# Patient Record
Sex: FEMALE | Race: ASIAN | NOT HISPANIC OR LATINO | Employment: OTHER | URBAN - METROPOLITAN AREA
[De-identification: names, ages, dates, MRNs, and addresses within clinical notes are randomized per-mention and may not be internally consistent; named-entity substitution may affect disease eponyms.]

---

## 2020-10-19 ENCOUNTER — TRANSFERRED RECORDS (OUTPATIENT)
Dept: HEALTH INFORMATION MANAGEMENT | Facility: CLINIC | Age: 70
End: 2020-10-19

## 2020-10-21 ENCOUNTER — TRANSFERRED RECORDS (OUTPATIENT)
Dept: HEALTH INFORMATION MANAGEMENT | Facility: CLINIC | Age: 70
End: 2020-10-21

## 2020-10-21 LAB
CHOLESTEROL (EXTERNAL): 205 MG/DL (ref 100–199)
CREATININE (EXTERNAL): 0.86 MG/DL (ref 0.57–1.11)
GFR ESTIMATED (EXTERNAL): >60 ML/MIN/1.73M2
GFR ESTIMATED (IF AFRICAN AMERICAN) (EXTERNAL): >60 ML/MIN/1.73M2
GLUCOSE (EXTERNAL): 101 MG/DL (ref 65–100)
HDLC SERPL-MCNC: 62 MG/DL
LDL CHOLESTEROL DIRECT (EXTERNAL): 97 MG/DL
NON HDL CHOLESTEROL (EXTERNAL): 143 MG/DL
POTASSIUM (EXTERNAL): 3.8 MMOL/L (ref 3.5–5)
TRIGLYCERIDES (EXTERNAL): 410 MG/DL

## 2021-03-22 ENCOUNTER — TRANSFERRED RECORDS (OUTPATIENT)
Dept: HEALTH INFORMATION MANAGEMENT | Facility: CLINIC | Age: 71
End: 2021-03-22

## 2021-05-21 ENCOUNTER — TRANSFERRED RECORDS (OUTPATIENT)
Dept: HEALTH INFORMATION MANAGEMENT | Facility: CLINIC | Age: 71
End: 2021-05-21

## 2021-08-12 ENCOUNTER — LAB REQUISITION (OUTPATIENT)
Dept: LAB | Facility: CLINIC | Age: 71
End: 2021-08-12
Payer: COMMERCIAL

## 2021-08-12 DIAGNOSIS — U07.1 COVID-19: ICD-10-CM

## 2021-08-25 ENCOUNTER — OFFICE VISIT (OUTPATIENT)
Dept: FAMILY MEDICINE | Facility: CLINIC | Age: 71
End: 2021-08-25
Payer: COMMERCIAL

## 2021-08-25 ENCOUNTER — ANCILLARY PROCEDURE (OUTPATIENT)
Dept: GENERAL RADIOLOGY | Facility: CLINIC | Age: 71
End: 2021-08-25
Attending: INTERNAL MEDICINE
Payer: COMMERCIAL

## 2021-08-25 VITALS
OXYGEN SATURATION: 97 % | HEART RATE: 69 BPM | SYSTOLIC BLOOD PRESSURE: 128 MMHG | BODY MASS INDEX: 21.17 KG/M2 | TEMPERATURE: 97.1 F | DIASTOLIC BLOOD PRESSURE: 81 MMHG | RESPIRATION RATE: 16 BRPM | HEIGHT: 64 IN | WEIGHT: 124 LBS

## 2021-08-25 DIAGNOSIS — R01.1 HEART MURMUR: ICD-10-CM

## 2021-08-25 DIAGNOSIS — M25.531 RIGHT WRIST PAIN: ICD-10-CM

## 2021-08-25 DIAGNOSIS — I10 BENIGN ESSENTIAL HYPERTENSION: ICD-10-CM

## 2021-08-25 DIAGNOSIS — G30.9 ALZHEIMER'S DEMENTIA WITHOUT BEHAVIORAL DISTURBANCE, UNSPECIFIED TIMING OF DEMENTIA ONSET: ICD-10-CM

## 2021-08-25 DIAGNOSIS — F02.80 ALZHEIMER'S DEMENTIA WITHOUT BEHAVIORAL DISTURBANCE, UNSPECIFIED TIMING OF DEMENTIA ONSET: ICD-10-CM

## 2021-08-25 DIAGNOSIS — E78.5 HYPERLIPIDEMIA LDL GOAL <100: ICD-10-CM

## 2021-08-25 DIAGNOSIS — T88.7XXA MEDICATION SIDE EFFECTS: Primary | ICD-10-CM

## 2021-08-25 LAB
ERYTHROCYTE [DISTWIDTH] IN BLOOD BY AUTOMATED COUNT: 13.7 % (ref 10–15)
HCT VFR BLD AUTO: 43.7 % (ref 35–47)
HGB BLD-MCNC: 14.7 G/DL (ref 11.7–15.7)
MCH RBC QN AUTO: 32.7 PG (ref 26.5–33)
MCHC RBC AUTO-ENTMCNC: 33.6 G/DL (ref 31.5–36.5)
MCV RBC AUTO: 97 FL (ref 78–100)
PLATELET # BLD AUTO: 199 10E3/UL (ref 150–450)
RBC # BLD AUTO: 4.5 10E6/UL (ref 3.8–5.2)
WBC # BLD AUTO: 5.9 10E3/UL (ref 4–11)

## 2021-08-25 PROCEDURE — 36415 COLL VENOUS BLD VENIPUNCTURE: CPT | Performed by: INTERNAL MEDICINE

## 2021-08-25 PROCEDURE — 82043 UR ALBUMIN QUANTITATIVE: CPT | Performed by: INTERNAL MEDICINE

## 2021-08-25 PROCEDURE — 80053 COMPREHEN METABOLIC PANEL: CPT | Performed by: INTERNAL MEDICINE

## 2021-08-25 PROCEDURE — 93000 ELECTROCARDIOGRAM COMPLETE: CPT | Performed by: INTERNAL MEDICINE

## 2021-08-25 PROCEDURE — 99204 OFFICE O/P NEW MOD 45 MIN: CPT | Performed by: INTERNAL MEDICINE

## 2021-08-25 PROCEDURE — 85027 COMPLETE CBC AUTOMATED: CPT | Performed by: INTERNAL MEDICINE

## 2021-08-25 PROCEDURE — 73110 X-RAY EXAM OF WRIST: CPT | Mod: RT | Performed by: RADIOLOGY

## 2021-08-25 PROCEDURE — 80061 LIPID PANEL: CPT | Performed by: INTERNAL MEDICINE

## 2021-08-25 PROCEDURE — 84443 ASSAY THYROID STIM HORMONE: CPT | Performed by: INTERNAL MEDICINE

## 2021-08-25 PROCEDURE — 83721 ASSAY OF BLOOD LIPOPROTEIN: CPT | Mod: 59 | Performed by: INTERNAL MEDICINE

## 2021-08-25 RX ORDER — DONEPEZIL HYDROCHLORIDE 10 MG/1
10 TABLET, FILM COATED ORAL DAILY
COMMUNITY
Start: 2020-09-30 | End: 2022-01-24

## 2021-08-25 RX ORDER — AMLODIPINE BESYLATE 2.5 MG/1
2.5 TABLET ORAL DAILY
COMMUNITY
Start: 2021-08-04 | End: 2022-01-24

## 2021-08-25 RX ORDER — ATORVASTATIN CALCIUM 20 MG/1
20 TABLET, FILM COATED ORAL DAILY
COMMUNITY
Start: 2020-09-30 | End: 2021-10-19

## 2021-08-25 RX ORDER — FLUOXETINE 10 MG/1
1 CAPSULE ORAL DAILY
COMMUNITY
Start: 2021-05-04 | End: 2021-11-22

## 2021-08-25 RX ORDER — MEMANTINE HYDROCHLORIDE 10 MG/1
10 TABLET ORAL 2 TIMES DAILY
COMMUNITY
Start: 2020-09-30 | End: 2021-11-02

## 2021-08-25 ASSESSMENT — PATIENT HEALTH QUESTIONNAIRE - PHQ9: SUM OF ALL RESPONSES TO PHQ QUESTIONS 1-9: 0

## 2021-08-25 ASSESSMENT — MIFFLIN-ST. JEOR: SCORE: 1072.46

## 2021-08-25 NOTE — PROGRESS NOTES
Assessment & Plan   Problem List Items Addressed This Visit     None      Visit Diagnoses     Medication side effects    -  Primary    Relevant Orders    EKG 12-lead complete w/read - Clinics (Completed)    Right wrist pain        Relevant Orders    Orthopedic  Referral    XR Wrist Right G/E 3 Views (Completed)    Benign essential hypertension        Relevant Orders    Comprehensive metabolic panel (BMP + Alb, Alk Phos, ALT, AST, Total. Bili, TP) (Completed)    Albumin Random Urine Quantitative with Creat Ratio (Completed)    CBC with platelets (Completed)    EKG 12-lead complete w/read - Clinics (Completed)    Hyperlipidemia LDL goal <100        Relevant Medications    atorvastatin (LIPITOR) 20 MG tablet    Other Relevant Orders    Lipid panel reflex to direct LDL Fasting (Completed)    LDL cholesterol direct (Completed)    Alzheimer's dementia without behavioral disturbance, unspecified timing of dementia onset (H)        Relevant Medications    donepezil (ARICEPT) 10 MG tablet    FLUoxetine (PROZAC) 10 MG capsule    memantine (NAMENDA) 10 MG tablet    Other Relevant Orders    TSH with free T4 reflex (Completed)    Memory Clinic Referral    Heart murmur        Relevant Orders    Echocardiogram Complete        Discussed multiple medical problems.  We will do an echo due to murmur.  Referral to memory clinic.  No change of medications for now.  Denies any side effects from medications or dizziness.  No drowsiness or lethargy.  We will check an EKG make sure QT is not prolonged.All medications will need to be monitored intensely and closely for any side effects or toxicity including Aricept and namenda due to underlying cognitive issues..     She has right wrist pain advised to use a wrist splint ,will check x-ray of the right wrist and refer to sports medicine, she might have tenosynovitis and may benefit from steroid injection.  No history for trauma to the wrist.           CONSULTATION/REFERRAL to  "memory clinic  See Patient Instructions    Return in about 3 months (around 11/25/2021), or if symptoms worsen or fail to improve, for As needed and if symptoms worsen, Physical Exam.    Prema Ramachandran MD  [facility] CASSY Salazar is a 70 year old who presents for the following health issues  accompanied by her son Preston :    HPI     Moved to [place] , was doctoring at [place],    Right wrist pain, no trauma, no swelling    Right eye cataract, doing surgery for left eye    Will need to schedule preop visit.    Memory concerns over past 6 yrs, sees Preston Wiggins at [place] [facility] in [place],     Moved from there    Depressed mood in winter, on prozac helps,       Had bone densityometry last yr, was good as per son who is a nurse,     Last mammogram 5 yrs ago      Drink alcohol 1-2 x a week, 2 drinks at a time    No smoking history     family difficult to say about family history as per son from [facility], ?[place] , had dementia,               Review of Systems   Constitutional, HEENT, cardiovascular, pulmonary, gi and gu systems are negative, except as otherwise noted.      Objective    /81 (BP Location: Left arm, Patient Position: Chair, Cuff Size: Adult Regular)   Pulse 69   Temp 97.1  F (36.2  C) (Temporal)   Resp 16   Ht 1.626 m (5' 4\")   Wt 56.2 kg (124 lb)   SpO2 97%   BMI 21.28 kg/m    Body mass index is 21.28 kg/m .  Physical Exam   GENERAL: healthy, alert and no distress  EYES: Eyes grossly normal to inspection, PERRL and conjunctivae and sclerae normal  HENT: ear canals and TM's normal, nose and mouth without ulcers or lesions  NECK: no adenopathy, no asymmetry, masses, or scars and thyroid normal to palpation  RESP: lungs clear to auscultation - no rales, rhonchi or wheezes  CV: regular rate and rhythm, normal S1 S2, no S3 or S4, grade 2 systolic murmur left lower sternal border, no click or rub, no peripheral edema and peripheral pulses strong  ABDOMEN: " soft, nontender, no hepatosplenomegaly, no masses and bowel sounds normal  MS: no gross musculoskeletal defects noted, no edema.  Has point tenderness to the base of the right thumb.  Positive Finkelstein test.  No swelling of the right wrist.  Some limitation range of motion due to pain  SKIN: no suspicious lesions or rashes  NEURO: Normal strength and tone, mentation slow and speech normal  PSYCH: mentation appears slow , affect normal/bright    No results found for any previous visit.

## 2021-08-25 NOTE — Clinical Note
Please abstract the following data from this visit with this patient into the appropriate field in Epic:    Tests that can be patient reported without a hard copy:        Other Tests found in the patient's chart through Chart Review/Care Everywhere:    Colonoscopy done by this group [facility] on this date: 10/12/2011 and Mammogram done by this group [facility] this date: 10/19/2020

## 2021-08-25 NOTE — LETTER
August 27, 2021      Marie Connolly  [address]  [place] [facility] [address]        Dear MsJesúsMohsen,    We are writing to inform you of your test results.    Marie, reviewed your EKG looks normal.  CBC shows normal white count, normal red blood cell count, normal hemoglobin hematocrit; no anemia and normal platelet count    If you have any questions or concerns, please call the clinic at the number listed above.       Sincerely,      Prema Ramachandran MD

## 2021-08-25 NOTE — LETTER
August 27, 2021      Marie Connolly  [address]  [facility] [address]        Dear ,    We are writing to inform you of your test results.    Marie, please be reassured with the x-ray of the wrist there is no fracture, there is normal joint alignment.  There is mention of mild arthritis at the base of the right thumb.  Please follow-up recommendations given in the clinic  Dr. Ramachandran       Resulted Orders   XR Wrist Right G/E 3 Views    Narrative    Examination:  XR WRIST RIGHT G/E 3 VIEWS    Date:  8/25/2021 2:46 PM     Clinical Information: Right wrist pain    Comparison: none.      Impression    Impression:    1.  Right wrist negative for fracture or erosion. Normal joint  alignment. Maintained joint spacing. Mild degenerative arthritic  changes at the base of the thumb and the first MCP, first CMC, and STT  joints. Mild soft tissue swelling in the radial aspect of the wrist.    HAIM HERRERA MD         SYSTEM ID:  [id number]       If you have any questions or concerns, please call the clinic at the number listed above.       Sincerely,      Prema Ramachandran MD

## 2021-08-25 NOTE — LETTER
August 30, 2021      Marie Connolly  [address]  [facility] [address]        Dear ,    We are writing to inform you of your test results.    Joselitoheather, your labs,  Lipid panel shows LDL at 114, goal is less than 100, very elevated triglyceride at 671, goal less than 150, HDL which is a good cholesterol is at goal at 61, HDL is the good cholesterol,  LDL is the bad cholesterol.  I do recommend you start on medication for triglyceride called TriCor 154 mg once daily that helps lower the triglyceride to normal.  Please continue on atorvastatin current dose you are taking   Please abstain from any alcohol intake as that can increase your triglyceride; very elevated triglyceride can cause pancreatitis.  We will need to closely monitor for any signs or symptoms of abdominal pain or vomiting.   Chemistry shows normal electrolytes, normal calcium level, kidney function test appears to be normal GFR 66, glucose is normal, calcium level is normal.   GFR is glomerular filtration rate.  Normal GFR is greater than 60.   Urine microalbumin is negative which means there is no protein in the urine which is reassuring.   We will repeat your lipid panel in 2 months fasting.   Any further questions please let me know   Please call the lab in 2 months to reschedule your labs for lipid panel fasting   We will send a prescription of TriCor/fenofibrate to the pharmacy on record.   Any further questions please let me know   Dr. Ramachandran      Resulted Orders   Lipid panel reflex to direct LDL Fasting   Result Value Ref Range    Cholesterol 240 (H) <200 mg/dL      Comment:      Age 0-19 years  Desirable: <170 mg/dL  Borderline high:  170-199 mg/dl  High:            >199 mg/dl    Age 20 years and older  Desirable: <200 mg/dL    Triglycerides 671 (H) <150 mg/dL      Comment:      0-9 years:  Normal:    Less than 75 mg/dL  Borderline high:  75-99 mg/dL  High:             Greater than or equal to 100 mg/dL    0-19 years:  Normal:     Less than 90 mg/dL  Borderline high:   mg/dL  High:             Greater than or equal to 130 mg/dL    20 years and older:  Normal:    Less than 150 mg/dL  Borderline high:  150-199 mg/dL  High:             200-499 mg/dL  Very high:   Greater than or equal to 500 mg/dL    Direct Measure HDL 61 >=50 mg/dL      Comment:      0-19 years:       Greater than or equal to 45 mg/dL   Low: Less than 40 mg/dL   Borderline low: 40-44 mg/dL     20 years and older:   Female: Greater than or equal to 50 mg/dL   Male:   Greater than or equal to 40 mg/dL         LDL Cholesterol Calculated        Comment:      Cannot estimate LDL when triglyceride exceeds 400 mg/dL  Age 0-19 years:  Desirable: 0-110 mg/dL   Borderline high: 110-129 mg/dL   High: >= 130 mg/dL    Age 20 years and older:  Desirable: <100mg/dL  Above desirable: 100-129 mg/dL   Borderline high: 130-159 mg/dL   High: 160-189 mg/dL   Very high: >= 190 mg/dL    Non HDL Cholesterol 179 (H) <130 mg/dL      Comment:      0-19 years:  Desirable:          Less than 120 mg/dL  Borderline high:   120-144 mg/dL  High:                   Greater than or equal to 145 mg/dL    20 years and older:  Desirable:          130 mg/dL  Above Desirable: 130-159 mg/dL  Borderline high:   160-189 mg/dL  High:               190-219 mg/dL  Very high:     Greater than or equal to 220 mg/dL    Patient Fasting > 8hrs? No    Comprehensive metabolic panel (BMP + Alb, Alk Phos, ALT, AST, Total. Bili, TP)   Result Value Ref Range    Sodium 140 133 - 144 mmol/L    Potassium 4.2 3.4 - 5.3 mmol/L    Chloride 106 94 - 109 mmol/L    Carbon Dioxide (CO2) 29 20 - 32 mmol/L    Anion Gap 5 3 - 14 mmol/L    Urea Nitrogen 9 7 - 30 mg/dL    Creatinine 0.89 0.52 - 1.04 mg/dL    Calcium 9.2 8.5 - 10.1 mg/dL    Glucose 101 (H) 70 - 99 mg/dL    Alkaline Phosphatase 55 40 - 150 U/L    AST 34 0 - 45 U/L    ALT 40 0 - 50 U/L    Protein Total 7.3 6.8 - 8.8 g/dL    Albumin 3.8 3.4 - 5.0 g/dL    Bilirubin Total 0.5 0.2 -  1.3 mg/dL    GFR Estimate 66 >60 mL/min/1.73m2      Comment:      As of July 11, 2021, eGFR is calculated by the CKD-EPI creatinine equation, without race adjustment. eGFR can be influenced by muscle mass, exercise, and diet. The reported eGFR is an estimation only and is only applicable if the renal function is stable.   Albumin Random Urine Quantitative with Creat Ratio   Result Value Ref Range    Creatinine Urine mg/dL 190 mg/dL    Albumin Urine mg/L 13 mg/L    Albumin Urine mg/g Cr 6.84 0.00 - 25.00 mg/g Cr   CBC with platelets   Result Value Ref Range    WBC Count 5.9 4.0 - 11.0 10e3/uL    RBC Count 4.50 3.80 - 5.20 10e6/uL    Hemoglobin 14.7 11.7 - 15.7 g/dL    Hematocrit 43.7 35.0 - 47.0 %    MCV 97 78 - 100 fL    MCH 32.7 26.5 - 33.0 pg    MCHC 33.6 31.5 - 36.5 g/dL    RDW 13.7 10.0 - 15.0 %    Platelet Count 199 150 - 450 10e3/uL   TSH with free T4 reflex   Result Value Ref Range    TSH 1.08 0.40 - 4.00 mU/L   LDL cholesterol direct   Result Value Ref Range    LDL Cholesterol Direct 114 (H) <100 mg/dL      Comment:      Age 0-19 years:  Desirable: 0-110 mg/dL   Borderline high: 110-129 mg/dL   High: >= 130 mg/dL    Age 20 years and older:  Desirable: <100mg/dL  Above desirable: 100-129 mg/dL   Borderline high: 130-159 mg/dL   High: 160-189 mg/dL   Very high: >= 190 mg/dL       If you have any questions or concerns, please call the clinic at the number listed above.       Sincerely,      Prema Ramachandran MD

## 2021-08-27 ENCOUNTER — TELEPHONE (OUTPATIENT)
Dept: FAMILY MEDICINE | Facility: CLINIC | Age: 71
End: 2021-08-27

## 2021-08-27 LAB
ALBUMIN SERPL-MCNC: 3.8 G/DL (ref 3.4–5)
ALP SERPL-CCNC: 55 U/L (ref 40–150)
ALT SERPL W P-5'-P-CCNC: 40 U/L (ref 0–50)
ANION GAP SERPL CALCULATED.3IONS-SCNC: 5 MMOL/L (ref 3–14)
AST SERPL W P-5'-P-CCNC: 34 U/L (ref 0–45)
BILIRUB SERPL-MCNC: 0.5 MG/DL (ref 0.2–1.3)
BUN SERPL-MCNC: 9 MG/DL (ref 7–30)
CALCIUM SERPL-MCNC: 9.2 MG/DL (ref 8.5–10.1)
CHLORIDE BLD-SCNC: 106 MMOL/L (ref 94–109)
CHOLEST SERPL-MCNC: 240 MG/DL
CO2 SERPL-SCNC: 29 MMOL/L (ref 20–32)
CREAT SERPL-MCNC: 0.89 MG/DL (ref 0.52–1.04)
CREAT UR-MCNC: 190 MG/DL
FASTING STATUS PATIENT QL REPORTED: NO
GFR SERPL CREATININE-BSD FRML MDRD: 66 ML/MIN/1.73M2
GLUCOSE BLD-MCNC: 101 MG/DL (ref 70–99)
HDLC SERPL-MCNC: 61 MG/DL
LDLC SERPL CALC-MCNC: 114 MG/DL
LDLC SERPL CALC-MCNC: ABNORMAL MG/DL
MICROALBUMIN UR-MCNC: 13 MG/L
MICROALBUMIN/CREAT UR: 6.84 MG/G CR (ref 0–25)
NONHDLC SERPL-MCNC: 179 MG/DL
POTASSIUM BLD-SCNC: 4.2 MMOL/L (ref 3.4–5.3)
PROT SERPL-MCNC: 7.3 G/DL (ref 6.8–8.8)
SODIUM SERPL-SCNC: 140 MMOL/L (ref 133–144)
TRIGL SERPL-MCNC: 671 MG/DL
TSH SERPL DL<=0.005 MIU/L-ACNC: 1.08 MU/L (ref 0.4–4)

## 2021-08-27 NOTE — RESULT ENCOUNTER NOTE
Marie, please be reassured with the x-ray of the wrist there is no fracture, there is normal joint alignment.  There is mention of mild arthritis at the base of the right thumb.  Please follow-up recommendations given in the clinicDrJesús Ramachandran

## 2021-08-28 DIAGNOSIS — E78.2 MIXED HYPERLIPIDEMIA: Primary | ICD-10-CM

## 2021-08-28 RX ORDER — FENOFIBRATE 145 MG/1
145 TABLET, COATED ORAL DAILY
Qty: 90 TABLET | Refills: 1 | Status: SHIPPED | OUTPATIENT
Start: 2021-08-28 | End: 2022-01-24

## 2021-08-30 ENCOUNTER — TELEPHONE (OUTPATIENT)
Dept: FAMILY MEDICINE | Facility: CLINIC | Age: 71
End: 2021-08-30

## 2021-08-30 NOTE — TELEPHONE ENCOUNTER
Patient Contact    Attempt # 1    Was call answered?  No.  Unable to leave message.    Mailed result letter    Radha CHOWDARY RN

## 2021-08-30 NOTE — TELEPHONE ENCOUNTER
----- Message from Prema Ramachandran MD sent at 8/28/2021 11:49 AM CDT -----  Prina, reviewed rest of your labs,Lipid panel shows LDL at 114, goal is less than 100, very elevated triglyceride at 671, goal less than 150, HDL which is a good cholesterol is at goal at 61, HDL is the good cholesterol,LDL is the bad cholesterol.I do recommend you start on medication for triglyceride called TriCor 154 mg once daily that helps lower the triglyceride to normal.  Please continue on atorvastatin current dose you are taking  Please abstain from any alcohol intake as that can increase your triglyceride; very elevated triglyceride can cause pancreatitis.  We will need to closely monitor for any signs or symptoms of abdominal pain or vomiting.  Chemistry shows normal electrolytes, normal calcium level, kidney function test appears to be normal GFR 66, glucose is normal, calcium level is normal.  GFR is glomerular filtration rate.  Normal GFR is greater than 60.  Urine microalbumin is negative which means there is no protein in the urine which is reassuring.  We will repeat your lipid panel in 2 months fasting.  Any further questions please let me know  Please call the lab in 2 months to reschedule your labs for lipid panel fasting  We will send a prescription of TriCor/fenofibrate to the pharmacy on record.  Any further questions please let me know  Dr. Ramachandran

## 2021-08-30 NOTE — TELEPHONE ENCOUNTER
Unable to leave message, Radha EUBANKS mailed results.     Missy MartinezRN  [facility]

## 2021-09-02 NOTE — PROGRESS NOTES
CHIEF COMPLAINT:  No chief complaint on file.       HISTORY OF PRESENT ILLNESS  Ms. Connolly is a pleasant 70 year old year old female who presents to clinic today with right wrist pain.  Marie explains that she fell. She is here with her daughter, who is helping provide history. Daughter states she has Alzheimer's. Daughter states she has been holding and favoring her wrist, notes every time she saw her patient would state her wrist hurts.     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 No    Do you have any vision problems? No    Do you have any chest pain or edema? No    Do you have any shortness of breath or wheezing?  No    Do you have stomach problems? No    Do you have any numbness or focal weakness? Yes, in right wrist    Do you have diabetes? No    Do you have problems with bleeding or clotting? No    Do you have an rashes or other skin lesions? No    MEDICAL HISTORY  There is no problem list on file for this patient.      Current Outpatient Medications   Medication Sig Dispense Refill     amLODIPine (NORVASC) 2.5 MG tablet Take 2.5 mg by mouth daily       atorvastatin (LIPITOR) 20 MG tablet Take 20 mg by mouth daily       donepezil (ARICEPT) 10 MG tablet Take 10 mg by mouth daily       fenofibrate (TRICOR) 145 MG tablet Take 1 tablet (145 mg) by mouth daily 90 tablet 1     FLUoxetine (PROZAC) 10 MG capsule Take 1 capsule by mouth daily       memantine (NAMENDA)  10 MG tablet Take 10 mg by mouth 2 times daily         No Known Allergies    No family history on file.    Additional medical/Social/Surgical histories reviewed in [facility] and updated as appropriate.       PHYSICAL EXAM  There were no vitals taken for this visit.    General  - normal appearance, in no obvious distress  HEENT  - conjunctivae not injected, moist mucous membranes  CV  - normal radial pulse  Pulm  - normal respiratory pattern, non-labored  Musculoskeletal -R wrist  - inspection: normal joint alignment, no obvious deformity, trace soft tissue swelling over dorsal 1st compartment  - palpation: mild tenderness over APL and EPB  - ROM:  90 deg flexion   70 deg extension   25 deg abduction   65 deg adduction  - strength: 5/5  strength, 5/5 flexion, extension, pronation, supination, adduction, abduction  - special tests:  (-) Tinel's  (+) Finkelstein    Neuro  - no sensory or motor deficit, grossly normal coordination, normal muscle tone  Skin  - no ecchymosis, erythema, warmth, or induration, no obvious rash  Psych  - interactive, appropriate, normal mood and affect    IMAGING :    Examination:  XR WRIST RIGHT G/E 3 VIEWS     Date:  8/25/2021 2:46 PM      Clinical Information: Right wrist pain     Comparison: none.                                                                      Impression:     1.  Right wrist negative for fracture or erosion. Normal joint  alignment. Maintained joint spacing. Mild degenerative arthritic  changes at the base of the thumb and the first MCP, first CMC, and STT  joints. Mild soft tissue swelling in the radial aspect of the wrist.     HAIM HERRERA MD     ASSESSMENT & PLAN  Ms. Connolly is a 70 year old year old female who presents to clinic today with ongoing right wrist pain.    Diagnosis: Dequervain's tenosynovitis of right wrist    -Diclofenac Gel BID  -Thumb spica brace dispensed  -OT referral for strengthening, ROM, ultrasound/appropriate modalities  -Consider  corticosteroid injection if no improvement 6 weeks  -Follow up 6 weeks PRN    It was a pleasure seeing Marie today.    Iker Nath DO, CAQSM  Primary Care Sports Medicine

## 2021-09-15 ENCOUNTER — OFFICE VISIT (OUTPATIENT)
Dept: ORTHOPEDICS | Facility: CLINIC | Age: 71
End: 2021-09-15
Attending: INTERNAL MEDICINE
Payer: COMMERCIAL

## 2021-09-15 VITALS
BODY MASS INDEX: 22.28 KG/M2 | WEIGHT: 130.5 LBS | HEIGHT: 64 IN | SYSTOLIC BLOOD PRESSURE: 100 MMHG | DIASTOLIC BLOOD PRESSURE: 68 MMHG

## 2021-09-15 DIAGNOSIS — M25.531 RIGHT WRIST PAIN: ICD-10-CM

## 2021-09-15 DIAGNOSIS — M65.4 DE QUERVAIN'S TENOSYNOVITIS, RIGHT: Primary | ICD-10-CM

## 2021-09-15 PROCEDURE — 99203 OFFICE O/P NEW LOW 30 MIN: CPT | Performed by: FAMILY MEDICINE

## 2021-09-15 ASSESSMENT — MIFFLIN-ST. JEOR: SCORE: 1101.94

## 2021-09-15 NOTE — PATIENT INSTRUCTIONS
Thank you for choosing [facility]    [facility] CLINIC LOCATIONS  [place] [address] [address], [address]th [place]   [place], [address] [place] [address]   [phone]       APPOINTMENTS: [phone]    CARE QUESTIONS: [phone], #3    BILLING QUESTIONS: [phone]    FAX NUMBER: [phone]        Follow up: 6 weeks as needed- possible injection      1. Right wrist pain        Physical Therapy orders have been placed with [facility] (formally [facility])  You can call [phone] to schedule at your convenience.

## 2021-09-15 NOTE — LETTER
9/15/2021         RE: Marie Connolly  [address]  [address]  [place] [address]        Dear Colleague,    Thank you for referring your patient, Marie Connolly, to the [facility] [place]. Please see a copy of my visit note below.    CHIEF COMPLAINT:  No chief complaint on file.       HISTORY OF PRESENT ILLNESS  Ms. Connolly is a pleasant 70 year old year old female who presents to clinic today with right wrist pain.  Marie explains that she fell. She is here with her daughter, who is helping provide history. Daughter states she has Alzheimer's. Daughter states she has been holding and favoring her wrist, notes every time she saw her patient would state her wrist hurts.     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 No    Do you have any vision problems? No    Do you have any chest pain or edema? No    Do you have any shortness of breath or wheezing?  No    Do you have stomach problems? No    Do you have any numbness or focal weakness? Yes, in right wrist    Do you have diabetes? No    Do you have problems with bleeding or clotting? No    Do you have an rashes or other skin lesions? No    MEDICAL HISTORY  There is no problem list on file for this patient.      Current Outpatient Medications   Medication Sig Dispense Refill     amLODIPine (NORVASC) 2.5 MG tablet Take 2.5 mg by mouth daily       atorvastatin (LIPITOR) 20 MG tablet Take 20 mg  by mouth daily       donepezil (ARICEPT) 10 MG tablet Take 10 mg by mouth daily       fenofibrate (TRICOR) 145 MG tablet Take 1 tablet (145 mg) by mouth daily 90 tablet 1     FLUoxetine (PROZAC) 10 MG capsule Take 1 capsule by mouth daily       memantine (NAMENDA) 10 MG tablet Take 10 mg by mouth 2 times daily         No Known Allergies    No family history on file.    Additional medical/Social/Surgical histories reviewed in [facility] and updated as appropriate.       PHYSICAL EXAM  There were no vitals taken for this visit.    General  - normal appearance, in no obvious distress  HEENT  - conjunctivae not injected, moist mucous membranes  CV  - normal radial pulse  Pulm  - normal respiratory pattern, non-labored  Musculoskeletal -R wrist  - inspection: normal joint alignment, no obvious deformity, trace soft tissue swelling over dorsal 1st compartment  - palpation: mild tenderness over APL and EPB  - ROM:  90 deg flexion   70 deg extension   25 deg abduction   65 deg adduction  - strength: 5/5  strength, 5/5 flexion, extension, pronation, supination, adduction, abduction  - special tests:  (-) Tinel's  (+) Finkelstein    Neuro  - no sensory or motor deficit, grossly normal coordination, normal muscle tone  Skin  - no ecchymosis, erythema, warmth, or induration, no obvious rash  Psych  - interactive, appropriate, normal mood and affect    IMAGING :    Examination:  XR WRIST RIGHT G/E 3 VIEWS     Date:  8/25/2021 2:46 PM      Clinical Information: Right wrist pain     Comparison: none.                                                                      Impression:     1.  Right wrist negative for fracture or erosion. Normal joint  alignment. Maintained joint spacing. Mild degenerative arthritic  changes at the base of the thumb and the first MCP, first CMC, and STT  joints. Mild soft tissue swelling in the radial aspect of the wrist.     HAIM HERRERA MD     ASSESSMENT & PLAN  Ms. Connolly is a 70 year old year  old female who presents to clinic today with ongoing right wrist pain.    Diagnosis: Dequervain's tenosynovitis of right wrist    -Diclofenac Gel BID  -Thumb spica brace dispensed  -OT referral for strengthening, ROM, ultrasound/appropriate modalities  -Consider corticosteroid injection if no improvement 6 weeks  -Follow up 6 weeks PRN    It was a pleasure seeing Marie today.    Iker Nath DO, [facility]  Primary Care Sports Medicine        Again, thank you for allowing me to participate in the care of your patient.        Sincerely,        Iker Nath DO

## 2021-09-17 ENCOUNTER — HOSPITAL ENCOUNTER (OUTPATIENT)
Dept: CARDIOLOGY | Facility: CLINIC | Age: 71
Discharge: HOME | End: 2021-09-17
Attending: INTERNAL MEDICINE | Admitting: INTERNAL MEDICINE
Payer: COMMERCIAL

## 2021-09-17 DIAGNOSIS — R01.1 HEART MURMUR: ICD-10-CM

## 2021-09-17 LAB — LVEF ECHO: NORMAL

## 2021-09-17 PROCEDURE — 93306 TTE W/DOPPLER COMPLETE: CPT

## 2021-09-17 PROCEDURE — 93306 TTE W/DOPPLER COMPLETE: CPT | Mod: 26 | Performed by: INTERNAL MEDICINE

## 2021-09-21 NOTE — RESULT ENCOUNTER NOTE
"Marie, Reviewed your echocardiogram, that shows normal function of left ventricular; ejection fraction of 60 to 65% [which is normal] without any regional wall motion abnormality which is reassuring; normal right side of the ventricle size and function, there is mention of moderate aortic sclerosis  without any significant narrowing.  There is mention of \"mild: aortic valve regurgitation which we will continue to monitor the aortic valve gradient and the regurgitation.  We will repeat an echo in the next 2 years.  Aortic valve sclerosis is defined as calcification and thickening of a trileaflet aortic valve in the absence of obstruction of ventricular outflow.   Aortic valve regurgitation - or aortic regurgitation - is a condition that occurs when your heart's aortic valve doesn't close tightly.  Any further questions please let me know  Dr. Ramachandran"

## 2021-09-23 ENCOUNTER — TELEPHONE (OUTPATIENT)
Dept: FAMILY MEDICINE | Facility: CLINIC | Age: 71
End: 2021-09-23

## 2021-09-23 NOTE — TELEPHONE ENCOUNTER
Called number listed, number is patient son Preston's number, no C2C.  Asked for patient to callback, or call back when patient is with Preston.   Advised to fill out consent to communicate form when patient next  in the clinic.    Kodi Toure RN  [facility]

## 2021-09-23 NOTE — TELEPHONE ENCOUNTER
"----- Message from Prema Ramachandran MD sent at 9/21/2021  5:45 PM CDT -----  Marie, Reviewed your echocardiogram, that shows normal function of left ventricular; ejection fraction of 60 to 65% [which is normal] without any regional wall motion abnormality which is reassuring; normal right side of the ventricle size and function, there is mention of moderate aortic sclerosis  without any significant narrowing.  There is mention of \"mild: aortic valve regurgitation which we will continue to monitor the aortic valve gradient and the regurgitation.  We will repeat an echo in the next 2 years.  Aortic valve sclerosis is defined as calcification and thickening of a trileaflet aortic valve in the absence of obstruction of ventricular outflow.   Aortic valve regurgitation - or aortic regurgitation - is a condition that occurs when your heart's aortic valve doesn't close tightly.  Any further questions please let me know  Dr. Ramachandran  "

## 2021-09-23 NOTE — LETTER
"September 28, 2021      Marie Connolly  [address]  [address]  [place] [address]              Dear Marie Salazar, Reviewed your echocardiogram, that shows normal function of left ventricular; ejection fraction of 60 to 65% [which is normal] without any regional wall motion abnormality which is reassuring; normal right side of the ventricle size and function, there is mention of moderate aortic sclerosis  without any significant narrowing.  There is mention of \"mild: aortic valve regurgitation which we will continue to monitor the aortic valve gradient and the regurgitation.  We will repeat an echo in the next 2 years.  Aortic valve sclerosis is defined as calcification and thickening of a trileaflet aortic valve in the absence of obstruction of ventricular outflow.   Aortic valve regurgitation - or aortic regurgitation - is a condition that occurs when your heart's aortic valve doesn't close tightly.  Any further questions please let me know  Dr. Ramachandran      Sincerely,      Prema Ramachandran MD    "

## 2021-09-28 NOTE — TELEPHONE ENCOUNTER
Pt has not called back.   Mailed letter with results/result notes to patient     Radha CHOWDARY RN

## 2021-10-19 DIAGNOSIS — E78.2 MIXED HYPERLIPIDEMIA: Primary | ICD-10-CM

## 2021-10-19 RX ORDER — ATORVASTATIN CALCIUM 20 MG/1
20 TABLET, FILM COATED ORAL DAILY
Qty: 30 TABLET | Refills: 1 | Status: SHIPPED | OUTPATIENT
Start: 2021-10-19 | End: 2021-11-11

## 2021-10-19 NOTE — TELEPHONE ENCOUNTER
Historical in chart    LOV 8- johan Ramachandran care appt; follow up due November.  Patient to repeat fasting labs end Oct/begin Nov - advised for fasting OV.    #30 R1 pended   SIG/pharm note given - follow up due (fasting)    Pending Prescriptions:                       Disp   Refills    atorvastatin (LIPITOR) 20 MG tablet       30 tab*1            Sig: Take 1 tablet (20 mg) by mouth daily - fasting           office visit due November 2021          Last Written Prescription Date:  historical  Last Fill Quantity: -,   # refills: -  Last Office Visit: 8- [place]  Future Office visit:   none    Routing refill request to provider for review/approval because:  Medication is reported/historical  OV due, fasting labs due    RT Lary (R)

## 2021-11-01 ENCOUNTER — THERAPY VISIT (OUTPATIENT)
Dept: OCCUPATIONAL THERAPY | Facility: CLINIC | Age: 71
End: 2021-11-01
Attending: FAMILY MEDICINE
Payer: COMMERCIAL

## 2021-11-01 DIAGNOSIS — M25.531 RIGHT WRIST PAIN: ICD-10-CM

## 2021-11-01 PROCEDURE — 97140 MANUAL THERAPY 1/> REGIONS: CPT | Mod: GO | Performed by: OCCUPATIONAL THERAPIST

## 2021-11-01 PROCEDURE — 97110 THERAPEUTIC EXERCISES: CPT | Mod: GO | Performed by: OCCUPATIONAL THERAPIST

## 2021-11-01 PROCEDURE — 97035 APP MDLTY 1+ULTRASOUND EA 15: CPT | Mod: GO | Performed by: OCCUPATIONAL THERAPIST

## 2021-11-01 PROCEDURE — 97165 OT EVAL LOW COMPLEX 30 MIN: CPT | Mod: GO | Performed by: OCCUPATIONAL THERAPIST

## 2021-11-01 NOTE — PROGRESS NOTES
[facility]    OUTPATIENT Occupational Therapy ORTHOPEDIC EVALUATION  PLAN OF TREATMENT FOR OUTPATIENT REHABILITATION  (COMPLETE FOR INITIAL CLAIMS ONLY)  Patient's Last Name, First Name, M.I.  YOB: 1950  Marie Connolly       Provider s Name:  JOANN [facility]   Medical Record No.  [id number]   Start of Care Date:  11/01/21   Onset Date:   11/01/21   Type:     ___PT   x___OT Medical Diagnosis:    Encounter Diagnosis   Name Primary?     Right wrist pain         Treatment Diagnosis:  Right wrist pain        Goals:     11/01/21 0500   Goal #1   Current Functional Task    Current Performance Level mod diff   STG Target Perfomance Plate   STG Target Perform Level mild diff   Due Date 11/22/21   LTG Target Task/Performance No Difficulty   Due Date 01/29/22       Therapy Frequency:  1x/week  Predicted Duration of Therapy Intervention:  2 months    Jamilah Abraham OT                 I CERTIFY THE NEED FOR THESE SERVICES FURNISHED UNDER        THIS PLAN OF TREATMENT AND WHILE UNDER MY CARE     (Physician attestation of this document indicates review and certification of the therapy plan).                       Certification Date From:  11/01/21   Certification Date To:  01/29/22    Referring Provider:  Iker Nath    Initial Assessment        See Epic Evaluation SOC Date: 11/01/21

## 2021-11-15 NOTE — PROGRESS NOTES
Hand Therapy Initial Evaluation    Current Date:  11/1/2021    Diagnosis: Right wrist pain  DOI: 11/1/2021 (MD order)      Subjective:  Marie Connolly is a 71 year old female.    Patient reports symptoms of the right wrist which occurred due to overuse. Since onset symptoms are Gradually getting worse.  General health as reported by patient is good.  Pertinent medical history includes:____  Medical allergies____  Surgical history: ____ Medication history: ___    Current occupation is retired  Job Tasks: _____    Occupational Profile Information:  Right hand dominant  Prior functional level:  no limitations  Patient reports symptoms of pain and weakness/loss of strength  Special tests:  ____.    Previous treatment: none  Barriers include:none  Mobility: No difficulty  Transportation: drives  Currently working in normal job without restrictions  Leisure activities/hobbies:       Functional Outcome Measure:   Upper Extremity Functional Index Score:   70/80  (A lower score indicates greater disability.)      Objective:  Pain Level (Scale 0-10)   11/15/2021   At Rest 3   With Use 3     Pain Description  Date 11/15/2021   Location wrist and thumb   Pain Quality Aching   Frequency intermittent     Pain is worst  daytime   Exacerbated by  use   Relieved by none   Progression worsening     Sensation   WNL throughout all nerve distributions; per patient report       ROM  Pain Report: - none  + mild    ++ moderate    +++ severe   Wrist 11/1/2021 11/1/2021   AROM (PROM)     Extension     Flexion     RD     UD     Supination     Pronation       ROM  Thumb 11/1/2021 11/1/2021   AROM  (PROM)     MP / /   IP / /   RABD     PABD     Retropulsion     Kapandji Opposition Scale (0-10/10)       Tenderness 11/1/2021                                                   Strength  NT    Assessment:  Patient presents with symptoms consistent with diagnosis of right wrist and thumb pain,  with conservative intervention.     Patient's limitations  or Problem List includes:  Pain and Tightness in musculature of the right wrist and thumb which interferes with the patient's ability to perform Self Care Tasks (dressing, eating, bathing, hygiene/toileting), Work Tasks, Sleep Patterns, Recreational Activities, Household Chores and Driving  as compared to previous level of function.    Rehab Potential:  Good - Return to full activity, some limitations    Patient will benefit from skilled Occupational Therapy to increase motion and decrease pain to return to previous activity level and resume normal daily tasks and to reach their rehab potential.    Barriers to Learning:  No barrier    Communication Issues:  Patient appears to be able to clearly communicate and understand verbal and written communication and follow directions correctly.    Chart Review: Simple history review with patient    Identified Performance Deficits: bathing/showering, toileting, dressing, health management and maintenance, home establishment and management, meal preparation and cleanup, sleep and leisure activities    Assessment of Occupational Performance:  5 or more Performance Deficits    Clinical Decision Making (Complexity): Low complexity    Treatment Explanation:  The following has been discussed with the patient:  RX ordered/plan of care  Anticipated outcomes  Possible risks and side effects    Plan:  Frequency:  1 X week, once daily  Duration:  for 3 months    Treatment Plan:   Modalities:  US  Therapeutic Exercise:  AROM  Neuromuscular re-education:  Nerve Gliding and Kinesiotaping  Manual Techniques:  Joint mobilization, Friction massage and Myofascial release    Discharge Plan:  Achieve all LTG.  Independent in home treatment program.  Reach maximal therapeutic benefit.    Home Exercise Program:  MFR  FM    Next Visit:

## 2021-11-20 DIAGNOSIS — E78.2 MIXED HYPERLIPIDEMIA: ICD-10-CM

## 2021-11-22 DIAGNOSIS — F43.21 ADJUSTMENT DISORDER WITH DEPRESSED MOOD: Primary | ICD-10-CM

## 2021-11-22 RX ORDER — ATORVASTATIN CALCIUM 20 MG/1
20 TABLET, FILM COATED ORAL DAILY
Qty: 90 TABLET | Refills: 1 | OUTPATIENT
Start: 2021-11-22

## 2021-11-22 NOTE — TELEPHONE ENCOUNTER
atorvastatin (LIPITOR) 20 MG tablet 30 tablet 3 11/11/2021  No   Sig - Route: TAKE 1 TABLET (20 MG) BY MOUTH DAILY - FASTING OFFICE VISIT DUE NOVEMBER 2021 - Oral   Sent to pharmacy as: Atorvastatin Calcium 20 MG Oral Tablet (LIPITOR)   Class: E-Prescribe   Order: [id number]   E-Prescribing Status: Receipt confirmed by pharmacy (11/11/2021 12:57 PM CST)     [facility] [id number] [facility] - [address]    Rx refused. Duplicate/early request. Pharmacy notified.

## 2021-11-23 RX ORDER — FLUOXETINE 10 MG/1
10 CAPSULE ORAL DAILY
Qty: 90 CAPSULE | Refills: 0 | Status: SHIPPED | OUTPATIENT
Start: 2021-11-23 | End: 2022-01-24

## 2021-11-23 NOTE — TELEPHONE ENCOUNTER
Routing refill request to provider for review/approval because:  Medication is reported/historical  Dayanara BOYCE RN  [facility]

## 2021-12-20 ENCOUNTER — NURSE TRIAGE (OUTPATIENT)
Dept: NURSING | Facility: CLINIC | Age: 71
End: 2021-12-20
Payer: COMMERCIAL

## 2021-12-20 NOTE — TELEPHONE ENCOUNTER
I do not know her current medications.  It is fine to print her last list of medications but please let the care facility know that they need to double check and if there is discrepancies let us know.    Thank you    Suhail Vance M.D.

## 2021-12-20 NOTE — TELEPHONE ENCOUNTER
Reviewed with facility nurse    States patient is not able to give them a clear history but hoping to admit her this week     They will do med reconcile with family and send us an update request as needed      Radha CHOWDARY, Triage RN  [facility]

## 2021-12-20 NOTE — TELEPHONE ENCOUNTER
Faxed signed med list to [facility] [address] ([phone])    Kodi Toure RN  [facility]

## 2021-12-20 NOTE — TELEPHONE ENCOUNTER
Printed copy of med list and placed on Dr. Benton (covering providers) desk.    Kodi Toure RN  [facility]

## 2021-12-20 NOTE — TELEPHONE ENCOUNTER
Telephone call:     CHA Chavez from [facility] calling that patient has moved from assisted living to a memory care unit.   She is calling to request a current signed medication list in order for them to administer her medications.   They need her active med list with a provider's signature on each page.     Please fax to: [phone]  Phone number [phone]    Will route to care team.     Violeta Perez RN   12/20/21 11:50 AM  [facility] Nurse Advisor

## 2021-12-21 ENCOUNTER — TELEPHONE (OUTPATIENT)
Dept: FAMILY MEDICINE | Facility: CLINIC | Age: 71
End: 2021-12-21
Payer: COMMERCIAL

## 2021-12-21 RX ORDER — CHOLECALCIFEROL (VITAMIN D3) 50 MCG
1 TABLET ORAL DAILY
Refills: 0 | COMMUNITY
Start: 2021-12-21 | End: 2022-01-24

## 2021-12-21 RX ORDER — CHLORHEXIDINE GLUCONATE 4 %
LIQUID (ML) TOPICAL
COMMUNITY
Start: 2021-12-21 | End: 2022-01-24

## 2021-12-21 NOTE — TELEPHONE ENCOUNTER
CHA Mosquera from [facility] called requesting updated medication list with providers signature.    Nurse reports pt takes vitamin D3  2,000 units daily and women multivitamin once tablet daily. All other medications on file match [facility]'s records.    Vitamin 3 and multivitamin entered on med list. Please print medication list, sign and give to TC or patient care team to fax at [phone]. Dinora Mosquera,

## 2021-12-27 ENCOUNTER — DOCUMENTATION ONLY (OUTPATIENT)
Dept: OTHER | Facility: CLINIC | Age: 71
End: 2021-12-27
Payer: COMMERCIAL

## 2021-12-27 PROBLEM — E55.9 VITAMIN D DEFICIENCY: Status: ACTIVE | Noted: 2021-12-27

## 2021-12-27 PROBLEM — E23.6 OTHER DISORDERS OF PITUITARY GLAND (H): Status: ACTIVE | Noted: 2021-12-27

## 2021-12-27 PROBLEM — H40.9 GLAUCOMA (INCREASED EYE PRESSURE): Status: RESOLVED | Noted: 2021-12-27 | Resolved: 2021-12-27

## 2021-12-27 PROBLEM — I10 ESSENTIAL HYPERTENSION: Status: ACTIVE | Noted: 2021-05-21

## 2021-12-27 PROBLEM — G30.9 DEMENTIA IN ALZHEIMER'S DISEASE (H): Status: ACTIVE | Noted: 2017-11-30

## 2021-12-27 PROBLEM — F02.80 DEMENTIA IN ALZHEIMER'S DISEASE (H): Status: ACTIVE | Noted: 2017-11-30

## 2021-12-27 PROBLEM — Z79.899 OTHER LONG TERM (CURRENT) DRUG THERAPY: Status: ACTIVE | Noted: 2021-12-27

## 2021-12-27 PROBLEM — H25.9 UNSPECIFIED AGE-RELATED CATARACT: Status: ACTIVE | Noted: 2021-12-27

## 2021-12-27 PROBLEM — H40.1131 PRIMARY OPEN ANGLE GLAUCOMA OF BOTH EYES, MILD STAGE: Status: ACTIVE | Noted: 2021-12-27

## 2021-12-27 PROBLEM — F33.8 SEASONAL DEPRESSION (H): Status: ACTIVE | Noted: 2021-05-21

## 2021-12-27 PROBLEM — H40.9 GLAUCOMA (INCREASED EYE PRESSURE): Status: ACTIVE | Noted: 2021-12-27

## 2021-12-27 PROBLEM — E78.00 PURE HYPERCHOLESTEROLEMIA: Status: ACTIVE | Noted: 2021-12-27

## 2021-12-29 ENCOUNTER — ASSISTED LIVING VISIT (OUTPATIENT)
Dept: GERIATRICS | Facility: CLINIC | Age: 71
End: 2021-12-29
Payer: COMMERCIAL

## 2021-12-29 VITALS
BODY MASS INDEX: 22.09 KG/M2 | DIASTOLIC BLOOD PRESSURE: 80 MMHG | TEMPERATURE: 97.6 F | OXYGEN SATURATION: 98 % | HEART RATE: 59 BPM | SYSTOLIC BLOOD PRESSURE: 140 MMHG | RESPIRATION RATE: 16 BRPM | WEIGHT: 129.4 LBS | HEIGHT: 64 IN

## 2021-12-29 DIAGNOSIS — F33.8 OTHER RECURRENT DEPRESSIVE DISORDERS (H): ICD-10-CM

## 2021-12-29 DIAGNOSIS — I10 ESSENTIAL HYPERTENSION: ICD-10-CM

## 2021-12-29 DIAGNOSIS — H40.1131 PRIMARY OPEN ANGLE GLAUCOMA OF BOTH EYES, MILD STAGE: ICD-10-CM

## 2021-12-29 DIAGNOSIS — F02.80 DEMENTIA IN ALZHEIMER'S DISEASE (H): Primary | ICD-10-CM

## 2021-12-29 DIAGNOSIS — E23.6 OTHER DISORDERS OF PITUITARY GLAND (H): ICD-10-CM

## 2021-12-29 DIAGNOSIS — G30.9 DEMENTIA IN ALZHEIMER'S DISEASE (H): Primary | ICD-10-CM

## 2021-12-29 DIAGNOSIS — E55.9 VITAMIN D DEFICIENCY: ICD-10-CM

## 2021-12-29 DIAGNOSIS — E78.00 PURE HYPERCHOLESTEROLEMIA: ICD-10-CM

## 2021-12-29 ASSESSMENT — MIFFLIN-ST. JEOR: SCORE: 1091.95

## 2021-12-29 NOTE — PROGRESS NOTES
"[facility]  PRIMARY CARE PROVIDER AND CLINIC:  Armaan Carr, APRN CNP, [address] / [place] [address]  Chief Complaint   Patient presents with     \[facility] [place] [facility]\"" Care     [facility] Medical Record Number:  [id number]  Place of Service where encounter took place:   [facility] [[id number]]    Marie Connolly  is a 71 year old  (1950), admitted to the above facility on 12/18/21.  Admitted to this facility for  rehab, medical management and nursing care.    HPI:    HPI information obtained from: facility chart records, patient report and [facility] chart review.     Marie \"Jarvis\" moved into [facility] on 8/9/21 with advanced dementia transferred to [facility] care 12/18/21. Staff say she is making friends and adjusting well. Social at baseline and likes to assist others. She is in community room today, able for some small conversation but nonsensical at times.  Says she just ate breakfast (staff denies) and answers a few questions with \"been there and done that\". Ambulates around the unit without assistive device. A little slow with sit to stand from chair. Denies pain, abdominal pain. PMH includes hypertension, depression, hyperlipidemia.    CODE STATUS/ADVANCE DIRECTIVES DISCUSSION:   CPR/Full code   Patient's living condition: lives in an assisted living facility-moved into [facility] care   ALLERGIES: Patient has no known allergies.  PAST MEDICAL HISTORY:  has a past medical history of Dementia in Alzheimer's disease (H) (11/30/2017), Essential hypertension (5/21/2021), Other disorders of pituitary gland (H) (12/27/2021), Other long term (current) drug therapy (12/27/2021), Other recurrent depressive disorders (H) (5/21/2021), Primary open angle glaucoma of both eyes, mild stage (12/27/2021), Pure hypercholesterolemia (12/27/2021), Right wrist pain (11/1/2021), Unspecified age-related cataract (12/27/2021), and Vitamin D deficiency (12/27/2021).  PAST SURGICAL " "HISTORY:   has a past surgical history that includes CATARACT REMOVAL (05/21/2021).  FAMILY HISTORY: family history includes Stomach Cancer in her mother.  SOCIAL HISTORY:   reports that she has never smoked. She has never used smokeless tobacco. She reports current alcohol use of about 2.0 - 3.0 standard drinks of alcohol per week. She reports that she does not use drugs.     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    Post Discharge Medication Reconciliation Status: patient was not discharged from an inpatient facility    Current Outpatient Medications   Medication Sig Dispense Refill     amLODIPine (NORVASC) 2.5 MG tablet Take 2.5 mg by mouth daily       atorvastatin (LIPITOR) 20 MG tablet TAKE 1 TABLET (20 MG) BY MOUTH DAILY - FASTING OFFICE VISIT DUE NOVEMBER 2021 30 tablet 3     donepezil (ARICEPT) 10 MG tablet Take 10 mg by mouth daily       fenofibrate (TRICOR) 145 MG tablet Take 1 tablet (145 mg) by mouth daily 90 tablet 1     FLUoxetine (PROZAC) 10 MG capsule Take 1 capsule (10 mg) by mouth daily 90 capsule 0     memantine (NAMENDA) 10 MG tablet Take 1 tablet (10 mg) by mouth 2 times daily 30 tablet 3     vitamin D3 (CHOLECALCIFEROL) 50 mcg (2000 units) tablet Take 1 tablet (50 mcg) by mouth daily  0     Multiple Vitamins-Minerals (WOMENS MULTIVITAMIN) TABS          ROS:  Limited secondary to cognitive impairment but today pt reports good    Vitals:  BP (!) 140/80   Pulse 59   Temp 97.6  F (36.4  C)   Resp 16   Ht 1.626 m (5' 4\")   Wt 58.7 kg (129 lb 6.4 oz)   SpO2 98%   BMI 22.21 kg/m    Exam:  GENERAL APPEARANCE:  Alert, in no distress  ENT:  Mouth and posterior oropharynx normal, moist mucous membranes, normal hearing acuity  EYES:  Conjunctiva and lids normal  RESP:  respiratory effort and palpation of chest normal, lungs clear to auscultation   CV:  Palpation and auscultation of heart done , regular rate and rhythm, no murmur, rub, or gallop, no edema  ABDOMEN:  no guarding or " rebound  M/S:   without assistive device at baseline   SKIN:  Intact to visualized areas   NEURO:   Cranial nerves 2-12 are normal tested and grossly at patient's baseline  PSYCH:  insight and judgement impaired, memory impaired     Lab/Diagnostic data:  CBC RESULTS: Recent Labs   Lab Test 08/25/21  1458   WBC 5.9   RBC 4.50   HGB 14.7   HCT 43.7   MCV 97   MCH 32.7   MCHC 33.6   RDW 13.7          Last Basic Metabolic Panel:  Recent Labs   Lab Test 08/25/21  1458      POTASSIUM 4.2   CHLORIDE 106   NERIS 9.2   CO2 29   BUN 9   CR 0.89   *       Liver Function Studies -   Recent Labs   Lab Test 08/25/21  1458   PROTTOTAL 7.3   ALBUMIN 3.8   BILITOTAL 0.5   ALKPHOS 55   AST 34   ALT 40       TSH   Date Value Ref Range Status   08/25/2021 1.08 0.40 - 4.00 mU/L Final       No results found for: A1C     Recent Labs   Lab Test 08/25/21  1458   CHOL 240*   HDL 61   *   TRIG 671*         ASSESSMENT/PLAN:  (G30.9,  F02.80) Dementia in Alzheimer's disease (H)  (primary encounter diagnosis)  Comment: moved into memory care  Plan:   -staff assist as needed with ADLs  -memantine 10 mg po BID  -donepezil 10 mg po daily (discuss taper with family)    (F33.8) Other recurrent depressive disorders (H)  Comment: stable   Plan:   -fluoxetine 10 mg po daily     (E23.6) Other disorders of pituitary gland (H)  Comment: enlarged pituitary per chart   Plan:   -continue to monitor     (E78.00) Pure hypercholesterolemia  Comment: ongoing   Plan:   -lipitor 20 mg po daily   -fenofibrate 145 mg po daily   -updated fasting lipid with next labs     (E55.9) Vitamin D deficiency  Comment: dexa 6/5/2018 showed osteopenia. Repeat Dexa if family desires   Plan:   -continue with vitamin D supplement     (I10) Essential hypertension  Comment: stable   Plan:  -amlodipine 2.5 mg po daily   -VS with visits   -BMP periodically     (H40.9283) Primary open angle glaucoma of both eyes, mild stage  Comment: per hx  Plan:  -prior use of   latanoprost (XALATAN) 0.005 % ophthalmic solution Place 1 Drop into both eyes at bedtime        Electronically signed by:  JACKI Luther CNP

## 2021-12-29 NOTE — LETTER
"    12/29/2021        RE: Marie Connolly  [address]  [address]  [place] [address]        [facility]  PRIMARY CARE PROVIDER AND CLINIC:  Armaan Carr, JACKI CNP, [address] / [place] [address]  Chief Complaint   Patient presents with     Establish Care     [facility] Medical Record Number:  [id number]  Place of Service where encounter took place:  [facility] [[id number]]    Marie Connolly  is a 71 year old  (1950), admitted to the above facility on 12/18/21.  Admitted to this facility for  rehab, medical management and nursing care.    HPI:    HPI information obtained from: facility chart records, patient report and [facility] chart review.     Marie \"Jarvis\" moved into [facility] on 8/9/21 with advanced dementia transferred to memory care 12/18/21. Staff say she is making friends and adjusting well. Social at baseline and likes to assist others. She is in community room today, able for some small conversation but nonsensical at times.  Says she just ate breakfast (staff denies) and answers a few questions with \"been there and done that\". Ambulates around the unit without assistive device. A little slow with sit to stand from chair. Denies pain, abdominal pain. PMH includes hypertension, depression, hyperlipidemia.    CODE STATUS/ADVANCE DIRECTIVES DISCUSSION:   CPR/Full code   Patient's living condition: lives in an assisted living facility-moved into memory care   ALLERGIES: Patient has no known allergies.  PAST MEDICAL HISTORY:  has a past medical history of Dementia in Alzheimer's disease (H) (11/30/2017), Essential hypertension (5/21/2021), Other disorders of pituitary gland (H) (12/27/2021), Other long term (current) drug therapy (12/27/2021), Other recurrent depressive disorders (H) (5/21/2021), Primary open angle glaucoma of both eyes, mild stage (12/27/2021), Pure hypercholesterolemia (12/27/2021), Right wrist pain (11/1/2021), " "Unspecified age-related cataract (12/27/2021), and Vitamin D deficiency (12/27/2021).  PAST SURGICAL HISTORY:   has a past surgical history that includes CATARACT REMOVAL (05/21/2021).  FAMILY HISTORY: family history includes Stomach Cancer in her mother.  SOCIAL HISTORY:   reports that she has never smoked. She has never used smokeless tobacco. She reports current alcohol use of about 2.0 - 3.0 standard drinks of alcohol per week. She reports that she does not use drugs.     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    Post Discharge Medication Reconciliation Status: patient was not discharged from an inpatient facility    Current Outpatient Medications   Medication Sig Dispense Refill     amLODIPine (NORVASC) 2.5 MG tablet Take 2.5 mg by mouth daily       atorvastatin (LIPITOR) 20 MG tablet TAKE 1 TABLET (20 MG) BY MOUTH DAILY - FASTING OFFICE VISIT DUE NOVEMBER 2021 30 tablet 3     donepezil (ARICEPT) 10 MG tablet Take 10 mg by mouth daily       fenofibrate (TRICOR) 145 MG tablet Take 1 tablet (145 mg) by mouth daily 90 tablet 1     FLUoxetine (PROZAC) 10 MG capsule Take 1 capsule (10 mg) by mouth daily 90 capsule 0     memantine (NAMENDA) 10 MG tablet Take 1 tablet (10 mg) by mouth 2 times daily 30 tablet 3     vitamin D3 (CHOLECALCIFEROL) 50 mcg (2000 units) tablet Take 1 tablet (50 mcg) by mouth daily  0     Multiple Vitamins-Minerals (WOMENS MULTIVITAMIN) TABS          ROS:  Limited secondary to cognitive impairment but today pt reports good    Vitals:  BP (!) 140/80   Pulse 59   Temp 97.6  F (36.4  C)   Resp 16   Ht 1.626 m (5' 4\")   Wt 58.7 kg (129 lb 6.4 oz)   SpO2 98%   BMI 22.21 kg/m    Exam:  GENERAL APPEARANCE:  Alert, in no distress  ENT:  Mouth and posterior oropharynx normal, moist mucous membranes, normal hearing acuity  EYES:  Conjunctiva and lids normal  RESP:  respiratory effort and palpation of chest normal, lungs clear to auscultation   CV:  Palpation and auscultation of " heart done , regular rate and rhythm, no murmur, rub, or gallop, no edema  ABDOMEN:  no guarding or rebound  M/S:   without assistive device at baseline   SKIN:  Intact to visualized areas   NEURO:   Cranial nerves 2-12 are normal tested and grossly at patient's baseline  PSYCH:  insight and judgement impaired, memory impaired     Lab/Diagnostic data:  CBC RESULTS: Recent Labs   Lab Test 08/25/21  1458   WBC 5.9   RBC 4.50   HGB 14.7   HCT 43.7   MCV 97   MCH 32.7   MCHC 33.6   RDW 13.7          Last Basic Metabolic Panel:  Recent Labs   Lab Test 08/25/21  1458      POTASSIUM 4.2   CHLORIDE 106   NERIS 9.2   CO2 29   BUN 9   CR 0.89   *       Liver Function Studies -   Recent Labs   Lab Test 08/25/21  1458   PROTTOTAL 7.3   ALBUMIN 3.8   BILITOTAL 0.5   ALKPHOS 55   AST 34   ALT 40       TSH   Date Value Ref Range Status   08/25/2021 1.08 0.40 - 4.00 mU/L Final       No results found for: A1C     Recent Labs   Lab Test 08/25/21  1458   CHOL 240*   HDL 61   *   TRIG 671*         ASSESSMENT/PLAN:  (G30.9,  F02.80) Dementia in Alzheimer's disease (H)  (primary encounter diagnosis)  Comment: moved into memory care  Plan:   -staff assist as needed with ADLs  -memantine 10 mg po BID  -donepezil 10 mg po daily (discuss taper with family)    (F33.8) Other recurrent depressive disorders (H)  Comment: stable   Plan:   -fluoxetine 10 mg po daily     (E23.6) Other disorders of pituitary gland (H)  Comment: enlarged pituitary per chart   Plan:   -continue to monitor     (E78.00) Pure hypercholesterolemia  Comment: ongoing   Plan:   -lipitor 20 mg po daily   -fenofibrate 145 mg po daily   -updated fasting lipid with next labs     (E55.9) Vitamin D deficiency  Comment: dexa 6/5/2018 showed osteopenia. Repeat Dexa if family desires   Plan:   -continue with vitamin D supplement     (I10) Essential hypertension  Comment: stable   Plan:  -amlodipine 2.5 mg po daily   -VS with visits   -[facility] periodically      (Y75.4252) Primary open angle glaucoma of both eyes, mild stage  Comment: per hx  Plan:  -prior use of  latanoprost (XALATAN) 0.005 % ophthalmic solution Place 1 Drop into both eyes at bedtime        Electronically signed by:  JACKI Luther CNP                           Sincerely,        JACKI Luther CNP

## 2022-01-21 ENCOUNTER — LAB REQUISITION (OUTPATIENT)
Dept: LAB | Facility: CLINIC | Age: 72
End: 2022-01-21
Payer: COMMERCIAL

## 2022-01-21 DIAGNOSIS — U07.1 COVID-19: ICD-10-CM

## 2022-01-21 PROCEDURE — U0005 INFEC AGEN DETEC AMPLI PROBE: HCPCS | Mod: ORL | Performed by: INTERNAL MEDICINE

## 2022-01-22 LAB — SARS-COV-2 RNA RESP QL NAA+PROBE: NEGATIVE

## 2022-01-24 DIAGNOSIS — F43.21 ADJUSTMENT DISORDER WITH DEPRESSED MOOD: ICD-10-CM

## 2022-01-24 DIAGNOSIS — E78.2 MIXED HYPERLIPIDEMIA: ICD-10-CM

## 2022-01-24 DIAGNOSIS — E55.9 VITAMIN D DEFICIENCY: ICD-10-CM

## 2022-01-24 DIAGNOSIS — I10 ESSENTIAL HYPERTENSION: Primary | ICD-10-CM

## 2022-01-24 DIAGNOSIS — F02.80 ALZHEIMER'S DEMENTIA WITHOUT BEHAVIORAL DISTURBANCE, UNSPECIFIED TIMING OF DEMENTIA ONSET: ICD-10-CM

## 2022-01-24 DIAGNOSIS — G30.9 ALZHEIMER'S DEMENTIA WITHOUT BEHAVIORAL DISTURBANCE, UNSPECIFIED TIMING OF DEMENTIA ONSET: ICD-10-CM

## 2022-01-24 NOTE — TELEPHONE ENCOUNTER
Fax from [facility] sent to former PCP office seeking refill of     Amlodipine 2.5mg (historical in chart)  Atorvastatin 20mg    LOV 12- [place], patient established care with you at [facility]    Per chart, other medications have not been sent to [facility] either, pended those as well.  Review and fill as appropriate    Memantine 10mg  Donepezil 10mg (historical in chart)  Fluoxetine 10mg  Fenofibrate 145mg  Vitamin D3 2000units (historical in chart)  Multi-Vitamin (historical in chart)    Ruma Serra RT (R)

## 2022-01-25 RX ORDER — MEMANTINE HYDROCHLORIDE 10 MG/1
10 TABLET ORAL 2 TIMES DAILY
Qty: 60 TABLET | Refills: 11 | Status: SHIPPED | OUTPATIENT
Start: 2022-01-25 | End: 2022-12-23

## 2022-01-25 RX ORDER — CHOLECALCIFEROL (VITAMIN D3) 50 MCG
1 TABLET ORAL DAILY
Qty: 30 TABLET | Refills: 11 | Status: SHIPPED | OUTPATIENT
Start: 2022-01-25 | End: 2023-01-20

## 2022-01-25 RX ORDER — FENOFIBRATE 145 MG/1
145 TABLET, COATED ORAL DAILY
Qty: 30 TABLET | Refills: 11 | Status: SHIPPED | OUTPATIENT
Start: 2022-01-25 | End: 2022-12-23

## 2022-01-25 RX ORDER — DONEPEZIL HYDROCHLORIDE 10 MG/1
10 TABLET, FILM COATED ORAL DAILY
Qty: 30 TABLET | Refills: 11 | Status: SHIPPED | OUTPATIENT
Start: 2022-01-25 | End: 2022-02-23

## 2022-01-25 RX ORDER — CHLORHEXIDINE GLUCONATE 4 %
1 LIQUID (ML) TOPICAL DAILY
Qty: 1 TABLET | Refills: 11 | Status: SHIPPED | OUTPATIENT
Start: 2022-01-25 | End: 2022-02-11

## 2022-01-25 RX ORDER — ATORVASTATIN CALCIUM 20 MG/1
20 TABLET, FILM COATED ORAL DAILY
Qty: 30 TABLET | Refills: 11 | Status: SHIPPED | OUTPATIENT
Start: 2022-01-25 | End: 2022-02-17

## 2022-01-25 RX ORDER — FLUOXETINE 10 MG/1
10 CAPSULE ORAL DAILY
Qty: 30 CAPSULE | Refills: 11 | Status: SHIPPED | OUTPATIENT
Start: 2022-01-25 | End: 2022-12-21

## 2022-01-25 RX ORDER — AMLODIPINE BESYLATE 2.5 MG/1
2.5 TABLET ORAL DAILY
Qty: 30 TABLET | Refills: 11 | Status: SHIPPED | OUTPATIENT
Start: 2022-01-25 | End: 2022-12-23

## 2022-01-27 ENCOUNTER — LAB REQUISITION (OUTPATIENT)
Dept: LAB | Facility: CLINIC | Age: 72
End: 2022-01-27
Payer: COMMERCIAL

## 2022-01-27 DIAGNOSIS — U07.1 COVID-19: ICD-10-CM

## 2022-01-27 PROCEDURE — U0005 INFEC AGEN DETEC AMPLI PROBE: HCPCS | Mod: ORL | Performed by: INTERNAL MEDICINE

## 2022-01-28 ENCOUNTER — ASSISTED LIVING VISIT (OUTPATIENT)
Dept: GERIATRICS | Facility: CLINIC | Age: 72
End: 2022-01-28
Payer: COMMERCIAL

## 2022-01-28 VITALS
WEIGHT: 130.6 LBS | DIASTOLIC BLOOD PRESSURE: 67 MMHG | BODY MASS INDEX: 22.3 KG/M2 | HEART RATE: 70 BPM | TEMPERATURE: 98.7 F | SYSTOLIC BLOOD PRESSURE: 140 MMHG | OXYGEN SATURATION: 94 % | RESPIRATION RATE: 20 BRPM | HEIGHT: 64 IN

## 2022-01-28 DIAGNOSIS — F02.80 DEMENTIA IN ALZHEIMER'S DISEASE (H): ICD-10-CM

## 2022-01-28 DIAGNOSIS — E78.2 MIXED HYPERLIPIDEMIA: ICD-10-CM

## 2022-01-28 DIAGNOSIS — I10 ESSENTIAL HYPERTENSION: Primary | ICD-10-CM

## 2022-01-28 DIAGNOSIS — G30.9 DEMENTIA IN ALZHEIMER'S DISEASE (H): ICD-10-CM

## 2022-01-28 DIAGNOSIS — F43.21 ADJUSTMENT DISORDER WITH DEPRESSED MOOD: ICD-10-CM

## 2022-01-28 DIAGNOSIS — F33.8 OTHER RECURRENT DEPRESSIVE DISORDERS (H): ICD-10-CM

## 2022-01-28 DIAGNOSIS — M85.80 OSTEOPENIA, UNSPECIFIED LOCATION: ICD-10-CM

## 2022-01-28 LAB — SARS-COV-2 RNA RESP QL NAA+PROBE: POSITIVE

## 2022-01-28 ASSESSMENT — MIFFLIN-ST. JEOR: SCORE: 1097.4

## 2022-01-28 NOTE — LETTER
1/28/2022        RE: Marie Connolly  [address]  [address]  [facility] [address]        Marie Connolly is a 71 year old female seen January 28, 2022 at [facility] Memory Care unit where she has resided for 5 months (admit 8/2021), moved to Memory Care unit and turned over to S 12/2021, and seen for initial visit.  Pt goes by Jarvis.     Ambulatory without device   Well groomed, looks like a visitor, blue jeans and keds. Feeling well. Denies pain.  She smiles and laughs, seen later up dancing.     AL staff reports likes to dance and help other residents.   Usually cooperative, but has been known to strike out when agitated.   She is independent in her ADLs, but needs reminders to change clothes and bathe.     By chart review, pt has had cognitive decline over the past 12 years, followed by [facility]    First presented to her primary doctor in 2009 with self-reported memory loss, referred to Neurology in 2013, did Neuropsych testing in 2014    She failed a "[employer] behind the wheel driving test in February 2020   Mini-cog in October 2020 was 0  She has been on donepezil and memantine for several years.       She is also treated for HTN, hypertriglyceridemia, OA and osteopenia.  She has not been hospitalized any time in the past 20 years    Past Medical History:   Diagnosis Date     Dementia in Alzheimer's disease (H) 11/30/2017     Essential hypertension 5/21/2021     Other disorders of pituitary gland (H) 12/27/2021     Other long term (current) drug therapy 12/27/2021     Other recurrent depressive disorders (H) 5/21/2021     Primary open angle glaucoma of both eyes, mild stage 12/27/2021     Pure hypercholesterolemia 12/27/2021     Right wrist pain 11/1/2021     Unspecified age-related cataract 12/27/2021     Vitamin D deficiency 12/27/2021       Past Surgical History:   Procedure Laterality Date     CATARACT REMOVAL  05/21/2021     SH:  Pt previously lived alone, townhouse in [place] with services  "and family support  Two sons and 2 daughters, son Preston is first contact   Retired   Native of [facility] [place].   Non smoker    ROS: ambulatory without device  Wt Readings from Last 5 Encounters:   01/28/22 59.2 kg (130 lb 9.6 oz)   12/29/21 58.7 kg (129 lb 6.4 oz)   09/15/21 59.2 kg (130 lb 8 oz)   08/25/21 56.2 kg (124 lb)      EXAM:   NAD  BP (!) 140/67   Pulse 70   Temp 98.7  F (37.1  C)   Resp 20   Ht 1.626 m (5' 4\")   Wt 59.2 kg (130 lb 9.6 oz)   SpO2 94%   BMI 22.42 kg/m     Neck supple without adenopathy  Lungs clear bilaterally with good air movement  Heart RRR s1s2, I/VI DAVION  Abd soft, NT, no distention or guarding, +BS  Ext without edema  Neuro: limited history, speech is normal but content confused.    Ambulates without device, dances  Psych: affect okay, smiling and pleasant.     Last Comprehensive Metabolic Panel:  Sodium   Date Value Ref Range Status   08/25/2021 140 133 - 144 mmol/L Final     Potassium   Date Value Ref Range Status   08/25/2021 4.2 3.4 - 5.3 mmol/L Final     Chloride   Date Value Ref Range Status   08/25/2021 106 94 - 109 mmol/L Final     Carbon Dioxide (CO2)   Date Value Ref Range Status   08/25/2021 29 20 - 32 mmol/L Final     Anion Gap   Date Value Ref Range Status   08/25/2021 5 3 - 14 mmol/L Final     Glucose   Date Value Ref Range Status   08/25/2021 101 (H) 70 - 99 mg/dL Final     Urea Nitrogen   Date Value Ref Range Status   08/25/2021 9 7 - 30 mg/dL Final     Creatinine   Date Value Ref Range Status   08/25/2021 0.89 0.52 - 1.04 mg/dL Final     GFR Estimate   Date Value Ref Range Status   08/25/2021 66 >60 mL/min/1.73m2 Final     Comment:     As of July 11, 2021, eGFR is calculated by the CKD-EPI creatinine equation, without race adjustment. eGFR can be influenced by muscle mass, exercise, and diet. The reported eGFR is an estimation only and is only applicable if the renal function is stable.     Calcium   Date Value Ref Range Status   08/25/2021 9.2 8.5 - 10.1 mg/dL " Final     Lab Results   Component Value Date    AST 34 08/25/2021      ALBUMIN 3.8 08/25/2021      ALKPHOS 55 08/25/2021     Lab Results   Component Value Date    CHOL 240 08/25/2021     Lab Results   Component Value Date    HDL 61 08/25/2021       08/25/2021     Lab Results   Component Value Date    TRIG 671 08/25/2021     Lab Results   Component Value Date    WBC 5.9 08/25/2021      HGB 14.7 08/25/2021      MCV 97 08/25/2021       08/25/2021     TSH   Date Value Ref Range Status   08/25/2021 1.08 0.40 - 4.00 mU/L Final      CT BRAIN wo CONTRAST  3/18/2021   INDICATION: Fall, confusion.   INTRACRANIAL CONTENTS: No intracranial hemorrhage, extraaxial   collection, or mass effect.  No CT evidence of acute infarct.   Moderate presumed chronic small vessel ischemic changes. Moderate   generalized volume loss. No hydrocephalus.   IMPRESSION:   1.  No CT evidence for acute intracranial process.   2.  Brain atrophy and presumed chronic microvascular ischemic changes     ECHO 9/17/2021   Interpretation Summary  The visual ejection fraction is 60-65%. No regional wall motion abnormalities noted.  The right ventricle is normal in size and function. Right ventricular systolic  pressure could not be approximated due to inadequate tricuspid regurgitation.  There is moderate aortic sclerosis of the non-coronary cusp. The mean AoV  pressure gradient is 7.2 mmHg. No significant stenosis. There is mild (1+) aortic regurgitation.  The inferior vena cava was normal in size with preserved respiratory variability.  There is no pericardial effusion.      IMP/PLAN:    (I10) Essential hypertension   Comment: no proteinuria when checked in 8/2021   BP Readings from Last 3 Encounters:   01/28/22 (!) 140/67   12/29/21 (!) 140/80   09/15/21 100/68      Plan: amlodipine 2.5 mg/day    (G30.9,  F02.80) Dementia in Alzheimer's disease (H)  Comment: early onset with gradual progression   Plan: [facility] Memory Care unit for assist with med  admin, meals, activity, secure unit  She remains on donepezil 10 mg/day and memantine 10 mg bid        (F33.8) Other recurrent depressive disorders (H)  (F43.21) Adjustment disorder with depressed mood  Comment: by history, seems to be adjusting to Memory Care   Plan: fluoxetine 10 mg/day     (E78.2) Mixed hyperlipidemia  Comment: no documentation of CV event, but does have SVID on imaging   Plan: atorvastatin 20 mg/day and fenofibrate 145 mg/day.   The latter was started after above high TG level, would recheck lipid panel at next lab draw.     (M85.80) Osteopenia, unspecified location  Comment: remains ambulatory   Plan: vit D 50 mcg/day, dietary calcium.   Follow        Lisandra Ronquillo MD         Sincerely,        Lisandra Ronquillo MD

## 2022-01-28 NOTE — PROGRESS NOTES
Marie Connolly is a 71 year old female seen January 28, 2022 at [facility] Memory [facility] unit where she has resided for 5 months (admit 8/2021), moved to Memory Care unit and turned over to S 12/2021, and seen for initial visit.  Pt goes by Jarvis.     Ambulatory without device   Well groomed, looks like a visitor, blue jeans and keds. Feeling well. Denies pain.  She smiles and laughs, seen later up dancing.     AL staff reports likes to dance and help other residents.   Usually cooperative, but has been known to strike out when agitated.   She is independent in her ADLs, but needs reminders to change clothes and bathe.     By chart review, pt has had cognitive decline over the past 12 years, followed by [facility]    First presented to her primary doctor in 2009 with self-reported memory loss, referred to Neurology in 2013, did Neuropsych testing in 2014    She failed a [employer] behind the wheel driving test in February 2020   Mini-cog in October 2020 was 0  She has been on donepezil and memantine for several years.       She is also treated for HTN, hypertriglyceridemia, OA and osteopenia.  She has not been hospitalized any time in the past 20 years    Past Medical History:   Diagnosis Date     Dementia in Alzheimer's disease (H) 11/30/2017     Essential hypertension 5/21/2021     Other disorders of pituitary gland (H) 12/27/2021     Other long term (current) drug therapy 12/27/2021     Other recurrent depressive disorders (H) 5/21/2021     Primary open angle glaucoma of both eyes, mild stage 12/27/2021     Pure hypercholesterolemia 12/27/2021     Right wrist pain 11/1/2021     Unspecified age-related cataract 12/27/2021     Vitamin D deficiency 12/27/2021       Past Surgical History:   Procedure Laterality Date     CATARACT REMOVAL  05/21/2021     SH:  Pt previously lived alone, townhouse in [place] with services and family support  Two sons and 2 daughters, son Preston is first contact   Retired  "  Native of [facility] [place].   Non smoker    ROS: ambulatory without device  Wt Readings from Last 5 Encounters:   01/28/22 59.2 kg (130 lb 9.6 oz)   12/29/21 58.7 kg (129 lb 6.4 oz)   09/15/21 59.2 kg (130 lb 8 oz)   08/25/21 56.2 kg (124 lb)      EXAM:   NAD  BP (!) 140/67   Pulse 70   Temp 98.7  F (37.1  C)   Resp 20   Ht 1.626 m (5' 4\")   Wt 59.2 kg (130 lb 9.6 oz)   SpO2 94%   BMI 22.42 kg/m     Neck supple without adenopathy  Lungs clear bilaterally with good air movement  Heart RRR s1s2, I/VI DAVION  Abd soft, NT, no distention or guarding, +BS  Ext without edema  Neuro: limited history, speech is normal but content confused.    Ambulates without device, dances  Psych: affect okay, smiling and pleasant.     Last Comprehensive Metabolic Panel:  Sodium   Date Value Ref Range Status   08/25/2021 140 133 - 144 mmol/L Final     Potassium   Date Value Ref Range Status   08/25/2021 4.2 3.4 - 5.3 mmol/L Final     Chloride   Date Value Ref Range Status   08/25/2021 106 94 - 109 mmol/L Final     Carbon Dioxide (CO2)   Date Value Ref Range Status   08/25/2021 29 20 - 32 mmol/L Final     Anion Gap   Date Value Ref Range Status   08/25/2021 5 3 - 14 mmol/L Final     Glucose   Date Value Ref Range Status   08/25/2021 101 (H) 70 - 99 mg/dL Final     Urea Nitrogen   Date Value Ref Range Status   08/25/2021 9 7 - 30 mg/dL Final     Creatinine   Date Value Ref Range Status   08/25/2021 0.89 0.52 - 1.04 mg/dL Final     GFR Estimate   Date Value Ref Range Status   08/25/2021 66 >60 mL/min/1.73m2 Final     Comment:     As of July 11, 2021, eGFR is calculated by the CKD-EPI creatinine equation, without race adjustment. eGFR can be influenced by muscle mass, exercise, and diet. The reported eGFR is an estimation only and is only applicable if the renal function is stable.     Calcium   Date Value Ref Range Status   08/25/2021 9.2 8.5 - 10.1 mg/dL Final     Lab Results   Component Value Date    AST 34 08/25/2021      ALBUMIN 3.8 " 08/25/2021      ALKPHOS 55 08/25/2021     Lab Results   Component Value Date    CHOL 240 08/25/2021     Lab Results   Component Value Date    HDL 61 08/25/2021       08/25/2021     Lab Results   Component Value Date    TRIG 671 08/25/2021     Lab Results   Component Value Date    WBC 5.9 08/25/2021      HGB 14.7 08/25/2021      MCV 97 08/25/2021       08/25/2021     TSH   Date Value Ref Range Status   08/25/2021 1.08 0.40 - 4.00 mU/L Final      CT BRAIN wo CONTRAST  3/18/2021   INDICATION: Fall, confusion.   INTRACRANIAL CONTENTS: No intracranial hemorrhage, extraaxial   collection, or mass effect.  No CT evidence of acute infarct.   Moderate presumed chronic small vessel ischemic changes. Moderate   generalized volume loss. No hydrocephalus.   IMPRESSION:   1.  No CT evidence for acute intracranial process.   2.  Brain atrophy and presumed chronic microvascular ischemic changes     ECHO 9/17/2021   Interpretation Summary  The visual ejection fraction is 60-65%. No regional wall motion abnormalities noted.  The right ventricle is normal in size and function. Right ventricular systolic  pressure could not be approximated due to inadequate tricuspid regurgitation.  There is moderate aortic sclerosis of the non-coronary cusp. The mean AoV  pressure gradient is 7.2 mmHg. No significant stenosis. There is mild (1+) aortic regurgitation.  The inferior vena cava was normal in size with preserved respiratory variability.  There is no pericardial effusion.      IMP/PLAN:    (I10) Essential hypertension   Comment: no proteinuria when checked in 8/2021   BP Readings from Last 3 Encounters:   01/28/22 (!) 140/67   12/29/21 (!) 140/80   09/15/21 100/68      Plan: amlodipine 2.5 mg/day    (G30.9,  F02.80) Dementia in Alzheimer's disease (H)  Comment: early onset with gradual progression   Plan: [facility] Memory Care unit for assist with med admin, meals, activity, secure unit  She remains on donepezil 10 mg/day and  memantine 10 mg bid        (F33.8) Other recurrent depressive disorders (H)  (F43.21) Adjustment disorder with depressed mood  Comment: by history, seems to be adjusting to Memory Care   Plan: fluoxetine 10 mg/day     (E78.2) Mixed hyperlipidemia  Comment: no documentation of CV event, but does have SVID on imaging   Plan: atorvastatin 20 mg/day and fenofibrate 145 mg/day.   The latter was started after above high TG level, would recheck lipid panel at next lab draw.     (M85.80) Osteopenia, unspecified location  Comment: remains ambulatory   Plan: vit D 50 mcg/day, dietary calcium.   Follow        Lisandra Ronquillo MD

## 2022-02-01 PROBLEM — E23.6 OTHER DISORDERS OF PITUITARY GLAND (H): Status: RESOLVED | Noted: 2021-12-27 | Resolved: 2022-02-01

## 2022-02-09 ENCOUNTER — ASSISTED LIVING VISIT (OUTPATIENT)
Dept: GERIATRICS | Facility: CLINIC | Age: 72
End: 2022-02-09
Payer: COMMERCIAL

## 2022-02-09 VITALS
RESPIRATION RATE: 19 BRPM | BODY MASS INDEX: 22.3 KG/M2 | SYSTOLIC BLOOD PRESSURE: 142 MMHG | WEIGHT: 130.6 LBS | HEART RATE: 95 BPM | TEMPERATURE: 97.4 F | HEIGHT: 64 IN | OXYGEN SATURATION: 98 % | DIASTOLIC BLOOD PRESSURE: 73 MMHG

## 2022-02-09 DIAGNOSIS — U07.1 COVID-19: Primary | ICD-10-CM

## 2022-02-09 DIAGNOSIS — E78.2 MIXED HYPERLIPIDEMIA: ICD-10-CM

## 2022-02-09 DIAGNOSIS — E55.9 VITAMIN D DEFICIENCY: ICD-10-CM

## 2022-02-09 ASSESSMENT — MIFFLIN-ST. JEOR: SCORE: 1097.4

## 2022-02-09 NOTE — PROGRESS NOTES
[facility]  [facility] Medical Record Number:  [id number]  Place of Service where encounter took place:  [facility] [[id number]]  Chief Complaint   Patient presents with     RECHECK       HPI:    Marie Connolly  is a 71 year old (1950), who is being seen today for an episodic care visit.  HPI information obtained from: facility chart records and facility staff. Today's concern is:    Seen today for follow up to COVID-19 infection. Diagnosed 1/27/22 and was having difficultly staying in her room so family took her on extended JESUS for quarantine. She is back and now unsure about being in the community areas. Staff is encouraging her out. She is calm and pleasant. No cough during visit. No s/s of lingering COVID.  Ambulates around the unit without assistive device. A little slow with sit to stand from chair. Denies pain, abdominal pain. PMH includes hypertension, depression, hyperlipidemia.    Past Medical and Surgical History reviewed in Epic today.    MEDICATIONS:    Current Outpatient Medications   Medication Sig Dispense Refill     amLODIPine (NORVASC) 2.5 MG tablet Take 1 tablet (2.5 mg) by mouth daily 30 tablet 11     atorvastatin (LIPITOR) 20 MG tablet Take 1 tablet (20 mg) by mouth daily 30 tablet 11     donepezil (ARICEPT) 10 MG tablet Take 1 tablet (10 mg) by mouth daily 30 tablet 11     fenofibrate (TRICOR) 145 MG tablet Take 1 tablet (145 mg) by mouth daily 30 tablet 11     FLUoxetine (PROZAC) 10 MG capsule Take 1 capsule (10 mg) by mouth daily 30 capsule 11     memantine (NAMENDA) 10 MG tablet Take 1 tablet (10 mg) by mouth 2 times daily 60 tablet 11     Multiple Vitamins-Minerals (WOMENS MULTIVITAMIN) TABS Take 1 tablet by mouth daily 1 tablet 11     vitamin D3 (CHOLECALCIFEROL) 50 mcg (2000 units) tablet Take 1 tablet (50 mcg) by mouth daily 30 tablet 11     REVIEW OF SYSTEMS:  Limited secondary to cognitive impairment but today pt reports fine  "    Objective:  BP (!) 142/73   Pulse 95   Temp 97.4  F (36.3  C)   Resp 19   Ht 1.626 m (5' 4\")   Wt 59.2 kg (130 lb 9.6 oz)   SpO2 98%   BMI 22.42 kg/m    Exam:  GENERAL APPEARANCE:  Alert, in no distress, calm and pleasant   ENT:  Mouth and posterior oropharynx normal, moist mucous membranes, normal hearing acuity  EYES:  Conjunctiva and lids normal  RESP:  respiratory effort and palpation of chest normal, lungs clear to auscultation   CV:  Palpation and auscultation of heart done , regular rate and rhythm, no murmur, rub, or gallop, no edema  ABDOMEN:  no guarding or rebound  M/S:   without assistive device at baseline   SKIN:  Intact to visualized areas   NEURO:   Cranial nerves 2-12 are normal tested and grossly at patient's baseline  PSYCH:  insight and judgement impaired, memory impaired     Labs:   CBC RESULTS: Recent Labs   Lab Test 08/25/21  1458   WBC 5.9   RBC 4.50   HGB 14.7   HCT 43.7   MCV 97   MCH 32.7   MCHC 33.6   RDW 13.7          Last Basic Metabolic Panel:  Recent Labs   Lab Test 08/25/21  1458      POTASSIUM 4.2   CHLORIDE 106   NERIS 9.2   CO2 29   BUN 9   CR 0.89   *       Liver Function Studies -   Recent Labs   Lab Test 08/25/21  1458   PROTTOTAL 7.3   ALBUMIN 3.8   BILITOTAL 0.5   ALKPHOS 55   AST 34   ALT 40       TSH   Date Value Ref Range Status   08/25/2021 1.08 0.40 - 4.00 mU/L Final       No results found for: A1C      Recent Labs   Lab Test 08/25/21  1458   CHOL 240*   HDL 61   *   TRIG 671*     ASSESSMENT/PLAN:  (U07.1) COVID-19  (primary encounter diagnosis)  Comment: no lingering s/s  Plan:   -updated weight from facility   -encourage out to community areas with restrictions over    (E78.2) Mixed hyperlipidemia  Comment: previous labs   Plan:   -fasting lipid next week  -lipitor 20 mg po daily   -fenofibrate 145 mg po daily         Electronically signed by:  JACKI Luther CNP           "

## 2022-02-09 NOTE — LETTER
2/9/2022        RE: Marie Connolly  [address]  [address]  [place] [address]        [facility]  [place] Medical Record Number:  [id number]  Place of Service where encounter took place:  [facility] [[id number]]  Chief Complaint   Patient presents with     RECHECK       HPI:    Marie Connolly  is a 71 year old (1950), who is being seen today for an episodic care visit.  HPI information obtained from: facility chart records and facility staff. Today's concern is:    Seen today for follow up to COVID-19 infection. Diagnosed 1/27/22 and was having difficultly staying in her room so family took her on extended JESUS for quarantine. She is back and now unsure about being in the community areas. Staff is encouraging her out. She is calm and pleasant. No cough during visit. No s/s of lingering COVID.  Ambulates around the unit without assistive device. A little slow with sit to stand from chair. Denies pain, abdominal pain. PMH includes hypertension, depression, hyperlipidemia.    Past Medical and Surgical History reviewed in Epic today.    MEDICATIONS:    Current Outpatient Medications   Medication Sig Dispense Refill     amLODIPine (NORVASC) 2.5 MG tablet Take 1 tablet (2.5 mg) by mouth daily 30 tablet 11     atorvastatin (LIPITOR) 20 MG tablet Take 1 tablet (20 mg) by mouth daily 30 tablet 11     donepezil (ARICEPT) 10 MG tablet Take 1 tablet (10 mg) by mouth daily 30 tablet 11     fenofibrate (TRICOR) 145 MG tablet Take 1 tablet (145 mg) by mouth daily 30 tablet 11     FLUoxetine (PROZAC) 10 MG capsule Take 1 capsule (10 mg) by mouth daily 30 capsule 11     memantine (NAMENDA) 10 MG tablet Take 1 tablet (10 mg) by mouth 2 times daily 60 tablet 11     Multiple Vitamins-Minerals (WOMENS MULTIVITAMIN) TABS Take 1 tablet by mouth daily 1 tablet 11     vitamin D3 (CHOLECALCIFEROL) 50 mcg (2000 units) tablet Take 1 tablet (50 mcg) by mouth daily 30 tablet 11  "    REVIEW OF SYSTEMS:  Limited secondary to cognitive impairment but today pt reports fine     Objective:  BP (!) 142/73   Pulse 95   Temp 97.4  F (36.3  C)   Resp 19   Ht 1.626 m (5' 4\")   Wt 59.2 kg (130 lb 9.6 oz)   SpO2 98%   BMI 22.42 kg/m    Exam:  GENERAL APPEARANCE:  Alert, in no distress, calm and pleasant   ENT:  Mouth and posterior oropharynx normal, moist mucous membranes, normal hearing acuity  EYES:  Conjunctiva and lids normal  RESP:  respiratory effort and palpation of chest normal, lungs clear to auscultation   CV:  Palpation and auscultation of heart done , regular rate and rhythm, no murmur, rub, or gallop, no edema  ABDOMEN:  no guarding or rebound  M/S:   without assistive device at baseline   SKIN:  Intact to visualized areas   NEURO:   Cranial nerves 2-12 are normal tested and grossly at patient's baseline  PSYCH:  insight and judgement impaired, memory impaired     Labs:   CBC RESULTS: Recent Labs   Lab Test 08/25/21  1458   WBC 5.9   RBC 4.50   HGB 14.7   HCT 43.7   MCV 97   MCH 32.7   MCHC 33.6   RDW 13.7          Last Basic Metabolic Panel:  Recent Labs   Lab Test 08/25/21  1458      POTASSIUM 4.2   CHLORIDE 106   NERIS 9.2   CO2 29   BUN 9   CR 0.89   *       Liver Function Studies -   Recent Labs   Lab Test 08/25/21  1458   PROTTOTAL 7.3   ALBUMIN 3.8   BILITOTAL 0.5   ALKPHOS 55   AST 34   ALT 40       TSH   Date Value Ref Range Status   08/25/2021 1.08 0.40 - 4.00 mU/L Final       No results found for: A1C      Recent Labs   Lab Test 08/25/21  1458   CHOL 240*   HDL 61   *   TRIG 671*     ASSESSMENT/PLAN:  (U07.1) COVID-19  (primary encounter diagnosis)  Comment: no lingering s/s  Plan:   -updated weight from facility   -encourage out to community areas with restrictions over    (E78.2) Mixed hyperlipidemia  Comment: previous labs   Plan:   -fasting lipid next week  -lipitor 20 mg po daily   -fenofibrate 145 mg po daily         Electronically signed " by:  JACKI Luther CNP                 Sincerely,        JACKI Luther CNP

## 2022-02-09 NOTE — LETTER
Orders for Marie Connolly    1. Updated weight for [place]  2. BMP/CBC and Lipid panel (fasting if able) next week on lab day. BMP/CBC HTN and Lipid panel for elevated triglycerides.      JACKI Luther CNP on 2/9/2022 at 2:19 PM

## 2022-02-11 RX ORDER — CHLORHEXIDINE GLUCONATE 4 %
1 LIQUID (ML) TOPICAL DAILY
Qty: 30 TABLET | Refills: 11 | Status: SHIPPED | OUTPATIENT
Start: 2022-02-11 | End: 2022-12-23

## 2022-02-14 NOTE — TELEPHONE ENCOUNTER
[facility] #[id number] seeking refill of fluoxetine 10mg    Patient now residing at [facility], all medications being filled by [facility]    Fax sent back to [facility] (R)

## 2022-02-16 ENCOUNTER — LAB REQUISITION (OUTPATIENT)
Dept: LAB | Facility: CLINIC | Age: 72
End: 2022-02-16
Payer: COMMERCIAL

## 2022-02-16 DIAGNOSIS — E78.5 HYPERLIPIDEMIA, UNSPECIFIED: ICD-10-CM

## 2022-02-16 DIAGNOSIS — I10 ESSENTIAL (PRIMARY) HYPERTENSION: ICD-10-CM

## 2022-02-17 DIAGNOSIS — E78.2 MIXED HYPERLIPIDEMIA: ICD-10-CM

## 2022-02-17 LAB
ANION GAP SERPL CALCULATED.3IONS-SCNC: 5 MMOL/L (ref 3–14)
BASOPHILS # BLD AUTO: 0.1 10E3/UL (ref 0–0.2)
BASOPHILS NFR BLD AUTO: 2 %
BUN SERPL-MCNC: 12 MG/DL (ref 7–30)
CALCIUM SERPL-MCNC: 9.6 MG/DL (ref 8.5–10.1)
CHLORIDE BLD-SCNC: 110 MMOL/L (ref 94–109)
CHOLEST SERPL-MCNC: 215 MG/DL
CO2 SERPL-SCNC: 26 MMOL/L (ref 20–32)
CREAT SERPL-MCNC: 0.93 MG/DL (ref 0.52–1.04)
EOSINOPHIL # BLD AUTO: 0.1 10E3/UL (ref 0–0.7)
EOSINOPHIL NFR BLD AUTO: 2 %
ERYTHROCYTE [DISTWIDTH] IN BLOOD BY AUTOMATED COUNT: 13.5 % (ref 10–15)
FASTING STATUS PATIENT QL REPORTED: ABNORMAL
GFR SERPL CREATININE-BSD FRML MDRD: 66 ML/MIN/1.73M2
GLUCOSE BLD-MCNC: 106 MG/DL (ref 70–99)
HCT VFR BLD AUTO: 45.3 % (ref 35–47)
HDLC SERPL-MCNC: 68 MG/DL
HGB BLD-MCNC: 14.1 G/DL (ref 11.7–15.7)
IMM GRANULOCYTES # BLD: 0 10E3/UL
IMM GRANULOCYTES NFR BLD: 0 %
LDLC SERPL CALC-MCNC: 123 MG/DL
LYMPHOCYTES # BLD AUTO: 1.8 10E3/UL (ref 0.8–5.3)
LYMPHOCYTES NFR BLD AUTO: 40 %
MCH RBC QN AUTO: 30.5 PG (ref 26.5–33)
MCHC RBC AUTO-ENTMCNC: 31.1 G/DL (ref 31.5–36.5)
MCV RBC AUTO: 98 FL (ref 78–100)
MONOCYTES # BLD AUTO: 0.3 10E3/UL (ref 0–1.3)
MONOCYTES NFR BLD AUTO: 7 %
NEUTROPHILS # BLD AUTO: 2.3 10E3/UL (ref 1.6–8.3)
NEUTROPHILS NFR BLD AUTO: 49 %
NONHDLC SERPL-MCNC: 147 MG/DL
NRBC # BLD AUTO: 0 10E3/UL
NRBC BLD AUTO-RTO: 0 /100
PLATELET # BLD AUTO: 262 10E3/UL (ref 150–450)
POTASSIUM BLD-SCNC: 3.6 MMOL/L (ref 3.4–5.3)
RBC # BLD AUTO: 4.63 10E6/UL (ref 3.8–5.2)
SODIUM SERPL-SCNC: 141 MMOL/L (ref 133–144)
TRIGL SERPL-MCNC: 119 MG/DL
WBC # BLD AUTO: 4.6 10E3/UL (ref 4–11)

## 2022-02-17 PROCEDURE — 80048 BASIC METABOLIC PNL TOTAL CA: CPT | Mod: ORL | Performed by: NURSE PRACTITIONER

## 2022-02-17 PROCEDURE — 36415 COLL VENOUS BLD VENIPUNCTURE: CPT | Mod: ORL | Performed by: NURSE PRACTITIONER

## 2022-02-17 PROCEDURE — 80061 LIPID PANEL: CPT | Mod: ORL | Performed by: NURSE PRACTITIONER

## 2022-02-17 PROCEDURE — 85025 COMPLETE CBC W/AUTO DIFF WBC: CPT | Mod: ORL | Performed by: NURSE PRACTITIONER

## 2022-02-17 RX ORDER — ATORVASTATIN CALCIUM 40 MG/1
40 TABLET, FILM COATED ORAL DAILY
Qty: 30 TABLET | Refills: 11 | Status: SHIPPED | OUTPATIENT
Start: 2022-02-17 | End: 2022-12-23

## 2022-02-17 NOTE — PROGRESS NOTES
Lipid panel back and cholesterol with some elevation at 215, LDL at 123 and non HDL elevated at 147. Will increase atorvastatin and follow.    Plan:  1. Discontinue current dose of atorvastatin and begin atorvastatin 40 mg po daily

## 2022-02-23 ENCOUNTER — TELEPHONE (OUTPATIENT)
Dept: GERIATRICS | Facility: CLINIC | Age: 72
End: 2022-02-23

## 2022-02-23 ENCOUNTER — ASSISTED LIVING VISIT (OUTPATIENT)
Dept: GERIATRICS | Facility: CLINIC | Age: 72
End: 2022-02-23
Payer: COMMERCIAL

## 2022-02-23 VITALS
SYSTOLIC BLOOD PRESSURE: 128 MMHG | DIASTOLIC BLOOD PRESSURE: 74 MMHG | TEMPERATURE: 97.2 F | OXYGEN SATURATION: 96 % | WEIGHT: 127.8 LBS | RESPIRATION RATE: 16 BRPM | BODY MASS INDEX: 21.82 KG/M2 | HEIGHT: 64 IN | HEART RATE: 66 BPM

## 2022-02-23 DIAGNOSIS — R46.89 AGGRESSIVE BEHAVIOR: ICD-10-CM

## 2022-02-23 DIAGNOSIS — Z12.31 ENCOUNTER FOR SCREENING MAMMOGRAM FOR BREAST CANCER: ICD-10-CM

## 2022-02-23 DIAGNOSIS — G30.9 ALZHEIMER'S DEMENTIA WITH BEHAVIORAL DISTURBANCE, UNSPECIFIED TIMING OF DEMENTIA ONSET: Primary | ICD-10-CM

## 2022-02-23 DIAGNOSIS — F02.81 ALZHEIMER'S DEMENTIA WITH BEHAVIORAL DISTURBANCE, UNSPECIFIED TIMING OF DEMENTIA ONSET: Primary | ICD-10-CM

## 2022-02-23 RX ORDER — DONEPEZIL HYDROCHLORIDE 10 MG/1
5 TABLET, FILM COATED ORAL DAILY
Qty: 30 TABLET | Refills: 11 | Status: SHIPPED | OUTPATIENT
Start: 2022-02-23 | End: 2022-05-04

## 2022-02-23 RX ORDER — QUETIAPINE FUMARATE 25 MG/1
TABLET, FILM COATED ORAL
Qty: 30 TABLET | Refills: 11 | Status: SHIPPED | OUTPATIENT
Start: 2022-02-23 | End: 2023-03-13

## 2022-02-23 NOTE — NURSING NOTE
"[facility]  [place] Medical Record Number:  [id number]  Place of Service where encounter took place:  [facility] [[id number]]      Chief Complaint   Patient presents with     RECHECK         HPI:    Marie Connolly  is a 71 year old (1950), who is being seen today for an episodic care visit.  HPI information obtained from: facility chart records, facility staff, patient report and [facility] chart review. Today's concern is:    Resident seen today for reports of increased behaviors by staff.      Past Medical and Surgical History reviewed in Epic today.     MEDICATIONS:    Current Outpatient Prescriptions          Current Outpatient Medications   Medication Sig Dispense Refill     donepezil (ARICEPT) 10 MG tablet Take 0.5 tablets (5 mg) by mouth daily 30 tablet 11     QUEtiapine (SEROQUEL) 25 MG tablet Take 0.5 tablets (12.5 mg) by mouth daily at 2 pm. May also take 0.5 tablets (12.5 mg) daily as needed (Agitation). 30 tablet 11     amLODIPine (NORVASC) 2.5 MG tablet Take 1 tablet (2.5 mg) by mouth daily 30 tablet 11     atorvastatin (LIPITOR) 40 MG tablet Take 1 tablet (40 mg) by mouth daily 30 tablet 11     fenofibrate (TRICOR) 145 MG tablet Take 1 tablet (145 mg) by mouth daily 30 tablet 11     FLUoxetine (PROZAC) 10 MG capsule Take 1 capsule (10 mg) by mouth daily 30 capsule 11     memantine (NAMENDA) 10 MG tablet Take 1 tablet (10 mg) by mouth 2 times daily 60 tablet 11     Multiple Vitamins-Minerals (WOMENS MULTIVITAMIN) TABS Take 1 tablet by mouth daily 30 tablet 11     vitamin D3 (CHOLECALCIFEROL) 50 mcg (2000 units) tablet Take 1 tablet (50 mcg) by mouth daily 30 tablet 11            REVIEW OF SYSTEMS:  4 point ROS including Respiratory, CV, GI and , other than that noted in the HPI,  is negative     Objective:  Temp 97.2  F (36.2  C)   Ht 1.626 m (5' 4\")   Wt 58 kg (127 lb 12.8 oz)   SpO2 96%   BMI 21.94 kg/m    Exam:  GENERAL APPEARANCE:  " Alert, in no distress, appears healthy  ENT:  Mouth and posterior oropharynx normal, moist mucous membranes  RESP:  respiratory effort and palpation of chest normal, lungs clear to auscultation   CV:  Palpation and auscultation of heart done , regular rate and rhythm, no murmur, rub, or gallop, no edema  ABDOMEN:  normal bowel sounds, soft, nontender, no hepatosplenomegaly or other masses  M/S:   Gait and station normal  SKIN:  Inspection of skin and subcutaneous tissue baseline  NEURO:   Cranial nerves 2-12 are normal tested and grossly at patient's baseline  PSYCH:  insight and judgement impaired, memory impaired      Labs:   Recent Results (from the past 720 hour(s))   COVID-19 Virus (Coronavirus) by PCR Nasopharyngeal    Collection Time: 01/27/22  2:00 AM    Specimen: Nasopharyngeal; Swab   Result Value Ref Range    SARS CoV2 PCR Positive (A) Negative, Testing sent to reference lab. Results will be returned via unsolicited result   Basic metabolic panel    Collection Time: 02/17/22  7:51 AM   Result Value Ref Range    Sodium 141 133 - 144 mmol/L    Potassium 3.6 3.4 - 5.3 mmol/L    Chloride 110 (H) 94 - 109 mmol/L    Carbon Dioxide (CO2) 26 20 - 32 mmol/L    Anion Gap 5 3 - 14 mmol/L    Urea Nitrogen 12 7 - 30 mg/dL    Creatinine 0.93 0.52 - 1.04 mg/dL    Calcium 9.6 8.5 - 10.1 mg/dL    Glucose 106 (H) 70 - 99 mg/dL    GFR Estimate 66 >60 mL/min/1.73m2   Lipid Profile    Collection Time: 02/17/22  7:51 AM   Result Value Ref Range    Cholesterol 215 (H) <200 mg/dL    Triglycerides 119 <150 mg/dL    Direct Measure HDL 68 >=50 mg/dL    LDL Cholesterol Calculated 123 (H) <=100 mg/dL    Non HDL Cholesterol 147 (H) <130 mg/dL    Patient Fasting > 8hrs? Unknown    CBC with platelets and differential    Collection Time: 02/17/22  7:51 AM   Result Value Ref Range    WBC Count 4.6 4.0 - 11.0 10e3/uL    RBC Count 4.63 3.80 - 5.20 10e6/uL    Hemoglobin 14.1 11.7 - 15.7 g/dL    Hematocrit 45.3 35.0 - 47.0 %    MCV 98 78 -  100 fL    MCH 30.5 26.5 - 33.0 pg    MCHC 31.1 (L) 31.5 - 36.5 g/dL    RDW 13.5 10.0 - 15.0 %    Platelet Count 262 150 - 450 10e3/uL    % Neutrophils 49 %    % Lymphocytes 40 %    % Monocytes 7 %    % Eosinophils 2 %    % Basophils 2 %    % Immature Granulocytes 0 %    NRBCs per 100 WBC 0 <1 /100    Absolute Neutrophils 2.3 1.6 - 8.3 10e3/uL    Absolute Lymphocytes 1.8 0.8 - 5.3 10e3/uL    Absolute Monocytes 0.3 0.0 - 1.3 10e3/uL    Absolute Eosinophils 0.1 0.0 - 0.7 10e3/uL    Absolute Basophils 0.1 0.0 - 0.2 10e3/uL    Absolute Immature Granulocytes 0.0 <=0.4 10e3/uL    Absolute NRBCs 0.0 10e3/uL        ASSESSMENT/PLAN:    (G30.9) Alzheimer's dementia with behavioral disturbance, unspecified timing of dementia onset  (R46.89) Aggressive Behavior   - reduce Donepezil from 10 mg to 5 mg PO daily  - begin Seroquel 12.5 mg PO daily at 2 pm and 12.5 mg PO daily PRN  - continue Memantine 10 mg PO BID    (Z12.31) Encounter for screening mammogram for breast cancer  - screening ordered per family request     Janeen Meadows, RN, NP Student on 2/23/2022 at 5:44 PM

## 2022-02-23 NOTE — LETTER
2/23/2022        RE: Marie Connolly  [address]  [address]  [place] [address]        [facility]  [place] Medical Record Number:  [id number]  Place of Service where encounter took place:  [facility] [[id number]]  Chief Complaint   Patient presents with     RECHECK       HPI:    Marie Connolly  is a 71 year old (1950), who is being seen today for an episodic care visit.  HPI information obtained from: facility staff. Today's concern is:    Increased agitation and sundowning behaviors including physical aggression towards another resident per staff notes. Most episodes happen in the later afternoon. She is calm and pleasant today, agreeable for exam and with no concerns.    Past Medical and Surgical History reviewed in Epic today.    MEDICATIONS:    Current Outpatient Medications   Medication Sig Dispense Refill     donepezil (ARICEPT) 10 MG tablet Take 0.5 tablets (5 mg) by mouth daily 30 tablet 11     QUEtiapine (SEROQUEL) 25 MG tablet Take 0.5 tablets (12.5 mg) by mouth daily at 2 pm. May also take 0.5 tablets (12.5 mg) daily as needed (Agitation). 30 tablet 11     amLODIPine (NORVASC) 2.5 MG tablet Take 1 tablet (2.5 mg) by mouth daily 30 tablet 11     atorvastatin (LIPITOR) 40 MG tablet Take 1 tablet (40 mg) by mouth daily 30 tablet 11     fenofibrate (TRICOR) 145 MG tablet Take 1 tablet (145 mg) by mouth daily 30 tablet 11     FLUoxetine (PROZAC) 10 MG capsule Take 1 capsule (10 mg) by mouth daily 30 capsule 11     memantine (NAMENDA) 10 MG tablet Take 1 tablet (10 mg) by mouth 2 times daily 60 tablet 11     Multiple Vitamins-Minerals (WOMENS MULTIVITAMIN) TABS Take 1 tablet by mouth daily 30 tablet 11     vitamin D3 (CHOLECALCIFEROL) 50 mcg (2000 units) tablet Take 1 tablet (50 mcg) by mouth daily 30 tablet 11     REVIEW OF SYSTEMS:  Unobtainable secondary to cognitive impairment.     Objective:  /74   Pulse 66   Temp 97.2  F (36.2  C)   Resp  "16   Ht 1.626 m (5' 4\")   Wt 58 kg (127 lb 12.8 oz)   SpO2 96%   BMI 21.94 kg/m    Exam:  GENERAL APPEARANCE:  Alert, calm and pleasant   ENT:  Mouth and posterior oropharynx normal, moist mucous membranes, normal hearing acuity  EYES:  Conjunctiva and lids normal  RESP:  respiratory effort and palpation of chest normal, lungs clear to auscultation   CV:  Palpation and auscultation of heart done , regular rate and rhythm, no murmur, rub, or gallop, no edema  ABDOMEN:  no guarding or rebound  M/S: ambulates without assistive device at baseline   SKIN:  Intact to visualized areas-limited exam    NEURO:   Cranial nerves 2-12 are normal tested and grossly at patient's baseline  PSYCH:  insight and judgement impaired, memory impaired     Labs:   CBC RESULTS: Recent Labs   Lab Test 02/17/22  0751 08/25/21  1458   WBC 4.6 5.9   RBC 4.63 4.50   HGB 14.1 14.7   HCT 45.3 43.7   MCV 98 97   MCH 30.5 32.7   MCHC 31.1* 33.6   RDW 13.5 13.7    199       Last Basic Metabolic Panel:  Recent Labs   Lab Test 02/17/22  0751 08/25/21  1458    140   POTASSIUM 3.6 4.2   CHLORIDE 110* 106   NERIS 9.6 9.2   CO2 26 29   BUN 12 9   CR 0.93 0.89   * 101*       Liver Function Studies -   Recent Labs   Lab Test 08/25/21  1458   PROTTOTAL 7.3   ALBUMIN 3.8   BILITOTAL 0.5   ALKPHOS 55   AST 34   ALT 40       TSH   Date Value Ref Range Status   08/25/2021 1.08 0.40 - 4.00 mU/L Final       No results found for: A1C    ASSESSMENT/PLAN:  (G30.9, F02.81) Alzheimer's dementia with behavioral disturbance, unspecified timing of dementia onset (H)  (R46.89) Aggressive behaviors   Comment: newer to  with physical abilities above many others on unit  Plan:   -staff assist as needed with ADLs  -memantine 10 mg po BID  -donepezil 10 mg po daily will reduce today to 5 mg po daily  -QUEtiapine (SEROQUEL) 25 MG tablet Take 0.5 tablets (12.5 mg) by mouth daily at 2 pm. May also take 0.5 tablets (12.5 mg) daily as needed (Agitation). "     (Z12.31) Encounter for screening mammogram for breast cancer  Comment: per family request would like to schedule  Plan:   -MA Screening Digital Bilateral ordered today                Electronically signed by:  JACKI Luther CNP                 Sincerely,        JACKI Luther CNP

## 2022-02-23 NOTE — LETTER
New orders for Marie Connolly  1. Discontinue current dose or Aricept and begin   donepezil (ARICEPT) 10 MG tablet Take 0.5 tablets (5 mg) by mouth daily   2.  QUEtiapine (SEROQUEL) 25 MG tablet Take 0.5 tablets (12.5 mg) by mouth daily at 2 pm. May also take 0.5 tablets (12.5 mg) daily as needed (Agitation       Electronically signed by:  JACKI Luther CNP

## 2022-02-23 NOTE — PROGRESS NOTES
"[facility]  [place] Medical Record Number:  [id number]  Place of Service where encounter took place:  [facility] [[id number]]  Chief Complaint   Patient presents with     RECHECK       HPI:    Marie Connolly  is a 71 year old (1950), who is being seen today for an episodic care visit.  HPI information obtained from: facility staff. Today's concern is:    Increased agitation and sundowning behaviors including physical aggression towards another resident per staff notes. Most episodes happen in the later afternoon. She is calm and pleasant today, agreeable for exam and with no concerns.    Past Medical and Surgical History reviewed in Epic today.    MEDICATIONS:    Current Outpatient Medications   Medication Sig Dispense Refill     donepezil (ARICEPT) 10 MG tablet Take 0.5 tablets (5 mg) by mouth daily 30 tablet 11     QUEtiapine (SEROQUEL) 25 MG tablet Take 0.5 tablets (12.5 mg) by mouth daily at 2 pm. May also take 0.5 tablets (12.5 mg) daily as needed (Agitation). 30 tablet 11     amLODIPine (NORVASC) 2.5 MG tablet Take 1 tablet (2.5 mg) by mouth daily 30 tablet 11     atorvastatin (LIPITOR) 40 MG tablet Take 1 tablet (40 mg) by mouth daily 30 tablet 11     fenofibrate (TRICOR) 145 MG tablet Take 1 tablet (145 mg) by mouth daily 30 tablet 11     FLUoxetine (PROZAC) 10 MG capsule Take 1 capsule (10 mg) by mouth daily 30 capsule 11     memantine (NAMENDA) 10 MG tablet Take 1 tablet (10 mg) by mouth 2 times daily 60 tablet 11     Multiple Vitamins-Minerals (WOMENS MULTIVITAMIN) TABS Take 1 tablet by mouth daily 30 tablet 11     vitamin D3 (CHOLECALCIFEROL) 50 mcg (2000 units) tablet Take 1 tablet (50 mcg) by mouth daily 30 tablet 11     REVIEW OF SYSTEMS:  Unobtainable secondary to cognitive impairment.     Objective:  /74   Pulse 66   Temp 97.2  F (36.2  C)   Resp 16   Ht 1.626 m (5' 4\")   Wt 58 kg (127 lb 12.8 oz)   SpO2 96%   BMI 21.94 kg/m  "   Exam:  GENERAL APPEARANCE:  Alert, calm and pleasant   ENT:  Mouth and posterior oropharynx normal, moist mucous membranes, normal hearing acuity  EYES:  Conjunctiva and lids normal  RESP:  respiratory effort and palpation of chest normal, lungs clear to auscultation   CV:  Palpation and auscultation of heart done , regular rate and rhythm, no murmur, rub, or gallop, no edema  ABDOMEN:  no guarding or rebound  M/S: ambulates without assistive device at baseline   SKIN:  Intact to visualized areas-limited exam    NEURO:   Cranial nerves 2-12 are normal tested and grossly at patient's baseline  PSYCH:  insight and judgement impaired, memory impaired     Labs:   CBC RESULTS: Recent Labs   Lab Test 02/17/22  0751 08/25/21  1458   WBC 4.6 5.9   RBC 4.63 4.50   HGB 14.1 14.7   HCT 45.3 43.7   MCV 98 97   MCH 30.5 32.7   MCHC 31.1* 33.6   RDW 13.5 13.7    199       Last Basic Metabolic Panel:  Recent Labs   Lab Test 02/17/22  0751 08/25/21  1458    140   POTASSIUM 3.6 4.2   CHLORIDE 110* 106   NERIS 9.6 9.2   CO2 26 29   BUN 12 9   CR 0.93 0.89   * 101*       Liver Function Studies -   Recent Labs   Lab Test 08/25/21  1458   PROTTOTAL 7.3   ALBUMIN 3.8   BILITOTAL 0.5   ALKPHOS 55   AST 34   ALT 40       TSH   Date Value Ref Range Status   08/25/2021 1.08 0.40 - 4.00 mU/L Final       No results found for: A1C    ASSESSMENT/PLAN:  (G30.9, F02.81) Alzheimer's dementia with behavioral disturbance, unspecified timing of dementia onset (H)  (R46.89) Aggressive behaviors   Comment: newer to  with physical abilities above many others on unit  Plan:   -staff assist as needed with ADLs  -memantine 10 mg po BID  -donepezil 10 mg po daily will reduce today to 5 mg po daily  -QUEtiapine (SEROQUEL) 25 MG tablet Take 0.5 tablets (12.5 mg) by mouth daily at 2 pm. May also take 0.5 tablets (12.5 mg) daily as needed (Agitation).     (Z12.31) Encounter for screening mammogram for breast cancer  Comment: per family  request would like to schedule  Plan:   -MA Screening Digital Bilateral ordered today                Electronically signed by:  JACKI Luther CNP

## 2022-02-24 NOTE — TELEPHONE ENCOUNTER
Prior Authorization Retail Medication Request    Medication/Dose: QUETIAPINE   TAB 25MG  ICD code (if different than what is on RX):  (G30.9, F02.81) Alzheimer's dementia with behavioral disturbance, unspecified timing of dementia onset (H)  (R46.89) Aggressive behaviors     Previously Tried and Failed:  N/A  Rationale: newer to Memory Care Unit with physical abilities above many others on unit      Insurance Name:  BCBS Medicare Advantage  Insurance ID:  [id number]      Pharmacy Information (if different than what is on RX)  Name:  [facility]  Phone:  [phone]

## 2022-02-25 NOTE — TELEPHONE ENCOUNTER
PA Initiation    Medication: QUETIAPINE   TAB 25MG  Insurance Company: [employer] - Phone [phone] Fax [phone]  Pharmacy Filling the Rx: [facility] - [address]  Filling Pharmacy Phone: [phone]  Filling Pharmacy Fax:    Start Date: 2/25/2022    Central Prior Authorization Team   Phone: [phone]

## 2022-03-01 NOTE — TELEPHONE ENCOUNTER
Prior Authorization Approval    Authorization Effective Date: 1/1/2022  Authorization Expiration Date: 2/25/2023  Medication: QUETIAPINE   TAB 25MG  Approved Dose/Quantity: 30  Reference #:     Insurance Company: [employer] - Phone [phone] Fax [phone]  Which Pharmacy is filling the prescription (Not needed for infusion/clinic administered): [facility] - [address]  Pharmacy Notified: Yes

## 2022-03-02 ENCOUNTER — TELEPHONE (OUTPATIENT)
Dept: FAMILY MEDICINE | Facility: CLINIC | Age: 72
End: 2022-03-02
Payer: COMMERCIAL

## 2022-03-09 NOTE — PROGRESS NOTES
Discharge Note -Hand Therapy    Initial Evaluation Date:  11/1/2021  Current Date: 3/9/2022  Number of Visits: 1    S:    Pt did not follow up for scheduled visits    O:  Objective information is not available as pt has not returned for therapy.  Last visit or last objective information will serve as final entry.      A: Pt did not return for further treatment.    Status of goals is unknown due to lack of followup of patient.      P:  Discharge from Hand Therapy.

## 2022-03-24 ENCOUNTER — HOSPITAL ENCOUNTER (OUTPATIENT)
Dept: MAMMOGRAPHY | Facility: CLINIC | Age: 72
Discharge: HOME | End: 2022-03-24
Attending: NURSE PRACTITIONER | Admitting: NURSE PRACTITIONER
Payer: COMMERCIAL

## 2022-03-24 DIAGNOSIS — Z12.31 ENCOUNTER FOR SCREENING MAMMOGRAM FOR BREAST CANCER: ICD-10-CM

## 2022-03-24 PROCEDURE — 77067 SCR MAMMO BI INCL CAD: CPT

## 2022-04-03 ENCOUNTER — OFFICE VISIT (OUTPATIENT)
Dept: URGENT CARE | Facility: URGENT CARE | Age: 72
End: 2022-04-03
Payer: COMMERCIAL

## 2022-04-03 ENCOUNTER — ANCILLARY PROCEDURE (OUTPATIENT)
Dept: GENERAL RADIOLOGY | Facility: CLINIC | Age: 72
End: 2022-04-03
Attending: NURSE PRACTITIONER
Payer: COMMERCIAL

## 2022-04-03 VITALS
TEMPERATURE: 98.1 F | WEIGHT: 127 LBS | RESPIRATION RATE: 20 BRPM | OXYGEN SATURATION: 98 % | DIASTOLIC BLOOD PRESSURE: 77 MMHG | HEART RATE: 61 BPM | BODY MASS INDEX: 21.8 KG/M2 | SYSTOLIC BLOOD PRESSURE: 124 MMHG

## 2022-04-03 DIAGNOSIS — M25.572 PAIN IN JOINT INVOLVING ANKLE AND FOOT, LEFT: Primary | ICD-10-CM

## 2022-04-03 DIAGNOSIS — M25.572 PAIN IN JOINT INVOLVING ANKLE AND FOOT, LEFT: ICD-10-CM

## 2022-04-03 PROCEDURE — 73630 X-RAY EXAM OF FOOT: CPT | Mod: LT | Performed by: RADIOLOGY

## 2022-04-03 PROCEDURE — 73610 X-RAY EXAM OF ANKLE: CPT | Mod: LT | Performed by: RADIOLOGY

## 2022-04-03 PROCEDURE — 99213 OFFICE O/P EST LOW 20 MIN: CPT | Performed by: NURSE PRACTITIONER

## 2022-04-03 NOTE — PROGRESS NOTES
Assessment & Plan     Pain in joint involving ankle and foot, left  Xray interpreted as negative in the clinic.  Pending radiologist review.    Offered and pt declined pain medications  Recommend RICE.    F/u if persists or worsens.    - XR Foot Left G/E 3 Views  - XR Ankle Left G/E 3 Views       Return in about 2 days (around 4/5/2022) for with regular provider if symptoms persist.    [facility]  [facility] DB Salazar is a 71 year old female who presents to clinic today for the following health issues:  Chief Complaint   Patient presents with     Musculoskeletal Problem     Left foot and ankle pain since 4/1/2022 when a shower  fell on areas     HPI    MS Injury/Pain    Onset of symptoms was 2 day(s) ago.  Location: left ankle and foot  Context:       The injury happened while at home      Mechanism: shower  fell on it.        Patient experienced immediate pain, delayed swelling, was able to bear weight directly after injury, no deformity was noted by the patient  Course of symptoms is same.    Severity moderate  Current and Associated symptoms: Pain  Denies  Swelling, Bruising, Warmth and Redness  Aggravating Factors: walking and weight-bearing  Therapies to improve symptoms include: ice  This is the first time this type of problem has occurred for this patient.       Review of Systems  Constitutional, HEENT, cardiovascular, pulmonary, GI, , musculoskeletal, neuro, skin, endocrine and psych systems are negative, except as otherwise noted.      Objective    /77   Pulse 61   Temp 98.1  F (36.7  C)   Resp 20   Wt 57.6 kg (127 lb)   SpO2 98%   BMI 21.80 kg/m    Physical Exam   GENERAL: healthy, alert and no distress  RESP: lungs clear to auscultation - no rales, rhonchi or wheezes  CV: regular rate and rhythm, normal S1 S2, no S3 or S4, no murmur, click or rub, no peripheral edema and peripheral pulses strong  MS: normal muscle tone,  normal range of motion, no edema, peripheral pulses normal and tenderness to palpation over medial malleolus and MTP of the 1st digit.    SKIN: no suspicious lesions or rashes

## 2022-04-09 ENCOUNTER — HEALTH MAINTENANCE LETTER (OUTPATIENT)
Age: 72
End: 2022-04-09

## 2022-04-11 ENCOUNTER — OFFICE VISIT (OUTPATIENT)
Dept: GERIATRICS | Facility: CLINIC | Age: 72
End: 2022-04-11
Payer: COMMERCIAL

## 2022-04-11 VITALS
HEART RATE: 66 BPM | TEMPERATURE: 97.3 F | WEIGHT: 130.6 LBS | OXYGEN SATURATION: 95 % | RESPIRATION RATE: 17 BRPM | DIASTOLIC BLOOD PRESSURE: 84 MMHG | HEIGHT: 64 IN | BODY MASS INDEX: 22.3 KG/M2 | SYSTOLIC BLOOD PRESSURE: 129 MMHG

## 2022-04-11 DIAGNOSIS — Z01.818 PRE-OP EXAM: Primary | ICD-10-CM

## 2022-04-11 NOTE — LETTER
4/11/2022        RE: Marie Connolly  [facility]  [address]  [address]  [facility] [address]        [facility] G[facility]ATR[facility]  [address] [place] [address]  Phone: [phone]  Fax: [phone]  Primary Provider: Renata Carr  Pre-op Performing Provider: RENATA CARR      :[id number]}  PREOPERATIVE EVALUATION:  Today's date: 4/11/2022    Marie Connolly is a 71 year old female who presents for a preoperative evaluation.    Surgical Information:  Surgery/Procedure: right cataract removal   Surgery Location: [facility]   Surgeon: Dr. Patrick Riedel   Surgery Date: 5/6/2022  Time of Surgery: TBA  Where patient plans to recover: Other: Assisted Living-memory care  Fax number for surgical facility: [phone]    Type of Anesthesia Anticipated: to be determined    Assessment & Plan     The proposed surgical procedure is considered LOW risk. For precautions will have her NPO after midnight and hold her morning medications only which can be resumed the following day. Additional drops and post-op orders from [facility].    Resident with advanced dementia and unable to complete pre-op questionnaire. PMH reviewed in chart includes dementia, hypertension, hyperlipidemia, depression, enlarged pituitary gland. No known risk factors for procedure. She is not on  anticoagulation.     Risks and Recommendations:  The patient has the following additional risks and recommendations for perioperative complications:   - No identified additional risk factors other than previously addressed    Medication Instructions:  Hold morning medications    RECOMMENDATION:  APPROVAL GIVEN to proceed with proposed procedure, without further diagnostic evaluation.    Review of notes as documented above     Subjective     HPI related to upcoming procedure: Resident to undergo cataract removal of right eye. She had left cataract surgery 5/25/2021 which was uneventful.    [facility]  Directive:  Patient does not have a Health Care Directive or Living Will: Full Code     Preoperative Review of :   reviewed - no record of controlled substances prescribed.  [id number]}    Status of Chronic Conditions:  See problem list for active medical problems.  Problems all longstanding and stable, except as noted/documented.  See ROS for pertinent symptoms related to these conditions.      Review of Systems  CONSTITUTIONAL: NEGATIVE for fever, chills, change in weight  ENT/MOUTH: NEGATIVE for ear, mouth and throat problems  RESP: NEGATIVE for significant cough or SOB  CV: NEGATIVE for chest pain, palpitations or peripheral edema    Patient Active Problem List    Diagnosis Date Noted     Pure hypercholesterolemia 12/27/2021     Priority: Medium     Other long term (current) drug therapy 12/27/2021     Priority: Medium     Primary open angle glaucoma of both eyes, mild stage 12/27/2021     Priority: Medium     Unspecified age-related cataract 12/27/2021     Priority: Medium     Vitamin D deficiency 12/27/2021     Priority: Medium     Right wrist pain 11/01/2021     Priority: Medium     Essential hypertension 05/21/2021     Priority: Medium     Other recurrent depressive disorders (H) 05/21/2021     Priority: Medium     Dementia in Alzheimer's disease (H) 11/30/2017     Priority: Medium      Past Medical History:   Diagnosis Date     Dementia in Alzheimer's disease (H) 11/30/2017     Essential hypertension 5/21/2021     Other disorders of pituitary gland (H) 12/27/2021     Other long term (current) drug therapy 12/27/2021     Other recurrent depressive disorders (H) 5/21/2021     Primary open angle glaucoma of both eyes, mild stage 12/27/2021     Pure hypercholesterolemia 12/27/2021     Right wrist pain 11/1/2021     Unspecified age-related cataract 12/27/2021     Vitamin D deficiency 12/27/2021     Past Surgical History:   Procedure Laterality Date     CATARACT REMOVAL  05/21/2021     Current Outpatient  "Medications   Medication Sig Dispense Refill     amLODIPine (NORVASC) 2.5 MG tablet Take 1 tablet (2.5 mg) by mouth daily 30 tablet 11     atorvastatin (LIPITOR) 40 MG tablet Take 1 tablet (40 mg) by mouth daily 30 tablet 11     donepezil (ARICEPT) 10 MG tablet Take 0.5 tablets (5 mg) by mouth daily 30 tablet 11     fenofibrate (TRICOR) 145 MG tablet Take 1 tablet (145 mg) by mouth daily 30 tablet 11     FLUoxetine (PROZAC) 10 MG capsule Take 1 capsule (10 mg) by mouth daily 30 capsule 11     memantine (NAMENDA) 10 MG tablet Take 1 tablet (10 mg) by mouth 2 times daily 60 tablet 11     Multiple Vitamins-Minerals (WOMENS MULTIVITAMIN) TABS Take 1 tablet by mouth daily 30 tablet 11     QUEtiapine (SEROQUEL) 25 MG tablet Take 0.5 tablets (12.5 mg) by mouth daily at 2 pm. May also take 0.5 tablets (12.5 mg) daily as needed (Agitation). 30 tablet 11     vitamin D3 (CHOLECALCIFEROL) 50 mcg (2000 units) tablet Take 1 tablet (50 mcg) by mouth daily 30 tablet 11       No Known Allergies     Social History     Tobacco Use     Smoking status: Never Smoker     Smokeless tobacco: Never Used   Substance Use Topics     Alcohol use: Yes     Alcohol/week: 2.0 - 3.0 standard drinks     Types: 2 - 3 Standard drinks or equivalent per week     Comment: special occasions       Objective     /84   Pulse 66   Temp 97.3  F (36.3  C)   Resp 17   Ht 1.626 m (5' 4\")   Wt 59.2 kg (130 lb 9.6 oz)   SpO2 95%   BMI 22.42 kg/m      Physical Exam  GENERAL APPEARANCE:  Alert, in no distress, appears healthy  ENT:  Mouth and posterior oropharynx normal, moist mucous membranes, no eye drainage, redness  RESP:  respiratory effort and palpation of chest normal, lungs clear to auscultation   CV:  Palpation and auscultation of heart done , regular rate and rhythm, no murmur, rub, or gallop, no edema  ABDOMEN:  normal bowel sounds, soft, nontender  M/S:   Gait and station normal-no assistive device for ambulation   SKIN:  Inspection of skin and " subcutaneous tissue baseline  NEURO:   Cranial nerves 2-12 are normal tested and grossly at patient's baseline  PSYCH:  insight and judgement impaired, memory impaired     CBC RESULTS: Recent Labs   Lab Test 02/17/22  0751 08/25/21  1458   WBC 4.6 5.9   RBC 4.63 4.50   HGB 14.1 14.7   HCT 45.3 43.7   MCV 98 97   MCH 30.5 32.7   MCHC 31.1* 33.6   RDW 13.5 13.7    199       Last Basic Metabolic Panel:  Recent Labs   Lab Test 02/17/22  0751 08/25/21  1458    140   POTASSIUM 3.6 4.2   CHLORIDE 110* 106   NERIS 9.6 9.2   CO2 26 29   BUN 12 9   CR 0.93 0.89   * 101*       Liver Function Studies -   Recent Labs   Lab Test 08/25/21  1458   PROTTOTAL 7.3   ALBUMIN 3.8   BILITOTAL 0.5   ALKPHOS 55   AST 34   ALT 40       TSH   Date Value Ref Range Status   08/25/2021 1.08 0.40 - 4.00 mU/L Final       No results found for: A1C    Diagnostics:  No labs were ordered during this visit.   No EKG required for low risk surgery (cataract, skin procedure, breast biopsy, etc).    Revised Cardiac Risk Index (RCRI):  The patient has the following serious cardiovascular risks for perioperative complications:   - No serious cardiac risks = 0 points     RCRI Interpretation: 0 points: Class I (very low risk - 0.4% complication rate)           Signed Electronically by: JACKI Luther CNP  Copy of this evaluation report is provided to requesting physician.            Sincerely,        JACKI Luther CNP

## 2022-04-11 NOTE — PROGRESS NOTES
[facility] GERIATRICS  [address] [place] [address]  Phone: [phone]  Fax: [phone]  Primary Provider: Armaan Carr  Pre-op Performing Provider: ARMAAN CARR      :[id number]}  PREOPERATIVE EVALUATION:  Today's date: 4/11/2022    Marie Connolly is a 71 year old female who presents for a preoperative evaluation.    Surgical Information:  Surgery/Procedure: right cataract removal   Surgery Location: [facility]   Surgeon: Dr. Patrick Riedel   Surgery Date: 5/6/2022  Time of Surgery: TBA  Where patient plans to recover: Other: Assisted Living-memory care  Fax number for surgical facility: [phone]    Type of Anesthesia Anticipated: to be determined    Assessment & Plan     The proposed surgical procedure is considered LOW risk. For precautions will have her NPO after midnight and hold her morning medications only which can be resumed the following day. Additional drops and post-op orders from [facility].    Resident with advanced dementia and unable to complete pre-op questionnaire. PMH reviewed in chart includes dementia, hypertension, hyperlipidemia, depression, enlarged pituitary gland. No known risk factors for procedure. She is not on  anticoagulation.     Risks and Recommendations:  The patient has the following additional risks and recommendations for perioperative complications:   - No identified additional risk factors other than previously addressed    Medication Instructions:  Hold morning medications    RECOMMENDATION:  APPROVAL GIVEN to proceed with proposed procedure, without further diagnostic evaluation.    Review of notes as documented above     Subjective     HPI related to upcoming procedure: Resident to undergo cataract removal of right eye. She had left cataract surgery 5/25/2021 which was uneventful.    Health Care Directive:  Patient does not have a Health Care Directive or Living Will: Full Code     Preoperative Review of :   reviewed -  no record of controlled substances prescribed.  [id number]}    Status of Chronic Conditions:  See problem list for active medical problems.  Problems all longstanding and stable, except as noted/documented.  See ROS for pertinent symptoms related to these conditions.      Review of Systems  CONSTITUTIONAL: NEGATIVE for fever, chills, change in weight  ENT/MOUTH: NEGATIVE for ear, mouth and throat problems  RESP: NEGATIVE for significant cough or SOB  CV: NEGATIVE for chest pain, palpitations or peripheral edema    Patient Active Problem List    Diagnosis Date Noted     Pure hypercholesterolemia 12/27/2021     Priority: Medium     Other long term (current) drug therapy 12/27/2021     Priority: Medium     Primary open angle glaucoma of both eyes, mild stage 12/27/2021     Priority: Medium     Unspecified age-related cataract 12/27/2021     Priority: Medium     Vitamin D deficiency 12/27/2021     Priority: Medium     Right wrist pain 11/01/2021     Priority: Medium     Essential hypertension 05/21/2021     Priority: Medium     Other recurrent depressive disorders (H) 05/21/2021     Priority: Medium     Dementia in Alzheimer's disease (H) 11/30/2017     Priority: Medium      Past Medical History:   Diagnosis Date     Dementia in Alzheimer's disease (H) 11/30/2017     Essential hypertension 5/21/2021     Other disorders of pituitary gland (H) 12/27/2021     Other long term (current) drug therapy 12/27/2021     Other recurrent depressive disorders (H) 5/21/2021     Primary open angle glaucoma of both eyes, mild stage 12/27/2021     Pure hypercholesterolemia 12/27/2021     Right wrist pain 11/1/2021     Unspecified age-related cataract 12/27/2021     Vitamin D deficiency 12/27/2021     Past Surgical History:   Procedure Laterality Date     CATARACT REMOVAL  05/21/2021     Current Outpatient Medications   Medication Sig Dispense Refill     amLODIPine (NORVASC) 2.5 MG tablet Take 1 tablet (2.5 mg) by mouth daily 30 tablet 11      "atorvastatin (LIPITOR) 40 MG tablet Take 1 tablet (40 mg) by mouth daily 30 tablet 11     donepezil (ARICEPT) 10 MG tablet Take 0.5 tablets (5 mg) by mouth daily 30 tablet 11     fenofibrate (TRICOR) 145 MG tablet Take 1 tablet (145 mg) by mouth daily 30 tablet 11     FLUoxetine (PROZAC) 10 MG capsule Take 1 capsule (10 mg) by mouth daily 30 capsule 11     memantine (NAMENDA) 10 MG tablet Take 1 tablet (10 mg) by mouth 2 times daily 60 tablet 11     Multiple Vitamins-Minerals (WOMENS MULTIVITAMIN) TABS Take 1 tablet by mouth daily 30 tablet 11     QUEtiapine (SEROQUEL) 25 MG tablet Take 0.5 tablets (12.5 mg) by mouth daily at 2 pm. May also take 0.5 tablets (12.5 mg) daily as needed (Agitation). 30 tablet 11     vitamin D3 (CHOLECALCIFEROL) 50 mcg (2000 units) tablet Take 1 tablet (50 mcg) by mouth daily 30 tablet 11       No Known Allergies     Social History     Tobacco Use     Smoking status: Never Smoker     Smokeless tobacco: Never Used   Substance Use Topics     Alcohol use: Yes     Alcohol/week: 2.0 - 3.0 standard drinks     Types: 2 - 3 Standard drinks or equivalent per week     Comment: special occasions       Objective     /84   Pulse 66   Temp 97.3  F (36.3  C)   Resp 17   Ht 1.626 m (5' 4\")   Wt 59.2 kg (130 lb 9.6 oz)   SpO2 95%   BMI 22.42 kg/m      Physical Exam  GENERAL APPEARANCE:  Alert, in no distress, appears healthy  ENT:  Mouth and posterior oropharynx normal, moist mucous membranes, no eye drainage, redness  RESP:  respiratory effort and palpation of chest normal, lungs clear to auscultation   CV:  Palpation and auscultation of heart done , regular rate and rhythm, no murmur, rub, or gallop, no edema  ABDOMEN:  normal bowel sounds, soft, nontender  M/S:   Gait and station normal-no assistive device for ambulation   SKIN:  Inspection of skin and subcutaneous tissue baseline  NEURO:   Cranial nerves 2-12 are normal tested and grossly at patient's baseline  PSYCH:  insight and " judgement impaired, memory impaired     CBC RESULTS: Recent Labs   Lab Test 02/17/22  0751 08/25/21  1458   WBC 4.6 5.9   RBC 4.63 4.50   HGB 14.1 14.7   HCT 45.3 43.7   MCV 98 97   MCH 30.5 32.7   MCHC 31.1* 33.6   RDW 13.5 13.7    199       Last Basic Metabolic Panel:  Recent Labs   Lab Test 02/17/22  0751 08/25/21  1458    140   POTASSIUM 3.6 4.2   CHLORIDE 110* 106   NERIS 9.6 9.2   CO2 26 29   BUN 12 9   CR 0.93 0.89   * 101*       Liver Function Studies -   Recent Labs   Lab Test 08/25/21  1458   PROTTOTAL 7.3   ALBUMIN 3.8   BILITOTAL 0.5   ALKPHOS 55   AST 34   ALT 40       TSH   Date Value Ref Range Status   08/25/2021 1.08 0.40 - 4.00 mU/L Final       No results found for: A1C    Diagnostics:  No labs were ordered during this visit.   No EKG required for low risk surgery (cataract, skin procedure, breast biopsy, etc).    Revised Cardiac Risk Index (RCRI):  The patient has the following serious cardiovascular risks for perioperative complications:   - No serious cardiac risks = 0 points     RCRI Interpretation: 0 points: Class I (very low risk - 0.4% complication rate)           Signed Electronically by: JACKI Luther CNP  Copy of this evaluation report is provided to requesting physician.

## 2022-04-28 ENCOUNTER — HOSPITAL ENCOUNTER (OUTPATIENT)
Facility: CLINIC | Age: 72
Discharge: HOME | End: 2022-04-28
Admitting: NURSE PRACTITIONER
Payer: COMMERCIAL

## 2022-04-28 ENCOUNTER — LAB (OUTPATIENT)
Dept: LAB | Facility: CLINIC | Age: 72
End: 2022-04-28
Payer: COMMERCIAL

## 2022-04-28 DIAGNOSIS — Z12.11 SPECIAL SCREENING FOR MALIGNANT NEOPLASMS, COLON: ICD-10-CM

## 2022-04-28 PROCEDURE — 82274 ASSAY TEST FOR BLOOD FECAL: CPT

## 2022-05-02 DIAGNOSIS — Z12.11 SPECIAL SCREENING FOR MALIGNANT NEOPLASMS, COLON: Primary | ICD-10-CM

## 2022-05-02 LAB — HEMOCCULT STL QL IA: NEGATIVE

## 2022-05-04 ENCOUNTER — ASSISTED LIVING VISIT (OUTPATIENT)
Dept: GERIATRICS | Facility: CLINIC | Age: 72
End: 2022-05-04
Payer: COMMERCIAL

## 2022-05-04 VITALS
OXYGEN SATURATION: 95 % | SYSTOLIC BLOOD PRESSURE: 123 MMHG | WEIGHT: 132 LBS | DIASTOLIC BLOOD PRESSURE: 66 MMHG | BODY MASS INDEX: 22.53 KG/M2 | HEART RATE: 51 BPM | TEMPERATURE: 97.3 F | HEIGHT: 64 IN | RESPIRATION RATE: 16 BRPM

## 2022-05-04 DIAGNOSIS — G30.9 ALZHEIMER'S DEMENTIA WITH BEHAVIORAL DISTURBANCE, UNSPECIFIED TIMING OF DEMENTIA ONSET: Primary | ICD-10-CM

## 2022-05-04 DIAGNOSIS — R46.89 AGGRESSIVE BEHAVIOR: ICD-10-CM

## 2022-05-04 DIAGNOSIS — E78.2 MIXED HYPERLIPIDEMIA: ICD-10-CM

## 2022-05-04 DIAGNOSIS — F02.81 ALZHEIMER'S DEMENTIA WITH BEHAVIORAL DISTURBANCE, UNSPECIFIED TIMING OF DEMENTIA ONSET: Primary | ICD-10-CM

## 2022-05-04 DIAGNOSIS — I10 ESSENTIAL HYPERTENSION: ICD-10-CM

## 2022-05-04 DIAGNOSIS — F43.21 ADJUSTMENT DISORDER WITH DEPRESSED MOOD: ICD-10-CM

## 2022-05-04 RX ORDER — DONEPEZIL HYDROCHLORIDE 5 MG/1
2.5 TABLET, FILM COATED ORAL DAILY
Qty: 10.5 TABLET | Refills: 0 | Status: SHIPPED | OUTPATIENT
Start: 2022-05-04 | End: 2022-05-25
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
## 2022-05-04 NOTE — LETTER
New order Marie Connolly    1.    donepezil (ARICEPT) 5 MG tablet Take 0.5 tablets (2.5 mg) by mouth daily for 21 days Then discontinue use of medication       JACKI Luther CNP on 5/4/2022 at 11:10 AM
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
## 2022-05-11 ENCOUNTER — ASSISTED LIVING VISIT (OUTPATIENT)
Dept: GERIATRICS | Facility: CLINIC | Age: 72
End: 2022-05-11
Payer: COMMERCIAL

## 2022-05-11 ENCOUNTER — ASSISTED LIVING VISIT (OUTPATIENT)
Dept: GERIATRICS | Facility: CLINIC | Age: 72
End: 2022-05-11

## 2022-05-11 VITALS
TEMPERATURE: 98.2 F | BODY MASS INDEX: 22.61 KG/M2 | OXYGEN SATURATION: 98 % | HEART RATE: 51 BPM | DIASTOLIC BLOOD PRESSURE: 66 MMHG | SYSTOLIC BLOOD PRESSURE: 123 MMHG | WEIGHT: 132.4 LBS | HEIGHT: 64 IN | RESPIRATION RATE: 16 BRPM

## 2022-05-11 DIAGNOSIS — M25.562 LEFT KNEE PAIN, UNSPECIFIED CHRONICITY: Primary | ICD-10-CM

## 2022-05-11 DIAGNOSIS — M25.562 ACUTE PAIN OF LEFT KNEE: Primary | ICD-10-CM

## 2022-05-11 DIAGNOSIS — M71.22 BAKER CYST, LEFT: ICD-10-CM

## 2022-05-11 PROCEDURE — 99207 PR NO CHARGE LOS: CPT | Performed by: PHYSICIAN ASSISTANT

## 2022-05-11 RX ORDER — ACETAMINOPHEN 325 MG/1
TABLET ORAL
Qty: 150 TABLET | Refills: 11 | Status: SHIPPED | OUTPATIENT
Start: 2022-05-11 | End: 2023-03-20

## 2022-05-11 NOTE — PROGRESS NOTES
"Ortho Nursing home visit    Marie Connolly is a 71 year old female who resides at [facility]    Patient is seen today for Left knee pain, staff has noticed patient limping as of late, no noted trauma,      Past Medical History:   Diagnosis Date     Dementia in Alzheimer's disease (H) 11/30/2017     Essential hypertension 5/21/2021     Other disorders of pituitary gland (H) 12/27/2021     Other long term (current) drug therapy 12/27/2021     Other recurrent depressive disorders (H) 5/21/2021     Primary open angle glaucoma of both eyes, mild stage 12/27/2021     Pure hypercholesterolemia 12/27/2021     Right wrist pain 11/1/2021     Unspecified age-related cataract 12/27/2021     Vitamin D deficiency 12/27/2021      Past Surgical History:   Procedure Laterality Date     CATARACT REMOVAL  05/21/2021        No Known Allergies   There were no vitals taken for this visit.     Exam: walking with me today, has slight limp, denies pain, seated allows PROM 0-110; lig remain intact, no noted effusion, or deformity, has \"lump\" posterior in knee extension, likely \" Bakers Cyst\" no locking no crepitus,      X-rays not done    ASSESSMENT / PLAN:    Discussed with son, that she may have exacerbated the OA in the knee, would rec per NP sched TYL> and follow PRN; may benefit from injection if no improvement;    [id number]          Loan OPA-C  [phone] Cell    "

## 2022-05-11 NOTE — LETTER
New orders for Marie Connolly    1.    acetaminophen (TYLENOL) 325 MG tablet Take 2 tablets (650 mg) by mouth 2 times daily. May also take 2 tablets (650 mg) 2 times daily as needed for pain.         JACKI Luther CNP on 5/11/2022 at 12:41 PM

## 2022-05-11 NOTE — PROGRESS NOTES
[facility]  [facility] Medical Record Number:  [id number]  Place of Service where encounter took place:  [facility] [[id number]]  Chief Complaint   Patient presents with     RECHECK     Left knee pain       HPI:    Marie Connolly  is a 71 year old (1950), who is being seen today for an episodic care visit.  HPI information obtained from: family/first contact son Preston report. Today's concern is:    Resident with new onset left knee pain noted by family this past weekend. Seen today in conjunction with ortho PA Som Ramirez. She is inconsistent with reporting of pain but does appear to favor the left leg when ambulating which is new for her. No noted falls or acute trauma in facility chart review. She says pain is here and is pointing mid calf.     Past Medical and Surgical History reviewed in Epic today.    MEDICATIONS:    Current Outpatient Medications   Medication Sig Dispense Refill     acetaminophen (TYLENOL) 325 MG tablet Take 2 tablets (650 mg) by mouth 2 times daily. May also take 2 tablets (650 mg) 2 times daily as needed for pain. 150 tablet 11     amLODIPine (NORVASC) 2.5 MG tablet Take 1 tablet (2.5 mg) by mouth daily 30 tablet 11     atorvastatin (LIPITOR) 40 MG tablet Take 1 tablet (40 mg) by mouth daily 30 tablet 11     donepezil (ARICEPT) 5 MG tablet Take 0.5 tablets (2.5 mg) by mouth daily for 21 days Then discontinue use of medication 10.5 tablet 0     fenofibrate (TRICOR) 145 MG tablet Take 1 tablet (145 mg) by mouth daily 30 tablet 11     FLUoxetine (PROZAC) 10 MG capsule Take 1 capsule (10 mg) by mouth daily 30 capsule 11     memantine (NAMENDA) 10 MG tablet Take 1 tablet (10 mg) by mouth 2 times daily 60 tablet 11     Multiple Vitamins-Minerals (WOMENS MULTIVITAMIN) TABS Take 1 tablet by mouth daily 30 tablet 11     QUEtiapine (SEROQUEL) 25 MG tablet Take 0.5 tablets (12.5 mg) by mouth daily at 2 pm. May also take 0.5 tablets (12.5 mg) daily as  "needed (Agitation). 30 tablet 11     vitamin D3 (CHOLECALCIFEROL) 50 mcg (2000 units) tablet Take 1 tablet (50 mcg) by mouth daily 30 tablet 11     REVIEW OF SYSTEMS:  Limited secondary to cognitive impairment but today pt reports ok    Objective:  /66   Pulse 51   Temp 98.2  F (36.8  C)   Resp 16   Ht 1.626 m (5' 4\")   Wt 60.1 kg (132 lb 6.4 oz)   SpO2 98%   BMI 22.73 kg/m    Exam:  GENERAL APPEARANCE:  Alert, in no distress, appears healthy  ENT:  Mouth and posterior oropharynx normal, moist mucous membranes  RESP:  respiratory effort and palpation of chest normal, lungs clear to auscultation   CV:  Palpation and auscultation of heart done , regular rate and rhythm, no murmur, rub, or gallop, no edema  ABDOMEN:  normal bowel sounds, soft, nontender  M/S:   Gait and station w/o assistive device below baseline with varying limp on LLE. Lump 6cm flesh color somewhat mobile to posterior knee most noted with knee extension   SKIN:  Inspection of skin and subcutaneous tissue baseline  NEURO:   Cranial nerves 2-12 are normal tested and grossly at patient's baseline  PSYCH:  insight and judgement impaired, memory impaired     Labs:   Recent labs in [facility] reviewed by me today.     ASSESSMENT/PLAN:  (M25.562) Left knee pain, unspecified chronicity  (primary encounter diagnosis)  (M71.22) Baker cyst, left  Comment: newer onset noted this past weekend  Plan:      acetaminophen (TYLENOL) 325 MG tablet Take 2 tablets (650 mg) by mouth 2 times daily. May also take 2 tablets (650 mg) 2 times daily as needed for pain.     -consider joint injection as OA could have exacerbated issue  -ortho onsite to follow prn        Electronically signed by:  JACKI Luther CNP           "

## 2022-05-11 NOTE — LETTER
5/11/2022        RE: [facility]  [address]  [address]  [place] [address]        [facility]  [facility] Medical Record Number:  [id number]  Place of Service where encounter took place:  [facility] [[id number]]  Chief Complaint   Patient presents with     RECHECK     Left knee pain       HPI:    Marie Connolly  is a 71 year old (1950), who is being seen today for an episodic care visit.  HPI information obtained from: family/first contact son Preston report. Today's concern is:    Resident with new onset left knee pain noted by family this past weekend. Seen today in conjunction with ortho PA Som Ramirez. She is inconsistent with reporting of pain but does appear to favor the left leg when ambulating which is new for her. No noted falls or acute trauma in facility chart review. She says pain is here and is pointing mid calf.     Past Medical and Surgical History reviewed in Epic today.    MEDICATIONS:    Current Outpatient Medications   Medication Sig Dispense Refill     acetaminophen (TYLENOL) 325 MG tablet Take 2 tablets (650 mg) by mouth 2 times daily. May also take 2 tablets (650 mg) 2 times daily as needed for pain. 150 tablet 11     amLODIPine (NORVASC) 2.5 MG tablet Take 1 tablet (2.5 mg) by mouth daily 30 tablet 11     atorvastatin (LIPITOR) 40 MG tablet Take 1 tablet (40 mg) by mouth daily 30 tablet 11     donepezil (ARICEPT) 5 MG tablet Take 0.5 tablets (2.5 mg) by mouth daily for 21 days Then discontinue use of medication 10.5 tablet 0     fenofibrate (TRICOR) 145 MG tablet Take 1 tablet (145 mg) by mouth daily 30 tablet 11     FLUoxetine (PROZAC) 10 MG capsule Take 1 capsule (10 mg) by mouth daily 30 capsule 11     memantine (NAMENDA) 10 MG tablet Take 1 tablet (10 mg) by mouth 2 times daily 60 tablet 11     Multiple Vitamins-Minerals (WOMENS MULTIVITAMIN) TABS Take 1 tablet by mouth daily 30 tablet 11     QUEtiapine  "(SEROQUEL) 25 MG tablet Take 0.5 tablets (12.5 mg) by mouth daily at 2 pm. May also take 0.5 tablets (12.5 mg) daily as needed (Agitation). 30 tablet 11     vitamin D3 (CHOLECALCIFEROL) 50 mcg (2000 units) tablet Take 1 tablet (50 mcg) by mouth daily 30 tablet 11     REVIEW OF SYSTEMS:  Limited secondary to cognitive impairment but today pt reports ok    Objective:  /66   Pulse 51   Temp 98.2  F (36.8  C)   Resp 16   Ht 1.626 m (5' 4\")   Wt 60.1 kg (132 lb 6.4 oz)   SpO2 98%   BMI 22.73 kg/m    Exam:  GENERAL APPEARANCE:  Alert, in no distress, appears healthy  ENT:  Mouth and posterior oropharynx normal, moist mucous membranes  RESP:  respiratory effort and palpation of chest normal, lungs clear to auscultation   CV:  Palpation and auscultation of heart done , regular rate and rhythm, no murmur, rub, or gallop, no edema  ABDOMEN:  normal bowel sounds, soft, nontender  M/S:   Gait and station w/o assistive device below baseline with varying limp on LLE. Lump 6cm flesh color somewhat mobile to posterior knee most noted with knee extension   SKIN:  Inspection of skin and subcutaneous tissue baseline  NEURO:   Cranial nerves 2-12 are normal tested and grossly at patient's baseline  PSYCH:  insight and judgement impaired, memory impaired     Labs:   Recent labs in [facility] reviewed by me today.     ASSESSMENT/PLAN:  (M25.562) Left knee pain, unspecified chronicity  (primary encounter diagnosis)  (M71.22) Baker cyst, left  Comment: newer onset noted this past weekend  Plan:      acetaminophen (TYLENOL) 325 MG tablet Take 2 tablets (650 mg) by mouth 2 times daily. May also take 2 tablets (650 mg) 2 times daily as needed for pain.     -consider joint injection as OA could have exacerbated issue  -ortho onsite to follow prn        Electronically signed by:  JACKI Luther CNP                 Sincerely,        JACKI Luther CNP      "

## 2022-05-29 ENCOUNTER — LAB REQUISITION (OUTPATIENT)
Dept: LAB | Facility: CLINIC | Age: 72
End: 2022-05-29
Payer: COMMERCIAL

## 2022-05-29 DIAGNOSIS — U07.1 COVID-19: ICD-10-CM

## 2022-05-29 PROCEDURE — U0003 INFECTIOUS AGENT DETECTION BY NUCLEIC ACID (DNA OR RNA); SEVERE ACUTE RESPIRATORY SYNDROME CORONAVIRUS 2 (SARS-COV-2) (CORONAVIRUS DISEASE [COVID-19]), AMPLIFIED PROBE TECHNIQUE, MAKING USE OF HIGH THROUGHPUT TECHNOLOGIES AS DESCRIBED BY CMS-2020-01-R: HCPCS | Mod: ORL | Performed by: INTERNAL MEDICINE

## 2022-05-30 LAB — SARS-COV-2 RNA RESP QL NAA+PROBE: NEGATIVE

## 2022-07-27 ENCOUNTER — ASSISTED LIVING VISIT (OUTPATIENT)
Dept: GERIATRICS | Facility: CLINIC | Age: 72
End: 2022-07-27
Payer: COMMERCIAL

## 2022-07-27 VITALS
BODY MASS INDEX: 21.85 KG/M2 | SYSTOLIC BLOOD PRESSURE: 120 MMHG | HEIGHT: 64 IN | RESPIRATION RATE: 16 BRPM | TEMPERATURE: 97.2 F | WEIGHT: 128 LBS | HEART RATE: 78 BPM | DIASTOLIC BLOOD PRESSURE: 85 MMHG | OXYGEN SATURATION: 97 %

## 2022-07-27 DIAGNOSIS — G30.9 ALZHEIMER'S DEMENTIA WITH BEHAVIORAL DISTURBANCE, UNSPECIFIED TIMING OF DEMENTIA ONSET: ICD-10-CM

## 2022-07-27 DIAGNOSIS — I10 ESSENTIAL HYPERTENSION: ICD-10-CM

## 2022-07-27 DIAGNOSIS — R46.89 AGGRESSIVE BEHAVIOR: ICD-10-CM

## 2022-07-27 DIAGNOSIS — R19.7 DIARRHEA, UNSPECIFIED TYPE: ICD-10-CM

## 2022-07-27 DIAGNOSIS — F43.21 ADJUSTMENT DISORDER WITH DEPRESSED MOOD: ICD-10-CM

## 2022-07-27 DIAGNOSIS — F02.81 ALZHEIMER'S DEMENTIA WITH BEHAVIORAL DISTURBANCE, UNSPECIFIED TIMING OF DEMENTIA ONSET: ICD-10-CM

## 2022-07-27 DIAGNOSIS — M25.562 LEFT KNEE PAIN, UNSPECIFIED CHRONICITY: Primary | ICD-10-CM

## 2022-07-27 NOTE — LETTER
7/27/2022        RE: [facility]  [address]  [address]  [place] [address]        [facility]  [facility] Medical Record Number:  [id number]  Place of Service where encounter took place:  [facility] [[id number]]  Chief Complaint   Patient presents with     RECHECK       HPI:    Marie Connolly  is a 71 year old (1950), who is being seen today for an episodic care visit.  HPI information obtained from: facility staff, patient report and [facility] chart review. Today's concern is:    Seen today for follow up to chronic conditions. Staff reporting she is less aggressive and agitated on the unit. Does get tearful at times for no apparent reason. No prn Seroquel used recently noted in chart review. Did have recent JSEUS with family. Did have loose stool this am as others on the memory care unit. Chronic trace swelling in left knee. Ambulates without assistive device. Some limping at times.     Past Medical and Surgical History reviewed in Epic today.    MEDICATIONS:    Current Outpatient Medications   Medication Sig Dispense Refill     acetaminophen (TYLENOL) 325 MG tablet Take 2 tablets (650 mg) by mouth 2 times daily. May also take 2 tablets (650 mg) 2 times daily as needed for pain. 150 tablet 11     amLODIPine (NORVASC) 2.5 MG tablet Take 1 tablet (2.5 mg) by mouth daily 30 tablet 11     atorvastatin (LIPITOR) 40 MG tablet Take 1 tablet (40 mg) by mouth daily 30 tablet 11     fenofibrate (TRICOR) 145 MG tablet Take 1 tablet (145 mg) by mouth daily 30 tablet 11     FLUoxetine (PROZAC) 10 MG capsule Take 1 capsule (10 mg) by mouth daily 30 capsule 11     memantine (NAMENDA) 10 MG tablet Take 1 tablet (10 mg) by mouth 2 times daily 60 tablet 11     Multiple Vitamins-Minerals (WOMENS MULTIVITAMIN) TABS Take 1 tablet by mouth daily 30 tablet 11     QUEtiapine (SEROQUEL) 25 MG tablet Take 0.5 tablets (12.5 mg) by mouth daily at 2  "pm. May also take 0.5 tablets (12.5 mg) daily as needed (Agitation). 30 tablet 11     vitamin D3 (CHOLECALCIFEROL) 50 mcg (2000 units) tablet Take 1 tablet (50 mcg) by mouth daily 30 tablet 11     REVIEW OF SYSTEMS:  Limited secondary to cognitive impairment but today pt reports ok    Objective:  /85   Pulse 78   Temp 97.2  F (36.2  C)   Resp 16   Ht 1.626 m (5' 4\")   Wt 58.1 kg (128 lb)   SpO2 97%   BMI 21.97 kg/m    Exam:  GENERAL APPEARANCE:  Alert, in no distress, appears healthy  ENT:  Mouth and posterior oropharynx normal, moist mucous membranes  RESP:  respiratory effort and palpation of chest normal, lungs clear to auscultation   CV:  Palpation and auscultation of heart done , regular rate and rhythm, no murmur, rub, or gallop, trace edema  ABDOMEN:  normal bowel sounds, soft, nontender  M/S:   Gait and station w/o assistive device-limps at times, left knee swelling   SKIN:  Inspection of skin and subcutaneous tissue baseline  NEURO:   Cranial nerves 2-12 are normal tested and grossly at patient's baseline  PSYCH:  insight and judgement impaired, memory impaired     Labs:   CBC RESULTS: Recent Labs   Lab Test 02/17/22  0751 08/25/21  1458   WBC 4.6 5.9   RBC 4.63 4.50   HGB 14.1 14.7   HCT 45.3 43.7   MCV 98 97   MCH 30.5 32.7   MCHC 31.1* 33.6   RDW 13.5 13.7    199       Last Basic Metabolic Panel:  Recent Labs   Lab Test 02/17/22  0751 08/25/21  1458    140   POTASSIUM 3.6 4.2   CHLORIDE 110* 106   NERIS 9.6 9.2   CO2 26 29   BUN 12 9   CR 0.93 0.89   * 101*       Liver Function Studies -   Recent Labs   Lab Test 08/25/21  1458   PROTTOTAL 7.3   ALBUMIN 3.8   BILITOTAL 0.5   ALKPHOS 55   AST 34   ALT 40       TSH   Date Value Ref Range Status   08/25/2021 1.08 0.40 - 4.00 mU/L Final       No results found for: A1C    ASSESSMENT/PLAN:  (M25.562) Left knee pain, unspecified chronicity  (primary encounter diagnosis)  Comment: ongoing-ortho eval at previous visit and noted " bakers cyst posterior knee  Plan:  -tylenol 650 mg po BID and twice daily as needed  -consider joint injection if family agreeable and continues to limp    (G30.9,  F02.81) Alzheimer's dementia with behavioral disturbance, unspecified timing of dementia onset (H)  (R46.89) Aggressive behavior  (F43.21) Adjustment disorder with depressed mood  Comment: improved   Plan:   -continue current plan of care    (I10) Essential hypertension  Comment: stable   Plan:   -continue current plan of care    (R19.7) Diarrhea, unspecified type  Comment: noted today  Plan:  -encourage oral intake of fluids  -add prn loperamide if continue       Electronically signed by:  JACKI Luther CNP                 Sincerely,        JACKI Luther CNP

## 2022-07-27 NOTE — PROGRESS NOTES
[place] [facility]  [place] [facility] Record Number:  [id number]  Place of Service where encounter took place:  [facility] [[id number]]  Chief Complaint   Patient presents with     RECHECK       HPI:    Marie Connolly  is a 71 year old (1950), who is being seen today for an episodic care visit.  HPI information obtained from: facility staff, patient report and [facility] chart review. Today's concern is:    Seen today for follow up to chronic conditions. Staff reporting she is less aggressive and agitated on the unit. Does get tearful at times for no apparent reason. No prn Seroquel used recently noted in chart review. Did have recent JESUS with family. Did have loose stool this am as others on the memory care unit. Chronic trace swelling in left knee. Ambulates without assistive device. Some limping at times.     Past Medical and Surgical History reviewed in Epic today.    MEDICATIONS:    Current Outpatient Medications   Medication Sig Dispense Refill     acetaminophen (TYLENOL) 325 MG tablet Take 2 tablets (650 mg) by mouth 2 times daily. May also take 2 tablets (650 mg) 2 times daily as needed for pain. 150 tablet 11     amLODIPine (NORVASC) 2.5 MG tablet Take 1 tablet (2.5 mg) by mouth daily 30 tablet 11     atorvastatin (LIPITOR) 40 MG tablet Take 1 tablet (40 mg) by mouth daily 30 tablet 11     fenofibrate (TRICOR) 145 MG tablet Take 1 tablet (145 mg) by mouth daily 30 tablet 11     FLUoxetine (PROZAC) 10 MG capsule Take 1 capsule (10 mg) by mouth daily 30 capsule 11     memantine (NAMENDA) 10 MG tablet Take 1 tablet (10 mg) by mouth 2 times daily 60 tablet 11     Multiple Vitamins-Minerals (WOMENS MULTIVITAMIN) TABS Take 1 tablet by mouth daily 30 tablet 11     QUEtiapine (SEROQUEL) 25 MG tablet Take 0.5 tablets (12.5 mg) by mouth daily at 2 pm. May also take 0.5 tablets (12.5 mg) daily as needed (Agitation). 30 tablet 11     vitamin D3 (CHOLECALCIFEROL) 50 mcg  "(2000 units) tablet Take 1 tablet (50 mcg) by mouth daily 30 tablet 11     REVIEW OF SYSTEMS:  Limited secondary to cognitive impairment but today pt reports ok    Objective:  /85   Pulse 78   Temp 97.2  F (36.2  C)   Resp 16   Ht 1.626 m (5' 4\")   Wt 58.1 kg (128 lb)   SpO2 97%   BMI 21.97 kg/m    Exam:  GENERAL APPEARANCE:  Alert, in no distress, appears healthy  ENT:  Mouth and posterior oropharynx normal, moist mucous membranes  RESP:  respiratory effort and palpation of chest normal, lungs clear to auscultation   CV:  Palpation and auscultation of heart done , regular rate and rhythm, no murmur, rub, or gallop, trace edema  ABDOMEN:  normal bowel sounds, soft, nontender  M/S:   Gait and station w/o assistive device-limps at times, left knee swelling   SKIN:  Inspection of skin and subcutaneous tissue baseline  NEURO:   Cranial nerves 2-12 are normal tested and grossly at patient's baseline  PSYCH:  insight and judgement impaired, memory impaired     Labs:   CBC RESULTS: Recent Labs   Lab Test 02/17/22  0751 08/25/21  1458   WBC 4.6 5.9   RBC 4.63 4.50   HGB 14.1 14.7   HCT 45.3 43.7   MCV 98 97   MCH 30.5 32.7   MCHC 31.1* 33.6   RDW 13.5 13.7    199       Last Basic Metabolic Panel:  Recent Labs   Lab Test 02/17/22  0751 08/25/21  1458    140   POTASSIUM 3.6 4.2   CHLORIDE 110* 106   NERIS 9.6 9.2   CO2 26 29   BUN 12 9   CR 0.93 0.89   * 101*       Liver Function Studies -   Recent Labs   Lab Test 08/25/21  1458   PROTTOTAL 7.3   ALBUMIN 3.8   BILITOTAL 0.5   ALKPHOS 55   AST 34   ALT 40       TSH   Date Value Ref Range Status   08/25/2021 1.08 0.40 - 4.00 mU/L Final       No results found for: A1C    ASSESSMENT/PLAN:  (M25.562) Left knee pain, unspecified chronicity  (primary encounter diagnosis)  Comment: ongoing-ortho eval at previous visit and noted bakers cyst posterior knee  Plan:  -tylenol 650 mg po BID and twice daily as needed  -consider joint injection if family " agreeable and continues to limp    (G30.9,  F02.81) Alzheimer's dementia with behavioral disturbance, unspecified timing of dementia onset (H)  (R46.89) Aggressive behavior  (F43.21) Adjustment disorder with depressed mood  Comment: improved   Plan:   -continue current plan of care    (I10) Essential hypertension  Comment: stable   Plan:   -continue current plan of care    (R19.7) Diarrhea, unspecified type  Comment: noted today  Plan:  -encourage oral intake of fluids  -add prn loperamide if continue       Electronically signed by:  JACKI Luther CNP

## 2022-09-28 ENCOUNTER — ASSISTED LIVING VISIT (OUTPATIENT)
Dept: GERIATRICS | Facility: CLINIC | Age: 72
End: 2022-09-28
Payer: COMMERCIAL

## 2022-09-28 VITALS
BODY MASS INDEX: 21.89 KG/M2 | DIASTOLIC BLOOD PRESSURE: 40 MMHG | HEART RATE: 70 BPM | WEIGHT: 128.2 LBS | OXYGEN SATURATION: 97 % | HEIGHT: 64 IN | TEMPERATURE: 97.3 F | SYSTOLIC BLOOD PRESSURE: 122 MMHG | RESPIRATION RATE: 30 BRPM

## 2022-09-28 DIAGNOSIS — F02.81 ALZHEIMER'S DEMENTIA WITH BEHAVIORAL DISTURBANCE, UNSPECIFIED TIMING OF DEMENTIA ONSET: ICD-10-CM

## 2022-09-28 DIAGNOSIS — G30.9 ALZHEIMER'S DEMENTIA WITH BEHAVIORAL DISTURBANCE, UNSPECIFIED TIMING OF DEMENTIA ONSET: ICD-10-CM

## 2022-09-28 DIAGNOSIS — R23.8 SKIN IRRITATION: ICD-10-CM

## 2022-09-28 DIAGNOSIS — F43.21 ADJUSTMENT DISORDER WITH DEPRESSED MOOD: ICD-10-CM

## 2022-09-28 DIAGNOSIS — R46.89 AGGRESSIVE BEHAVIOR: ICD-10-CM

## 2022-09-28 DIAGNOSIS — S90.416A ABRASION OF TOE, UNSPECIFIED LATERALITY, INITIAL ENCOUNTER: Primary | ICD-10-CM

## 2022-09-28 NOTE — PROGRESS NOTES
[place] [facility]  [place] Medical Record Number:  [id number]  Place of Service where encounter took place:  [facility] [[id number]]  Chief Complaint   Patient presents with     RECHECK       HPI:    Marie Connolly  is a 72 year old (1950), who is being seen today for an episodic care visit.  HPI information obtained from: facility chart records, facility staff, [facility] chart review and family/first contact son report. Today's concern is:    Seen today for follow up to chronic conditions. Appears to have settled into memory care with less outbursts. Facility chart reviewed and 1 dose of prn Seroquel used this month. Her son with concerns about current footwear and bony reddened prominences of both great toes and little toes. She denies any pain today. Only likes to wear slip on keds with no socks. Ambulates without assistive device and mostly steady.       Past Medical and Surgical History reviewed in Epic today.    MEDICATIONS:    Current Outpatient Medications   Medication Sig Dispense Refill     acetaminophen (TYLENOL) 325 MG tablet Take 2 tablets (650 mg) by mouth 2 times daily. May also take 2 tablets (650 mg) 2 times daily as needed for pain. 150 tablet 11     amLODIPine (NORVASC) 2.5 MG tablet Take 1 tablet (2.5 mg) by mouth daily 30 tablet 11     atorvastatin (LIPITOR) 40 MG tablet Take 1 tablet (40 mg) by mouth daily 30 tablet 11     fenofibrate (TRICOR) 145 MG tablet Take 1 tablet (145 mg) by mouth daily 30 tablet 11     FLUoxetine (PROZAC) 10 MG capsule Take 1 capsule (10 mg) by mouth daily 30 capsule 11     memantine (NAMENDA) 10 MG tablet Take 1 tablet (10 mg) by mouth 2 times daily 60 tablet 11     Multiple Vitamins-Minerals (WOMENS MULTIVITAMIN) TABS Take 1 tablet by mouth daily 30 tablet 11     QUEtiapine (SEROQUEL) 25 MG tablet Take 0.5 tablets (12.5 mg) by mouth daily at 2 pm. May also take 0.5 tablets (12.5 mg) daily as needed (Agitation). 30  "tablet 11     vitamin D3 (CHOLECALCIFEROL) 50 mcg (2000 units) tablet Take 1 tablet (50 mcg) by mouth daily 30 tablet 11     REVIEW OF SYSTEMS:  Unobtainable secondary to cognitive impairment.     Objective:  /40   Pulse 70   Temp 97.3  F (36.3  C)   Resp 30   Ht 1.626 m (5' 4\")   Wt 58.2 kg (128 lb 3.2 oz)   SpO2 97%   BMI 22.01 kg/m    Exam:  GENERAL APPEARANCE:  Alert, in no distress, appears healthy  ENT:  Mouth and posterior oropharynx normal, moist mucous membranes, dentures   RESP:  respiratory effort and palpation of chest normal, lungs clear to auscultation   CV:  Palpation and auscultation of heart done , regular rate and rhythm, no murmur, rub, or gallop, trace edema bilateral   ABDOMEN:  normal bowel sounds, soft, nontender  M/S:   Gait and station w/o assistive device-limps at times (not today) left knee swelling-chronic    SKIN:  great toes and little toes irritated and red. Skin intact, no callus or blisters noted   NEURO:   Cranial nerves 2-12 are normal tested and grossly at patient's baseline  PSYCH:  insight and judgement impaired, memory impaired      Labs:   CBC RESULTS: Recent Labs   Lab Test 02/17/22  0751 08/25/21  1458   WBC 4.6 5.9   RBC 4.63 4.50   HGB 14.1 14.7   HCT 45.3 43.7   MCV 98 97   MCH 30.5 32.7   MCHC 31.1* 33.6   RDW 13.5 13.7    199       Last Basic Metabolic Panel:  Recent Labs   Lab Test 02/17/22  0751 08/25/21  1458    140   POTASSIUM 3.6 4.2   CHLORIDE 110* 106   NERIS 9.6 9.2   CO2 26 29   BUN 12 9   CR 0.93 0.89   * 101*       Liver Function Studies -   Recent Labs   Lab Test 08/25/21  1458   PROTTOTAL 7.3   ALBUMIN 3.8   BILITOTAL 0.5   ALKPHOS 55   AST 34   ALT 40       TSH   Date Value Ref Range Status   08/25/2021 1.08 0.40 - 4.00 mU/L Final       No results found for: A1C    ASSESSMENT/PLAN:  (S90.416A) Abrasion of toe, unspecified laterality, initial encounter  (primary encounter diagnosis)  (R23.8) Skin irritation   Comment: " bilateral to great toes and little toes-skin intact   Plan:  -family assist for new shoes. Encouragement to wear socks daily. They will bring resident to [facility] for fitting of footwear with wider toe box, arch support, ankle support   -consider podiatry as alternative option or if areas of concern worsen for orthotics     (G30.9,  F02.81) Alzheimer's dementia with behavioral disturbance, unspecified timing of dementia onset  (R46.89) Aggressive behavior  (F43.21) Adjustment disorder with depressed mood  Comment: improved from admission to   Plan:   -continue current plan of care       Electronically signed by:  JACKI Luther CNP

## 2022-09-28 NOTE — LETTER
9/28/2022        RE: [facility]  [address]  [address]  [place] [address]        [place] [facility]  [place] [facility] Record Number:  [id number]  Place of Service where encounter took place:  [facility] [[id number]]  Chief Complaint   Patient presents with     RECHECK       HPI:    Marie Connolly  is a 72 year old (1950), who is being seen today for an episodic care visit.  HPI information obtained from: facility chart records, facility staff, [facility] chart review and family/first contact son report. Today's concern is:    Seen today for follow up to chronic conditions. Appears to have settled into memory care with less outbursts. Facility chart reviewed and 1 dose of prn Seroquel used this month. Her son with concerns about current footwear and bony reddened prominences of both great toes and little toes. She denies any pain today. Only likes to wear slip on keds with no socks. Ambulates without assistive device and mostly steady.       Past Medical and Surgical History reviewed in Epic today.    MEDICATIONS:    Current Outpatient Medications   Medication Sig Dispense Refill     acetaminophen (TYLENOL) 325 MG tablet Take 2 tablets (650 mg) by mouth 2 times daily. May also take 2 tablets (650 mg) 2 times daily as needed for pain. 150 tablet 11     amLODIPine (NORVASC) 2.5 MG tablet Take 1 tablet (2.5 mg) by mouth daily 30 tablet 11     atorvastatin (LIPITOR) 40 MG tablet Take 1 tablet (40 mg) by mouth daily 30 tablet 11     fenofibrate (TRICOR) 145 MG tablet Take 1 tablet (145 mg) by mouth daily 30 tablet 11     FLUoxetine (PROZAC) 10 MG capsule Take 1 capsule (10 mg) by mouth daily 30 capsule 11     memantine (NAMENDA) 10 MG tablet Take 1 tablet (10 mg) by mouth 2 times daily 60 tablet 11     Multiple Vitamins-Minerals (WOMENS MULTIVITAMIN) TABS Take 1 tablet by mouth daily 30 tablet 11     QUEtiapine (SEROQUEL) 25 MG  "tablet Take 0.5 tablets (12.5 mg) by mouth daily at 2 pm. May also take 0.5 tablets (12.5 mg) daily as needed (Agitation). 30 tablet 11     vitamin D3 (CHOLECALCIFEROL) 50 mcg (2000 units) tablet Take 1 tablet (50 mcg) by mouth daily 30 tablet 11     REVIEW OF SYSTEMS:  Unobtainable secondary to cognitive impairment.     Objective:  /40   Pulse 70   Temp 97.3  F (36.3  C)   Resp 30   Ht 1.626 m (5' 4\")   Wt 58.2 kg (128 lb 3.2 oz)   SpO2 97%   BMI 22.01 kg/m    Exam:  GENERAL APPEARANCE:  Alert, in no distress, appears healthy  ENT:  Mouth and posterior oropharynx normal, moist mucous membranes, dentures   RESP:  respiratory effort and palpation of chest normal, lungs clear to auscultation   CV:  Palpation and auscultation of heart done , regular rate and rhythm, no murmur, rub, or gallop, trace edema bilateral   ABDOMEN:  normal bowel sounds, soft, nontender  M/S:   Gait and station w/o assistive device-limps at times (not today) left knee swelling-chronic    SKIN:  great toes and little toes irritated and red. Skin intact, no callus or blisters noted   NEURO:   Cranial nerves 2-12 are normal tested and grossly at patient's baseline  PSYCH:  insight and judgement impaired, memory impaired      Labs:   CBC RESULTS: Recent Labs   Lab Test 02/17/22  0751 08/25/21  1458   WBC 4.6 5.9   RBC 4.63 4.50   HGB 14.1 14.7   HCT 45.3 43.7   MCV 98 97   MCH 30.5 32.7   MCHC 31.1* 33.6   RDW 13.5 13.7    199       Last Basic Metabolic Panel:  Recent Labs   Lab Test 02/17/22  0751 08/25/21  1458    140   POTASSIUM 3.6 4.2   CHLORIDE 110* 106   NERIS 9.6 9.2   CO2 26 29   BUN 12 9   CR 0.93 0.89   * 101*       Liver Function Studies -   Recent Labs   Lab Test 08/25/21  1458   PROTTOTAL 7.3   ALBUMIN 3.8   BILITOTAL 0.5   ALKPHOS 55   AST 34   ALT 40       TSH   Date Value Ref Range Status   08/25/2021 1.08 0.40 - 4.00 mU/L Final       No results found for: A1C    ASSESSMENT/PLAN:  (S90.416A) Abrasion " of toe, unspecified laterality, initial encounter  (primary encounter diagnosis)  (R23.8) Skin irritation   Comment: bilateral to great toes and little toes-skin intact   Plan:  -family assist for new shoes. Encouragement to wear socks daily. They will bring resident to [facility] for fitting of footwear with wider toe box, arch support, ankle support   -consider podiatry as alternative option or if areas of concern worsen for orthotics     (G30.9,  F02.81) Alzheimer's dementia with behavioral disturbance, unspecified timing of dementia onset  (R46.89) Aggressive behavior  (F43.21) Adjustment disorder with depressed mood  Comment: improved from admission to   Plan:   -continue current plan of care       Electronically signed by:  JACKI Luther CNP                 Sincerely,        JACKI Luther CNP

## 2022-10-10 ENCOUNTER — HEALTH MAINTENANCE LETTER (OUTPATIENT)
Age: 72
End: 2022-10-10

## 2022-11-05 ENCOUNTER — LAB REQUISITION (OUTPATIENT)
Dept: LAB | Facility: CLINIC | Age: 72
End: 2022-11-05
Payer: COMMERCIAL

## 2022-11-05 DIAGNOSIS — N39.0 URINARY TRACT INFECTION, SITE NOT SPECIFIED: ICD-10-CM

## 2022-11-05 DIAGNOSIS — R41.82 ALTERED MENTAL STATUS, UNSPECIFIED: ICD-10-CM

## 2022-11-05 LAB
ALBUMIN UR-MCNC: NEGATIVE MG/DL
APPEARANCE UR: ABNORMAL
BACTERIA #/AREA URNS HPF: ABNORMAL /HPF
BILIRUB UR QL STRIP: NEGATIVE
COLOR UR AUTO: YELLOW
GLUCOSE UR STRIP-MCNC: NEGATIVE MG/DL
HGB UR QL STRIP: NEGATIVE
KETONES UR STRIP-MCNC: NEGATIVE MG/DL
LEUKOCYTE ESTERASE UR QL STRIP: NEGATIVE
MUCOUS THREADS #/AREA URNS LPF: PRESENT /LPF
NITRATE UR QL: NEGATIVE
PH UR STRIP: 7.5 [PH] (ref 5–7)
RBC URINE: 0 /HPF
SP GR UR STRIP: 1.02 (ref 1–1.03)
SQUAMOUS EPITHELIAL: <1 /HPF
UROBILINOGEN UR STRIP-MCNC: 2 MG/DL
WBC URINE: 4 /HPF

## 2022-11-05 PROCEDURE — 81001 URINALYSIS AUTO W/SCOPE: CPT | Mod: ORL | Performed by: NURSE PRACTITIONER

## 2022-12-21 ENCOUNTER — ASSISTED LIVING VISIT (OUTPATIENT)
Dept: GERIATRICS | Facility: CLINIC | Age: 72
End: 2022-12-21
Payer: COMMERCIAL

## 2022-12-21 ENCOUNTER — TELEPHONE (OUTPATIENT)
Dept: GERIATRICS | Facility: CLINIC | Age: 72
End: 2022-12-21

## 2022-12-21 VITALS
RESPIRATION RATE: 16 BRPM | SYSTOLIC BLOOD PRESSURE: 132 MMHG | BODY MASS INDEX: 21.92 KG/M2 | HEIGHT: 64 IN | DIASTOLIC BLOOD PRESSURE: 67 MMHG | TEMPERATURE: 97.8 F | WEIGHT: 128.4 LBS | OXYGEN SATURATION: 98 % | HEART RATE: 9 BPM

## 2022-12-21 DIAGNOSIS — M25.562 LEFT KNEE PAIN, UNSPECIFIED CHRONICITY: ICD-10-CM

## 2022-12-21 DIAGNOSIS — M17.0 PRIMARY OSTEOARTHRITIS OF BOTH KNEES: ICD-10-CM

## 2022-12-21 DIAGNOSIS — G30.9 DEMENTIA IN ALZHEIMER'S DISEASE (H): Primary | ICD-10-CM

## 2022-12-21 DIAGNOSIS — R46.89 AGGRESSIVE BEHAVIOR: ICD-10-CM

## 2022-12-21 DIAGNOSIS — F43.21 ADJUSTMENT DISORDER WITH DEPRESSED MOOD: ICD-10-CM

## 2022-12-21 DIAGNOSIS — Z23 INFLUENZA VACCINE NEEDED: ICD-10-CM

## 2022-12-21 DIAGNOSIS — I10 ESSENTIAL HYPERTENSION: ICD-10-CM

## 2022-12-21 DIAGNOSIS — F02.80 DEMENTIA IN ALZHEIMER'S DISEASE (H): Primary | ICD-10-CM

## 2022-12-21 RX ORDER — DULOXETIN HYDROCHLORIDE 20 MG/1
20 CAPSULE, DELAYED RELEASE ORAL DAILY
Qty: 30 CAPSULE | Refills: 3 | Status: SHIPPED | OUTPATIENT
Start: 2022-12-21 | End: 2023-03-20

## 2022-12-21 NOTE — PROGRESS NOTES
"[place] [facility]  Chief Complaint   Patient presents with     RECHECK     Wellness Visit     [facility] Record Number:  [id number]  Place of Service where encounter took place:  [facility] [[id number]]    HPI:    Marie Connolly  is a 72 year old  (1950), who is being seen today for an annual comprehensive visit. HPI information obtained from: facility chart records, facility staff, patient report and [facility] chart review.  Today's concerns are:    Seen today for chronic disease management. No acute concerns from staff. Mostly redirectable, low dose Seroquel has been helpful. Left message for her son as wanted to discuss possible change to antidepressant. Denies sadness today. Wanted to \"give me a hug\" which is usual for her. Chronic left knee pain but denies today. Ambulates w/o assistive device.       ALLERGIES: Patient has no known allergies.  PAST MEDICAL HISTORY:  has a past medical history of Dementia in Alzheimer's disease (H) (11/30/2017), Essential hypertension (5/21/2021), Other disorders of pituitary gland (H) (12/27/2021), Other long term (current) drug therapy (12/27/2021), Other recurrent depressive disorders (H) (5/21/2021), Primary open angle glaucoma of both eyes, mild stage (12/27/2021), Pure hypercholesterolemia (12/27/2021), Right wrist pain (11/1/2021), Unspecified age-related cataract (12/27/2021), and Vitamin D deficiency (12/27/2021).  PAST SURGICAL HISTORY:  has a past surgical history that includes CATARACT REMOVAL (05/21/2021).  IMMUNIZATIONS:  Immunization History   Administered Date(s) Administered     COVID-19 Vaccine (G[place]elle) 03/13/2021     COVID-19 Vaccine 12+ (Pfizer) 06/22/2022     COVID-19 Vaccine 18+ (Moderna) 11/23/2021     COVID-19 Vaccine Bivalent Booster 12+ (Pfizer) 11/17/2022     Flu 65+ Years 01/31/2018     Influenza (High Dose) 3 valent vaccine 10/03/2016, 08/17/2020     Influenza (IIV3) PF 10/29/2010, 09/09/2011, " "09/18/2012, 09/16/2013     Influenza Vaccine 65+ (Fluzone HD) 09/04/2020, 09/23/2021, 10/05/2022     Influenza Vaccine >6 months (Alfuria,Fluzone) 10/14/2014, 09/25/2015, 10/21/2019     Pneumo Conj 13-V (2010&after) 10/03/2016     Pneumococcal 23 valent 06/05/2018     TD (ADULT, 7+) 01/04/2007     Tdap (Adacel,Boostrix) 04/22/2016     Zoster vaccine, live 10/15/2012     Above immunizations pulled from [facility] [employer]. [facility] and facility records also reconciled. Outstanding information sent to  to update [facility].  Future immunizations needed:  yearly influenza per facility protocol    Current Outpatient Medications   Medication Sig Dispense Refill     acetaminophen (TYLENOL) 325 MG tablet Take 2 tablets (650 mg) by mouth 2 times daily. May also take 2 tablets (650 mg) 2 times daily as needed for pain. 150 tablet 11     amLODIPine (NORVASC) 2.5 MG tablet Take 1 tablet (2.5 mg) by mouth daily 30 tablet 11     atorvastatin (LIPITOR) 40 MG tablet Take 1 tablet (40 mg) by mouth daily 30 tablet 11     fenofibrate (TRICOR) 145 MG tablet Take 1 tablet (145 mg) by mouth daily 30 tablet 11     FLUoxetine (PROZAC) 10 MG capsule Take 1 capsule (10 mg) by mouth daily 30 capsule 11     memantine (NAMENDA) 10 MG tablet Take 1 tablet (10 mg) by mouth 2 times daily 60 tablet 11     Multiple Vitamins-Minerals (WOMENS MULTIVITAMIN) TABS Take 1 tablet by mouth daily 30 tablet 11     QUEtiapine (SEROQUEL) 25 MG tablet Take 0.5 tablets (12.5 mg) by mouth daily at 2 pm. May also take 0.5 tablets (12.5 mg) daily as needed (Agitation). 30 tablet 11     vitamin D3 (CHOLECALCIFEROL) 50 mcg (2000 units) tablet Take 1 tablet (50 mcg) by mouth daily 30 tablet 11     ROS:  Limited secondary to cognitive impairment but today pt reports fine     Vitals:  /67   Pulse (!) 9   Temp 97.8  F (36.6  C)   Resp 16   Ht 1.626 m (5' 4\")   Wt 58.2 kg (128 lb 6.4 oz)   SpO2 98%   BMI 22.04 kg/m   Body mass index is 22.04 " kg/m .  Exam:  GENERAL APPEARANCE:  Alert, in no distress, appears healthy  ENT:  Mouth and posterior oropharynx normal, moist mucous membranes  RESP:  respiratory effort and palpation of chest normal, lungs clear to auscultation   CV:  Palpation and auscultation of heart done , regular rate and rhythm, no murmur, rub, or gallop, trace edema  ABDOMEN:  normal bowel sounds, soft, nontender  M/S:   Gait and station w/o assistive device-limps at times (not today), ROM with both knees  SKIN:  Inspection of skin and subcutaneous tissue baseline  NEURO:   Cranial nerves 2-12 are normal tested and grossly at patient's baseline  PSYCH:  insight and judgement impaired, memory impaired     Lab/Diagnostic data:   CBC RESULTS: Recent Labs   Lab Test 02/17/22  0751 08/25/21  1458   WBC 4.6 5.9   RBC 4.63 4.50   HGB 14.1 14.7   HCT 45.3 43.7   MCV 98 97   MCH 30.5 32.7   MCHC 31.1* 33.6   RDW 13.5 13.7    199       Last Basic Metabolic Panel:  Recent Labs   Lab Test 02/17/22  0751 08/25/21  1458    140   POTASSIUM 3.6 4.2   CHLORIDE 110* 106   NERIS 9.6 9.2   CO2 26 29   BUN 12 9   CR 0.93 0.89   * 101*       Liver Function Studies -   Recent Labs   Lab Test 08/25/21  1458   PROTTOTAL 7.3   ALBUMIN 3.8   BILITOTAL 0.5   ALKPHOS 55   AST 34   ALT 40       TSH   Date Value Ref Range Status   08/25/2021 1.08 0.40 - 4.00 mU/L Final       No results found for: A1C    ASSESSMENT/PLAN  (G30.9,  F02.80) Dementia in Alzheimer's disease (H)  (R46.89) Aggressive behavior  (F43.21) Adjustment disorder with depressed mood  Comment: improved-no prn Seroquel for several months   Plan:   -continue current plan of care  -could consider discontinuing fluoxetine and starting Escitalopram or duloxetine      (I10) Essential hypertension  Comment: stable   Plan:   -continue current plan of care    (M25.562) Left knee pain, unspecified chronicity    (M17.0) Primary osteoarthritis of both knees  Comment: ongoing-ortho eval at previous  "visit and noted bakers cyst posterior knee  Plan:  -tylenol 650 mg po BID and twice daily as needed  -consider joint injection if family agreeable and continues to limp  -consider voltaren BID?      (Z23) Influenza vaccine needed  Comment: clinic onsite but unclear if she received her vaccine this year  Plan:  -order for nursing to administer if not received       Electronically signed by:  JACKI Luther CNP     Annual Wellness Visit    Are you in the first 12 months of your Medicare Part B coverage?  No    Physical Health:    In general, how would you rate your overall physical health? fair    Outside of work, how many days during the week do you exercise?6-7 days/week    Outside of work, approximately how many minutes a day do you exercise?15-30 minutes    If you drink alcohol do you typically have >3 drinks per day or >7 drinks per week? No    Do you usually eat at least 4 servings of fruit and vegetables a day, include whole grains & fiber and avoid regularly eating high fat or \"junk\" foods? Yes    Do you have any problems taking medications regularly? No    Do you have any side effects from medications? none    Needs assistance for the following daily activities: telephone use, transportation, shopping, preparing meals, housework, bathing, laundry, money management and taking medicine    Which of the following safety concerns are present in your home?  none identified     Hearing impairment: No    In the past 6 months, have you been bothered by leaking of urine? no    Mental Health:    In general, how would you rate your overall mental or emotional health? fair      PHQ-2 Score:       PHQ-2 ( 1999 Pfizer) 12/21/2022 8/25/2021   Q1: Little interest or pleasure in doing things 0 0   Q2: Feeling down, depressed or hopeless 0 0   PHQ-2 Score 0 0   PHQ-2 Total Score (12-17 Years)- Positive if 3 or more points; Administer PHQ-A if positive - 0          Do you feel safe in your environment? Yes    Have you " ever done Advance Care Planning? (For example, a Health Directive, POLST, or a discussion with a medical provider or your loved ones about your wishes)? Yes, advance care planning is on file.    Fall risk: none    Cognitive Screening: Mini Cog score of 2 dementia     Current providers sharing in care for this patient include: Patient Care Team:  Armaan Carr APRN CNP as PCP - General (Internal Medicine)  Iker Nath DO as Assigned Musculoskeletal Provider  ([facility]), [facility] as Other (see comments)  Armaan Carr APRN CNP as Assigned PCP    JACKI Luther CNP

## 2022-12-21 NOTE — LETTER
Marie Connolly orders    1. Discontinue Fluoxetine     2. Begin   DULoxetine (CYMBALTA) 20 MG capsule Take 1 capsule (20 mg) by mouth daily     3. Begin    diclofenac (VOLTAREN) 1 % topical gel Apply 2 g topically daily Apply each morning to both knees. Keep in locked cabinet     4. Give   influenza vac high-dose quad (FLUZONE HD) 0.7 ML JOSE LUIS injection Inject 0.7 mLs into the muscle once for 1 dose       JACKI Luther CNP on 12/21/2022 at 5:44 PM

## 2022-12-21 NOTE — LETTER
"    12/21/2022        RE: [facility]  [address]  [address]  [place] [address]        [facility]  Chief Complaint   Patient presents with     RECHECK     Wellness Visit     [facility] Record Number:  [id number]  Place of Service where encounter took place:  [facility] [[id number]]    HPI:    Marie Connolly  is a 72 year old  (1950), who is being seen today for an annual comprehensive visit. HPI information obtained from: facility chart records, facility staff, patient report and [facility] chart review.  Today's concerns are:    Seen today for chronic disease management. No acute concerns from staff. Mostly redirectable, low dose Seroquel has been helpful. Left message for her son as wanted to discuss possible change to antidepressant. Denies sadness today. Wanted to \"give me a hug\" which is usual for her. Chronic left knee pain but denies today. Ambulates w/o assistive device.       ALLERGIES: Patient has no known allergies.  PAST MEDICAL HISTORY:  has a past medical history of Dementia in Alzheimer's disease (H) (11/30/2017), Essential hypertension (5/21/2021), Other disorders of pituitary gland (H) (12/27/2021), Other long term (current) drug therapy (12/27/2021), Other recurrent depressive disorders (H) (5/21/2021), Primary open angle glaucoma of both eyes, mild stage (12/27/2021), Pure hypercholesterolemia (12/27/2021), Right wrist pain (11/1/2021), Unspecified age-related cataract (12/27/2021), and Vitamin D deficiency (12/27/2021).  PAST SURGICAL HISTORY:  has a past surgical history that includes CATARACT REMOVAL (05/21/2021).  IMMUNIZATIONS:  Immunization History   Administered Date(s) Administered     COVID-19 Vaccine (Gisselle) 03/13/2021     COVID-19 Vaccine 12+ (Pfizer) 06/22/2022     COVID-19 Vaccine 18+ (Moderna) 11/23/2021     COVID-19 Vaccine Bivalent Booster 12+ (Pfizer) 11/17/2022     Flu 65+ Years " 01/31/2018     Influenza (High Dose) 3 valent vaccine 10/03/2016, 08/17/2020     Influenza (IIV3) PF 10/29/2010, 09/09/2011, 09/18/2012, 09/16/2013     Influenza Vaccine 65+ (Fluzone HD) 09/04/2020, 09/23/2021, 10/05/2022     Influenza Vaccine >6 months (Alfuria,Fluzone) 10/14/2014, 09/25/2015, 10/21/2019     Pneumo Conj 13-V (2010&after) 10/03/2016     Pneumococcal 23 valent 06/05/2018     TD (ADULT, 7+) 01/04/2007     Tdap (Adacel,Boostrix) 04/22/2016     Zoster vaccine, live 10/15/2012     Above immunizations pulled from [facility]. [facility] and facility records also reconciled. Outstanding information sent to  to update [facility].  Future immunizations needed:  yearly influenza per facility protocol    Current Outpatient Medications   Medication Sig Dispense Refill     acetaminophen (TYLENOL) 325 MG tablet Take 2 tablets (650 mg) by mouth 2 times daily. May also take 2 tablets (650 mg) 2 times daily as needed for pain. 150 tablet 11     amLODIPine (NORVASC) 2.5 MG tablet Take 1 tablet (2.5 mg) by mouth daily 30 tablet 11     atorvastatin (LIPITOR) 40 MG tablet Take 1 tablet (40 mg) by mouth daily 30 tablet 11     fenofibrate (TRICOR) 145 MG tablet Take 1 tablet (145 mg) by mouth daily 30 tablet 11     FLUoxetine (PROZAC) 10 MG capsule Take 1 capsule (10 mg) by mouth daily 30 capsule 11     memantine (NAMENDA) 10 MG tablet Take 1 tablet (10 mg) by mouth 2 times daily 60 tablet 11     Multiple Vitamins-Minerals (WOMENS MULTIVITAMIN) TABS Take 1 tablet by mouth daily 30 tablet 11     QUEtiapine (SEROQUEL) 25 MG tablet Take 0.5 tablets (12.5 mg) by mouth daily at 2 pm. May also take 0.5 tablets (12.5 mg) daily as needed (Agitation). 30 tablet 11     vitamin D3 (CHOLECALCIFEROL) 50 mcg (2000 units) tablet Take 1 tablet (50 mcg) by mouth daily 30 tablet 11     ROS:  Limited secondary to cognitive impairment but today pt reports fine     Vitals:  /67   Pulse (!) 9   Temp 97.8  F (36.6  C)    "Resp 16   Ht 1.626 m (5' 4\")   Wt 58.2 kg (128 lb 6.4 oz)   SpO2 98%   BMI 22.04 kg/m   Body mass index is 22.04 kg/m .  Exam:  GENERAL APPEARANCE:  Alert, in no distress, appears healthy  ENT:  Mouth and posterior oropharynx normal, moist mucous membranes  RESP:  respiratory effort and palpation of chest normal, lungs clear to auscultation   CV:  Palpation and auscultation of heart done , regular rate and rhythm, no murmur, rub, or gallop, trace edema  ABDOMEN:  normal bowel sounds, soft, nontender  M/S:   Gait and station w/o assistive device-limps at times (not today), ROM with both knees  SKIN:  Inspection of skin and subcutaneous tissue baseline  NEURO:   Cranial nerves 2-12 are normal tested and grossly at patient's baseline  PSYCH:  insight and judgement impaired, memory impaired     Lab/Diagnostic data:   CBC RESULTS: Recent Labs   Lab Test 02/17/22  0751 08/25/21  1458   WBC 4.6 5.9   RBC 4.63 4.50   HGB 14.1 14.7   HCT 45.3 43.7   MCV 98 97   MCH 30.5 32.7   MCHC 31.1* 33.6   RDW 13.5 13.7    199       Last Basic Metabolic Panel:  Recent Labs   Lab Test 02/17/22  0751 08/25/21  1458    140   POTASSIUM 3.6 4.2   CHLORIDE 110* 106   NERIS 9.6 9.2   CO2 26 29   BUN 12 9   CR 0.93 0.89   * 101*       Liver Function Studies -   Recent Labs   Lab Test 08/25/21  1458   PROTTOTAL 7.3   ALBUMIN 3.8   BILITOTAL 0.5   ALKPHOS 55   AST 34   ALT 40       TSH   Date Value Ref Range Status   08/25/2021 1.08 0.40 - 4.00 mU/L Final       No results found for: A1C    ASSESSMENT/PLAN  (G30.9,  F02.80) Dementia in Alzheimer's disease (H)  (R46.89) Aggressive behavior  (F43.21) Adjustment disorder with depressed mood  Comment: improved-no prn Seroquel for several months   Plan:   -continue current plan of care  -could consider discontinuing fluoxetine and starting Escitalopram      (I10) Essential hypertension  Comment: stable   Plan:   -continue current plan of care    (M25.562) Left knee pain, " "unspecified chronicity    Comment: ongoing-ortho eval at previous visit and noted bakers cyst posterior knee  Plan:  -tylenol 650 mg po BID and twice daily as needed  -consider joint injection if family agreeable and continues to limp  -consider voltaren BID?      (Z23) Influenza vaccine needed  Comment: clinic onsite but unclear if she received her vaccine this year  Plan:  -order for nursing to administer if not received       Electronically signed by:  JACKI Luther CNP     Annual Wellness Visit    Are you in the first 12 months of your Medicare Part B coverage?  No    Physical Health:    In general, how would you rate your overall physical health? fair    Outside of work, how many days during the week do you exercise?6-7 days/week    Outside of work, approximately how many minutes a day do you exercise?15-30 minutes    If you drink alcohol do you typically have >3 drinks per day or >7 drinks per week? No    Do you usually eat at least 4 servings of fruit and vegetables a day, include whole grains & fiber and avoid regularly eating high fat or \"junk\" foods? Yes    Do you have any problems taking medications regularly? No    Do you have any side effects from medications? none    Needs assistance for the following daily activities: telephone use, transportation, shopping, preparing meals, housework, bathing, laundry, money management and taking medicine    Which of the following safety concerns are present in your home?  none identified     Hearing impairment: No    In the past 6 months, have you been bothered by leaking of urine? no    Mental Health:    In general, how would you rate your overall mental or emotional health? fair      PHQ-2 Score:       PHQ-2 ( 1999 Pfizer) 12/21/2022 8/25/2021   Q1: Little interest or pleasure in doing things 0 0   Q2: Feeling down, depressed or hopeless 0 0   PHQ-2 Score 0 0   PHQ-2 Total Score (12-17 Years)- Positive if 3 or more points; Administer PHQ-A if positive - 0 "          Do you feel safe in your environment? Yes    Have you ever done Advance Care Planning? (For example, a Health Directive, POLST, or a discussion with a medical provider or your loved ones about your wishes)? Yes, advance care planning is on file.    Fall risk: none    Cognitive Screening: Mini Cog score of 2 dementia     Current providers sharing in care for this patient include: Patient Care Team:  Armaan Carr APRN CNP as PCP - General (Internal Medicine)  Iker Nath DO as Assigned Musculoskeletal Provider  ([facility]), [facility] - [facility] Drake as Other (see comments)  Armaan Carr APRN CNP as Assigned PCP    JACKI Luther CNP                          Sincerely,        JACKI Luther CNP

## 2022-12-22 DIAGNOSIS — I10 ESSENTIAL HYPERTENSION: ICD-10-CM

## 2022-12-22 DIAGNOSIS — F43.21 ADJUSTMENT DISORDER WITH DEPRESSED MOOD: ICD-10-CM

## 2022-12-22 DIAGNOSIS — F02.80 ALZHEIMER'S DEMENTIA WITHOUT BEHAVIORAL DISTURBANCE (H): ICD-10-CM

## 2022-12-22 DIAGNOSIS — E55.9 VITAMIN D DEFICIENCY: ICD-10-CM

## 2022-12-22 DIAGNOSIS — G30.9 ALZHEIMER'S DEMENTIA WITHOUT BEHAVIORAL DISTURBANCE (H): ICD-10-CM

## 2022-12-22 DIAGNOSIS — E78.2 MIXED HYPERLIPIDEMIA: ICD-10-CM

## 2022-12-22 PROBLEM — M17.0 PRIMARY OSTEOARTHRITIS OF BOTH KNEES: Status: ACTIVE | Noted: 2022-12-22

## 2022-12-22 PROBLEM — M25.562 LEFT KNEE PAIN, UNSPECIFIED CHRONICITY: Status: ACTIVE | Noted: 2022-12-22

## 2022-12-22 NOTE — TELEPHONE ENCOUNTER
** PLEASE LET [facility] KNOW ONCE APPROVED OR DENIED    Prior Authorization Retail Medication Request    Medication/Dose: DICLOFENAC GEL 1%; apply 2 g daily to both knees  ICD code (if different than what is on RX):  (M25.562) Left knee pain  Previously Tried and Failed:  N/A  Rationale:  Trying to avoid opioids in the elderly    Insurance Name:  BCBS Medicare Advantage  Insurance ID:  [id number]      Pharmacy Information (if different than what is on RX)  Name:  [facility]  Phone:  [phone]

## 2022-12-22 NOTE — TELEPHONE ENCOUNTER
Central Prior Authorization Team   Phone: [phone]      PA Initiation    Medication: DICLOFENAC GEL 1%  Insurance Company: Other (see comments)Comment:  Prime [phone]  Pharmacy Filling the Rx: [address]  Filling Pharmacy Phone: [phone]  Filling Pharmacy Fax:    Start Date: 12/22/2022

## 2022-12-22 NOTE — TELEPHONE ENCOUNTER
PRIOR AUTHORIZATION DENIED    Medication: DICLOFENAC GEL 1%-DENIED    Denial Date: 12/22/2022    Denial Rational:       Appeal Information:

## 2022-12-23 RX ORDER — FENOFIBRATE 145 MG/1
TABLET, COATED ORAL
Qty: 90 TABLET | Refills: 97 | Status: SHIPPED | OUTPATIENT
Start: 2022-12-23 | End: 2023-10-20

## 2022-12-23 RX ORDER — ATORVASTATIN CALCIUM 40 MG/1
TABLET, FILM COATED ORAL
Qty: 90 TABLET | Refills: 97 | Status: SHIPPED | OUTPATIENT
Start: 2022-12-23 | End: 2023-09-22

## 2022-12-23 RX ORDER — FLUOXETINE 10 MG/1
CAPSULE ORAL
Qty: 90 CAPSULE | Refills: 97 | Status: SHIPPED | OUTPATIENT
Start: 2022-12-23 | End: 2023-01-05

## 2022-12-23 RX ORDER — AMLODIPINE BESYLATE 2.5 MG/1
TABLET ORAL
Qty: 90 TABLET | Refills: 97 | Status: SHIPPED | OUTPATIENT
Start: 2022-12-23 | End: 2023-12-29

## 2022-12-23 RX ORDER — MEMANTINE HYDROCHLORIDE 10 MG/1
TABLET ORAL
Qty: 180 TABLET | Refills: 97 | Status: SHIPPED | OUTPATIENT
Start: 2022-12-23 | End: 2023-04-17

## 2022-12-28 ENCOUNTER — TELEPHONE (OUTPATIENT)
Dept: GERIATRICS | Facility: CLINIC | Age: 72
End: 2022-12-28

## 2022-12-28 NOTE — TELEPHONE ENCOUNTER
Prior Authorization Retail Medication Request    Medication/Dose: Diclofenac Gel 1%; 2 g to bilateral knees  ICD code (if different than what is on RX):  M17.0 Primary osteoarthritis of both knees; M25.562 Left knee pain  Previously Tried and Failed:  N/A  Rationale:  Trying to avoid opioids in the elderly    Insurance Name:  BCBS Medicare Advantage  Insurance ID:  [id number]      Pharmacy Information (if different than what is on RX)  Name:  [facility]  Phone:  [phone]

## 2022-12-29 NOTE — TELEPHONE ENCOUNTER
Central Prior Authorization Team  Phone: [phone]    PA Initiation    Medication: Diclofenac Gel 1%  Insurance Company: [employer] [place] - Phone [phone] Fax [phone]  Pharmacy Filling the Rx: [facility] - [address]  Filling Pharmacy Phone: [phone]  Filling Pharmacy Fax:    Start Date: 12/29/2022    Submitted pa with icd 10 code: M17.0 Primary osteoarthritis of both knees

## 2022-12-30 ENCOUNTER — TELEPHONE (OUTPATIENT)
Dept: GERIATRICS | Facility: CLINIC | Age: 72
End: 2022-12-30

## 2022-12-30 DIAGNOSIS — R19.7 DIARRHEA, UNSPECIFIED TYPE: Primary | ICD-10-CM

## 2022-12-30 DIAGNOSIS — K64.4 EXTERNAL HEMORRHOIDS: ICD-10-CM

## 2022-12-30 RX ORDER — LOPERAMIDE HYDROCHLORIDE 2 MG/1
TABLET ORAL
Qty: 15 TABLET | Refills: 3 | Status: SHIPPED | OUTPATIENT
Start: 2022-12-30 | End: 2023-01-11

## 2022-12-30 NOTE — TELEPHONE ENCOUNTER
Resident with loose stools.    Plan:    loperamide (IMODIUM A-D) 2 MG tablet Give 4 mg po after first loose stool and 2 mg po after each additional loose stool. Max 16mg/day     JACKI Luther CNP on 12/30/2022 at 9:16 AM

## 2023-01-03 NOTE — TELEPHONE ENCOUNTER
Prior Authorization Approval    Authorization Effective Date: 9/30/2022  Authorization Expiration Date: 12/29/2023  Medication: Diclofenac Gel 1%- APPROVED   Approved Dose/Quantity:   Reference #:     Insurance Company: [employer] [place] - Phone [phone] Fax [phone]  Expected CoPay:       CoPay Card Available:      Foundation Assistance Needed:    Which Pharmacy is filling the prescription (Not needed for infusion/clinic administered): [facility] - [address]  Pharmacy Notified: Yes  Patient Notified:  **Instructed pharmacy to notify patient when script is ready to /ship.**

## 2023-01-05 ENCOUNTER — TELEPHONE (OUTPATIENT)
Dept: GERIATRICS | Facility: CLINIC | Age: 73
End: 2023-01-05

## 2023-01-05 ENCOUNTER — TRANSFERRED RECORDS (OUTPATIENT)
Dept: HEALTH INFORMATION MANAGEMENT | Facility: CLINIC | Age: 73
End: 2023-01-05

## 2023-01-05 RX ORDER — HYDROCORTISONE 25 MG/G
CREAM TOPICAL 2 TIMES DAILY
Qty: 28 G | Refills: 1 | Status: SHIPPED | OUTPATIENT
Start: 2023-01-05 | End: 2023-03-13

## 2023-01-05 NOTE — TELEPHONE ENCOUNTER
Prior Authorization Retail Medication Request    Medication/Dose: PROCTOFOAM AER HC 1%; Apply 1 application per rectum twice daily (to external & internal hemorrhoids)  ICD code (if different than what is on RX):  K64.9 Hemorrhoid  Previously Tried and Failed:  Hydrocortisone 2.5% Cream  Rationale:  N/A    Insurance Name:  BCBS Medicare Advantage  Insurance ID:  [id number]      Pharmacy Information (if different than what is on RX)  Name:  [facility]  Phone:  [phone]

## 2023-01-06 DIAGNOSIS — K64.9 ACUTE HEMORRHOID: Primary | ICD-10-CM

## 2023-01-06 NOTE — PROGRESS NOTES
Abdominal x-ray 1/5  for diarrhea and noted with fecal statis. She also has approximately 4cm extremal hemorrhoid v prolapse with some mucus discharge.              Plan:  1. Enema x 1  2. Hold on loperamide  3. Continue hydrocortisone cream 2.5% BID  4. Proctofoam PA is pending  5. Apply granulated sugar to area daily prn for assistance with reducing swelling. (Reviewed with primary MD)      JACKI Luther CNP on 1/6/2023 at 8:59 AM

## 2023-01-09 NOTE — TELEPHONE ENCOUNTER
Central Prior Authorization Team   Phone: [phone]      PA Initiation    Medication: PROCTOFOAM AER HC 1%; 1 applicator BID  Insurance Company: Other (see comments)Comment:  Prime [phone]  Pharmacy Filling the Rx: [facility] - [place], [place] - [address] [place] [address]  Filling Pharmacy Phone: [phone]  Filling Pharmacy Fax:    Start Date: 1/9/2023

## 2023-01-10 NOTE — TELEPHONE ENCOUNTER
Prior Authorization Approval    Authorization Effective Date: 1/1/2023  Authorization Expiration Date: 1/10/2024  Medication: PROCTOFOAM AER HC 1%; 1 applicator BID-APPROVED  Approved Dose/Quantity:   Reference #:     Insurance Company: Other (see comments)Comment:  Prime [phone]  Expected CoPay:       CoPay Card Available:      Foundation Assistance Needed:    Which Pharmacy is filling the prescription (Not needed for infusion/clinic administered): [facility] - [address]  Pharmacy Notified: Yes  Patient Notified: No  **Instructed pharmacy to notify patient when script is ready to /ship.**

## 2023-01-11 ENCOUNTER — ASSISTED LIVING VISIT (OUTPATIENT)
Dept: GERIATRICS | Facility: CLINIC | Age: 73
End: 2023-01-11
Payer: COMMERCIAL

## 2023-01-11 DIAGNOSIS — K59.01 SLOW TRANSIT CONSTIPATION: ICD-10-CM

## 2023-01-11 DIAGNOSIS — F02.818 ALZHEIMER'S DEMENTIA WITH BEHAVIORAL DISTURBANCE (H): ICD-10-CM

## 2023-01-11 DIAGNOSIS — K64.9 ACUTE HEMORRHOID: Primary | ICD-10-CM

## 2023-01-11 DIAGNOSIS — G30.9 ALZHEIMER'S DEMENTIA WITH BEHAVIORAL DISTURBANCE (H): ICD-10-CM

## 2023-01-11 DIAGNOSIS — E46 PROTEIN-CALORIE MALNUTRITION, UNSPECIFIED SEVERITY (H): ICD-10-CM

## 2023-01-11 DIAGNOSIS — F33.8 OTHER RECURRENT DEPRESSIVE DISORDERS (H): ICD-10-CM

## 2023-01-11 PROCEDURE — 99349 HOME/RES VST EST MOD MDM 40: CPT | Performed by: NURSE PRACTITIONER

## 2023-01-11 RX ORDER — SODIUM PHOSPHATE,MONO-DIBASIC 19G-7G/118
1 ENEMA (ML) RECTAL DAILY PRN
COMMUNITY
Start: 2023-01-11 | End: 2024-03-29

## 2023-01-11 RX ORDER — POLYETHYLENE GLYCOL 3350 17 G/17G
1 POWDER, FOR SOLUTION ORAL
COMMUNITY
Start: 2023-01-11 | End: 2023-09-11

## 2023-01-11 NOTE — PROGRESS NOTES
[facility]  [facility] Record Number:  [id number]  Place of Service where encounter took place:  [facility] [[id number]]  Chief Complaint   Patient presents with     RECHECK     constipation       HPI:    Marie Connolly  is a 72 year old (1950), who is being seen today for an episodic care visit.  HPI information obtained from: facility staff. Today's concern is:    Resident with acute thrombosed external hemorrhoid, new constipation. See in her room she denies any issues. Has advanced dementia. Staff noticed hemorrhoid 1/5/23 by staff. It appears to be reducing with current topical measures and the pending proctofoam is now onsite. Abdominal x-ray showed diffuse colonic fecal stasis. She has been having loose stools but seems this is happening around a large fecal load. She was given an enema with good results of. Staff says stool with a rotting smell. No hx of irritable bowel syndrome or inflammatory bowel disease. Recent FIT test negative.     Past Medical and Surgical History reviewed in Epic today.    MEDICATIONS:    Current Outpatient Medications   Medication Sig Dispense Refill     polyethylene glycol (MIRALAX) 17 GM/Dose powder Take 17 g (1 capful) by mouth three times a week       Sodium Phosphates (ENEMA) 7-19 GM/118ML ENEM Place 1 enema rectally daily as needed (constipation)       acetaminophen (TYLENOL) 325 MG tablet Take 2 tablets (650 mg) by mouth 2 times daily. May also take 2 tablets (650 mg) 2 times daily as needed for pain. 150 tablet 11     amLODIPine (NORVASC) 2.5 MG tablet TAKE 1 TABLET BY MOUTH ONCE DAILY 90 tablet 97     atorvastatin (LIPITOR) 40 MG tablet TAKE 1 TABLET BY MOUTH ONCE DAILY 90 tablet 97     diclofenac (VOLTAREN) 1 % topical gel Apply 2 g topically daily Apply each morning to both knees. Keep in locked cabinet 100 g 3     DULoxetine (CYMBALTA) 20 MG capsule Take 1 capsule (20 mg) by mouth daily 30 capsule 3      "fenofibrate (TRICOR) 145 MG tablet TAKE 1 TABLET BY MOUTH ONCE DAILY 90 tablet 97     hydrocortisone, Perianal, (HYDROCORTISONE) 2.5 % cream Place rectally 2 times daily 28 g 1     hydrocortisone-pramoxine (PROCTOFOAM-HC) rectal foam Place 1 Applicatorful rectally 2 times daily Apply to extrernal and internal hemorrhoids 10 g 3     memantine (NAMENDA) 10 MG tablet TAKE 1 TABLET BY MOUTH TWICE DAILY 180 tablet 97     Multiple Vitamins-Minerals (ONE-A-DAY WITHIN) TABS TAKE 1 TABLET BY MOUTH ONCE DAILY 90 tablet 97     QUEtiapine (SEROQUEL) 25 MG tablet Take 0.5 tablets (12.5 mg) by mouth daily at 2 pm. May also take 0.5 tablets (12.5 mg) daily as needed (Agitation). 30 tablet 11     vitamin D3 (CHOLECALCIFEROL) 50 mcg (2000 units) tablet Take 1 tablet (50 mcg) by mouth daily 30 tablet 11     REVIEW OF SYSTEMS:  Limited secondary to cognitive impairment but today pt reports fine    Objective:  /70   Pulse 69   Temp 97.6  F (36.4  C)   Resp 16   Ht 1.626 m (5' 4\")   Wt 55.2 kg (121 lb 12.8 oz)   SpO2 93%   BMI 20.91 kg/m    Exam:  GENERAL APPEARANCE:  Alert, in no distress, appears healthy  ENT:  Mouth and posterior oropharynx normal, moist mucous membranes  RESP:  respiratory effort and palpation of chest normal, lungs clear to auscultation   CV:  Palpation and auscultation of heart done , regular rate and rhythm, no murmur, rub, or gallop, trace edema  ABDOMEN:  normal bowel sounds, soft, nontender  M/S:   Gait and station w/o assistive device-limps at times (not today), ROM with both knees  SKIN:  acute thrombosed external hemorrhoid left side of anus swollen, approximately 4cm length, 1.5 cm wide    NEURO:   Cranial nerves 2-12 are normal tested and grossly at patient's baseline  PSYCH:  insight and judgement impaired, memory impaired     Labs:   CBC RESULTS: Recent Labs   Lab Test 02/17/22  0751 08/25/21  1458   WBC 4.6 5.9   RBC 4.63 4.50   HGB 14.1 14.7   HCT 45.3 43.7   MCV 98 97   MCH 30.5 32.7 "   MCHC 31.1* 33.6   RDW 13.5 13.7    199       Last Basic Metabolic Panel:  Recent Labs   Lab Test 02/17/22  0751 08/25/21  1458    140   POTASSIUM 3.6 4.2   CHLORIDE 110* 106   NERIS 9.6 9.2   CO2 26 29   BUN 12 9   CR 0.93 0.89   * 101*       Liver Function Studies -   Recent Labs   Lab Test 08/25/21  1458   PROTTOTAL 7.3   ALBUMIN 3.8   BILITOTAL 0.5   ALKPHOS 55   AST 34   ALT 40       TSH   Date Value Ref Range Status   08/25/2021 1.08 0.40 - 4.00 mU/L Final       No results found for: A1C    ASSESSMENT/PLAN:  (K64.9) Acute hemorrhoid  (primary encounter diagnosis)  (K59.01) Slow transit constipation  Comment: hemorrhoid is reducing- needed another enema per staff  Plan:   -miralax started 3x weekly   -enema daily prn   -discontinue prn loperamide  -start procofoam-HC  -if foul smelling stool continues consider f/u with GI for eval    (G30.9,  F02.818) Alzheimer's dementia with behavioral disturbance (H)  (F33.8) Other recurrent depressive disorders (H)  Comment: resides in memory care  Plan:   -duloxetine 20 mg po daily   -seroquel daily and prn  -namenda 10 mg po BID    (E46) Protein-calorie malnutrition, unspecified severity (H)  Comment: 7 lbs weight loss in 1 month. Acute issues may be contributing   Plan:   -continue to follow weights  -consider Ensure if wt loss continues   -daily vitamin     Electronically signed by:  Armaan Carr, APRN CNP

## 2023-01-11 NOTE — LETTER
1/11/2023        RE: [facility]  [address]  [address]  [place] [address]        [facility]  [place] [facility] Record Number:  [id number]  Place of Service where encounter took place:  [facility] [[id number]]  Chief Complaint   Patient presents with     RECHECK     constipation       HPI:    Marie Connolly  is a 72 year old (1950), who is being seen today for an episodic care visit.  HPI information obtained from: facility staff. Today's concern is:    Resident with acute thrombosed external hemorrhoid, new constipation. See in her room she denies any issues. Has advanced dementia. Staff noticed hemorrhoid 1/5/23 by staff. It appears to be reducing with current topical measures and the pending proctofoam is now onsite. Abdominal x-ray showed diffuse colonic fecal stasis. She has been having loose stools but seems this is happening around a large fecal load. She was given an enema with good results of. Staff says stool with a rotting smell. No hx of irritable bowel syndrome or inflammatory bowel disease. Recent FIT test negative.     Past Medical and Surgical History reviewed in Epic today.    MEDICATIONS:    Current Outpatient Medications   Medication Sig Dispense Refill     polyethylene glycol (MIRALAX) 17 GM/Dose powder Take 17 g (1 capful) by mouth three times a week       Sodium Phosphates (ENEMA) 7-19 GM/118ML ENEM Place 1 enema rectally daily as needed (constipation)       acetaminophen (TYLENOL) 325 MG tablet Take 2 tablets (650 mg) by mouth 2 times daily. May also take 2 tablets (650 mg) 2 times daily as needed for pain. 150 tablet 11     amLODIPine (NORVASC) 2.5 MG tablet TAKE 1 TABLET BY MOUTH ONCE DAILY 90 tablet 97     atorvastatin (LIPITOR) 40 MG tablet TAKE 1 TABLET BY MOUTH ONCE DAILY 90 tablet 97     diclofenac (VOLTAREN) 1 % topical gel Apply 2 g topically daily Apply each morning to both knees. Keep in  "locked cabinet 100 g 3     DULoxetine (CYMBALTA) 20 MG capsule Take 1 capsule (20 mg) by mouth daily 30 capsule 3     fenofibrate (TRICOR) 145 MG tablet TAKE 1 TABLET BY MOUTH ONCE DAILY 90 tablet 97     hydrocortisone, Perianal, (HYDROCORTISONE) 2.5 % cream Place rectally 2 times daily 28 g 1     hydrocortisone-pramoxine (PROCTOFOAM-HC) rectal foam Place 1 Applicatorful rectally 2 times daily Apply to extrernal and internal hemorrhoids 10 g 3     memantine (NAMENDA) 10 MG tablet TAKE 1 TABLET BY MOUTH TWICE DAILY 180 tablet 97     Multiple Vitamins-Minerals (ONE-A-DAY WITHIN) TABS TAKE 1 TABLET BY MOUTH ONCE DAILY 90 tablet 97     QUEtiapine (SEROQUEL) 25 MG tablet Take 0.5 tablets (12.5 mg) by mouth daily at 2 pm. May also take 0.5 tablets (12.5 mg) daily as needed (Agitation). 30 tablet 11     vitamin D3 (CHOLECALCIFEROL) 50 mcg (2000 units) tablet Take 1 tablet (50 mcg) by mouth daily 30 tablet 11     REVIEW OF SYSTEMS:  Limited secondary to cognitive impairment but today pt reports fine    Objective:  /70   Pulse 69   Temp 97.6  F (36.4  C)   Resp 16   Ht 1.626 m (5' 4\")   Wt 55.2 kg (121 lb 12.8 oz)   SpO2 93%   BMI 20.91 kg/m    Exam:  GENERAL APPEARANCE:  Alert, in no distress, appears healthy  ENT:  Mouth and posterior oropharynx normal, moist mucous membranes  RESP:  respiratory effort and palpation of chest normal, lungs clear to auscultation   CV:  Palpation and auscultation of heart done , regular rate and rhythm, no murmur, rub, or gallop, trace edema  ABDOMEN:  normal bowel sounds, soft, nontender  M/S:   Gait and station w/o assistive device-limps at times (not today), ROM with both knees  SKIN:  acute thrombosed external hemorrhoid left side of anus swollen, approximately 4cm length, 1.5 cm wide    NEURO:   Cranial nerves 2-12 are normal tested and grossly at patient's baseline  PSYCH:  insight and judgement impaired, memory impaired     Labs:   CBC RESULTS: Recent Labs   Lab Test " 02/17/22  0751 08/25/21  1458   WBC 4.6 5.9   RBC 4.63 4.50   HGB 14.1 14.7   HCT 45.3 43.7   MCV 98 97   MCH 30.5 32.7   MCHC 31.1* 33.6   RDW 13.5 13.7    199       Last Basic Metabolic Panel:  Recent Labs   Lab Test 02/17/22  0751 08/25/21  1458    140   POTASSIUM 3.6 4.2   CHLORIDE 110* 106   NERIS 9.6 9.2   CO2 26 29   BUN 12 9   CR 0.93 0.89   * 101*       Liver Function Studies -   Recent Labs   Lab Test 08/25/21  1458   PROTTOTAL 7.3   ALBUMIN 3.8   BILITOTAL 0.5   ALKPHOS 55   AST 34   ALT 40       TSH   Date Value Ref Range Status   08/25/2021 1.08 0.40 - 4.00 mU/L Final       No results found for: A1C    ASSESSMENT/PLAN:  (K64.9) Acute hemorrhoid  (primary encounter diagnosis)  (K59.01) Slow transit constipation  Comment: hemorrhoid is reducing- needed another enema per staff  Plan:   -miralax started 3x weekly   -enema daily prn   -discontinue prn loperamide  -start procofoam-HC  -if foul smelling stool continues consider f/u with GI for eval    (G30.9,  F02.818) Alzheimer's dementia with behavioral disturbance (H)  (F33.8) Other recurrent depressive disorders (H)  Comment: resides in memory care  Plan:   -duloxetine 20 mg po daily   -seroquel daily and prn  -namenda 10 mg po BID    (E46) Protein-calorie malnutrition, unspecified severity (H)  Comment: 7 lbs weight loss in 1 month. Acute issues may be contributing   Plan:   -continue to follow weights  -consider Ensure if wt loss continues   -daily vitamin     Electronically signed by:  JACKI Luther CNP                 Sincerely,        JACKI Luther CNP

## 2023-01-12 VITALS
BODY MASS INDEX: 20.79 KG/M2 | HEART RATE: 69 BPM | RESPIRATION RATE: 16 BRPM | WEIGHT: 121.8 LBS | HEIGHT: 64 IN | TEMPERATURE: 97.6 F | DIASTOLIC BLOOD PRESSURE: 70 MMHG | SYSTOLIC BLOOD PRESSURE: 130 MMHG | OXYGEN SATURATION: 93 %

## 2023-01-12 PROBLEM — E46 PROTEIN-CALORIE MALNUTRITION (H): Status: ACTIVE | Noted: 2023-01-12

## 2023-01-20 DIAGNOSIS — E55.9 VITAMIN D DEFICIENCY: ICD-10-CM

## 2023-01-20 RX ORDER — CHOLECALCIFEROL (VITAMIN D3) 50 MCG
TABLET ORAL
Qty: 30 TABLET | Refills: 11 | Status: SHIPPED | OUTPATIENT
Start: 2023-01-20 | End: 2024-01-30

## 2023-02-15 ENCOUNTER — ASSISTED LIVING VISIT (OUTPATIENT)
Dept: GERIATRICS | Facility: CLINIC | Age: 73
End: 2023-02-15
Payer: COMMERCIAL

## 2023-02-15 VITALS
WEIGHT: 123.3 LBS | BODY MASS INDEX: 21.05 KG/M2 | TEMPERATURE: 96.5 F | OXYGEN SATURATION: 95 % | RESPIRATION RATE: 18 BRPM | HEIGHT: 64 IN | SYSTOLIC BLOOD PRESSURE: 132 MMHG | DIASTOLIC BLOOD PRESSURE: 70 MMHG | HEART RATE: 69 BPM

## 2023-02-15 DIAGNOSIS — F02.818 ALZHEIMER'S DEMENTIA WITH BEHAVIORAL DISTURBANCE (H): Primary | ICD-10-CM

## 2023-02-15 DIAGNOSIS — R46.89 AGGRESSIVE BEHAVIOR: ICD-10-CM

## 2023-02-15 DIAGNOSIS — F43.21 ADJUSTMENT DISORDER WITH DEPRESSED MOOD: ICD-10-CM

## 2023-02-15 DIAGNOSIS — M85.80 OSTEOPENIA, UNSPECIFIED LOCATION: ICD-10-CM

## 2023-02-15 DIAGNOSIS — G30.9 ALZHEIMER'S DEMENTIA WITH BEHAVIORAL DISTURBANCE (H): Primary | ICD-10-CM

## 2023-02-15 DIAGNOSIS — M17.0 PRIMARY OSTEOARTHRITIS OF BOTH KNEES: ICD-10-CM

## 2023-02-15 DIAGNOSIS — I10 ESSENTIAL HYPERTENSION: ICD-10-CM

## 2023-02-15 PROCEDURE — 99349 HOME/RES VST EST MOD MDM 40: CPT | Performed by: INTERNAL MEDICINE

## 2023-02-15 NOTE — LETTER
2/15/2023        RE: [facility]  [address]  [address]  [facility] [address]        Marie Connolly is a 72 year old female seen February 15, 2023 at [facility] unit where she has resided for one and a half years  (admit 8/2021)  Pt goes by Jarvis.   Pt is seen on the unit, much quieter than she was a year ago, and not as active   Smiles and reports she is feeling okay today, not able to give much other history.     AL staff reports pt is usually cooperative, but has been known to strike out with cares. Her family occ takes her on trips and pt returns more disoriented and agitated.      Last month pt was noted to have constipation requiring enemas and treatment for hemorrhoids     By chart review, pt has had cognitive decline over the past 12 years, followed by [facility]    First presented to her primary doctor in 2009 with self-reported memory loss, referred to Neurology in 2013, did Neuropsych testing in 2014    She failed a [employer] behind the wheel driving test in February 2020   Mini-cog in October 2020 was 0  She has been on donepezil and memantine for several years.       She is also treated for HTN, hypertriglyceridemia, OA and osteopenia.  She has not been hospitalized any time in the past 20 years    Past Medical History:   Diagnosis Date     Dementia in Alzheimer's disease (H) 11/30/2017     Essential hypertension 5/21/2021     Other disorders of pituitary gland (H) 12/27/2021     Other long term (current) drug therapy 12/27/2021     Other recurrent depressive disorders (H) 5/21/2021     Primary open angle glaucoma of both eyes, mild stage 12/27/2021     Pure hypercholesterolemia 12/27/2021     Right wrist pain 11/1/2021     Unspecified age-related cataract 12/27/2021     Vitamin D deficiency 12/27/2021       Past Surgical History:   Procedure Laterality Date     CATARACT REMOVAL  05/21/2021     SH:  Pt previously lived alone, townhouse [place] [facility]  [place] with services and family support  Two sons and 2 daughters, son Preston is first contact   Retired   Native of [facility].   Non smoker    ROS: ambulatory without device  Weight in 2021 was 130 lbs      Wt Readings from Last 5 Encounters:   02/15/23 55.9 kg (123 lb 4.8 oz)   01/11/23 55.2 kg (121 lb 12.8 oz)   12/21/22 58.2 kg (128 lb 6.4 oz)   09/28/22 58.2 kg (128 lb 3.2 oz)   07/27/22 58.1 kg (128 lb)      EXAM:   NAD  /70   Pulse 69   Temp (!) 96.5  F (35.8  C)   Resp 18   Ht 1.626 m (5' 4\")   Wt 55.9 kg (123 lb 4.8 oz)   SpO2 95%   BMI 21.16 kg/m     Neck supple without adenopathy  Lungs clear bilaterally with good air movement  Heart RRR s1s2, I/VI DAVION  Abd soft, NT, no distention or guarding, +BS  Ext without edema  Neuro: limited history, speech is normal but content confused.    Ambulates without device, dances  Psych: affect okay, smiling and pleasant.     Labs reviewed, unremarkable in Feb 2022       CT BRAIN wo CONTRAST  3/18/2021   INTRACRANIAL CONTENTS: No intracranial hemorrhage, extraaxial   collection, or mass effect.  No CT evidence of acute infarct.   Moderate presumed chronic small vessel ischemic changes. Moderate   generalized volume loss. No hydrocephalus.     ECHO 9/17/2021   Interpretation Summary  The visual ejection fraction is 60-65%. No regional wall motion abnormalities noted.  The right ventricle is normal in size and function. Right ventricular systolic  pressure could not be approximated due to inadequate tricuspid regurgitation.  There is moderate aortic sclerosis of the non-coronary cusp. The mean AoV  pressure gradient is 7.2 mmHg. No significant stenosis. There is mild (1+) aortic regurgitation.  The inferior vena cava was normal in size with preserved respiratory variability.  There is no pericardial effusion.      IMP/PLAN:    (I10) Essential hypertension   Comment:   BP Readings from Last 3 Encounters:   02/15/23 132/70   01/11/23 130/70   12/21/22 132/67 "      Plan: amlodipine 2.5 mg/day    (G30.9,  F02.818) Alzheimer's dementia with behavioral disturbance (H)   (R46.89) Aggressive behavior  Comment: early onset with gradual progression, sundowning    Plan: [facility] unit for assist with med admin, meals, activity, secure unit  Continue memantine 10 mg bid      Also on quetiapine 12.5 mg at 2 pm and has a PRN dose available for agitation     (F43.21) Adjustment disorder with depressed mood  Comment: recent medication change  Plan: now on duloxetine 20 mg/day     (E78.2) Mixed hyperlipidemia  Comment: no documentation of CV event, but does have SVID on imaging   Lab Results   Component Value Date    CHOL 215 02/17/2022      HDL 68 02/17/2022       02/17/2022      TRIG 119 02/17/2022     Plan: atorvastatin was increased to 40 mg/day after above lab.   Also on fenofibrate 145 mg/day    (M85.80) Osteopenia, unspecified location  Comment: remains ambulatory   Plan: vit D 50 mcg/day, dietary calcium.     (M17.0) Primary osteoarthritis of both knees  Comment: seen by JNoel for knee pain in May 2022  Plan: acetaminophen 650 mg bid and PRN      Lisandra Ronquillo MD         Sincerely,        Lisandra Ronquillo MD

## 2023-02-28 NOTE — PROGRESS NOTES
Marie Connolly is a 72 year old female seen February 15, 2023 at  Memory Care unit where she has resided for one and a half years  (admit 8/2021)  Pt goes by Jarvis.   Pt is seen on the unit, much quieter than she was a year ago, and not as active   Smiles and reports she is feeling okay today, not able to give much other history.     AL staff reports pt is usually cooperative, but has been known to strike out with cares. Her family occ takes her on trips and pt returns more disoriented and agitated.      Last month pt was noted to have constipation requiring enemas and treatment for hemorrhoids     By chart review, pt has had cognitive decline over the past 12 years, followed by [facility]    First presented to her primary doctor in 2009 with self-reported memory loss, referred to Neurology in 2013, did Neuropsych testing in 2014    She failed a [employer] behind the wheel driving test in February 2020   Mini-cog in October 2020 was 0  She has been on donepezil and memantine for several years.       She is also treated for HTN, hypertriglyceridemia, OA and osteopenia.  She has not been hospitalized any time in the past 20 years    Past Medical History:   Diagnosis Date     Dementia in Alzheimer's disease (H) 11/30/2017     Essential hypertension 5/21/2021     Other disorders of pituitary gland (H) 12/27/2021     Other long term (current) drug therapy 12/27/2021     Other recurrent depressive disorders (H) 5/21/2021     Primary open angle glaucoma of both eyes, mild stage 12/27/2021     Pure hypercholesterolemia 12/27/2021     Right wrist pain 11/1/2021     Unspecified age-related cataract 12/27/2021     Vitamin D deficiency 12/27/2021       Past Surgical History:   Procedure Laterality Date     CATARACT REMOVAL  05/21/2021     SH:  Pt previously lived alone, townhouse in [place] with services and family support  Two sons and 2 daughters, son Preston is first contact   Retired   Native of  "[place].   Non smoker    ROS: ambulatory without device  Weight in 2021 was 130 lbs      Wt Readings from Last 5 Encounters:   02/15/23 55.9 kg (123 lb 4.8 oz)   01/11/23 55.2 kg (121 lb 12.8 oz)   12/21/22 58.2 kg (128 lb 6.4 oz)   09/28/22 58.2 kg (128 lb 3.2 oz)   07/27/22 58.1 kg (128 lb)      EXAM:   NAD  /70   Pulse 69   Temp (!) 96.5  F (35.8  C)   Resp 18   Ht 1.626 m (5' 4\")   Wt 55.9 kg (123 lb 4.8 oz)   SpO2 95%   BMI 21.16 kg/m     Neck supple without adenopathy  Lungs clear bilaterally with good air movement  Heart RRR s1s2, I/VI DAVION  Abd soft, NT, no distention or guarding, +BS  Ext without edema  Neuro: limited history, speech is normal but content confused.    Ambulates without device, dances  Psych: affect okay, smiling and pleasant.     Labs reviewed, unremarkable in Feb 2022       CT BRAIN wo CONTRAST  3/18/2021   INTRACRANIAL CONTENTS: No intracranial hemorrhage, extraaxial   collection, or mass effect.  No CT evidence of acute infarct.   Moderate presumed chronic small vessel ischemic changes. Moderate   generalized volume loss. No hydrocephalus.     ECHO 9/17/2021   Interpretation Summary  The visual ejection fraction is 60-65%. No regional wall motion abnormalities noted.  The right ventricle is normal in size and function. Right ventricular systolic  pressure could not be approximated due to inadequate tricuspid regurgitation.  There is moderate aortic sclerosis of the non-coronary cusp. The mean AoV  pressure gradient is 7.2 mmHg. No significant stenosis. There is mild (1+) aortic regurgitation.  The inferior vena cava was normal in size with preserved respiratory variability.  There is no pericardial effusion.      IMP/PLAN:    (I10) Essential hypertension   Comment:   BP Readings from Last 3 Encounters:   02/15/23 132/70   01/11/23 130/70   12/21/22 132/67      Plan: amlodipine 2.5 mg/day    (G30.9,  F02.818) Alzheimer's dementia with behavioral disturbance (H)   (R46.89) " Aggressive behavior  Comment: early onset with gradual progression, sundowning    Plan: [facility] unit for assist with med admin, meals, activity, secure unit  Continue memantine 10 mg bid      Also on quetiapine 12.5 mg at 2 pm and has a PRN dose available for agitation     (F43.21) Adjustment disorder with depressed mood  Comment: recent medication change  Plan: now on duloxetine 20 mg/day     (E78.2) Mixed hyperlipidemia  Comment: no documentation of CV event, but does have SVID on imaging   Lab Results   Component Value Date    CHOL 215 02/17/2022      HDL 68 02/17/2022       02/17/2022      TRIG 119 02/17/2022     Plan: atorvastatin was increased to 40 mg/day after above lab.   Also on fenofibrate 145 mg/day    (M85.80) Osteopenia, unspecified location  Comment: remains ambulatory   Plan: vit D 50 mcg/day, dietary calcium.     (M17.0) Primary osteoarthritis of both knees  Comment: seen by JNoel for knee pain in May 2022  Plan: acetaminophen 650 mg bid and PRN      Lisandra Ronquillo MD

## 2023-03-08 NOTE — PROGRESS NOTES
Resolving external hemorrhoid    1. Discontinue proctofoam- HC  2. Continue hydrocortisone topically to area until resolved then may use prn     JACKI Luther CNP on 3/8/2023 at 1:06 PM

## 2023-03-12 DIAGNOSIS — G30.9 ALZHEIMER'S DEMENTIA WITH BEHAVIORAL DISTURBANCE (H): ICD-10-CM

## 2023-03-12 DIAGNOSIS — F02.818 ALZHEIMER'S DEMENTIA WITH BEHAVIORAL DISTURBANCE (H): ICD-10-CM

## 2023-03-12 DIAGNOSIS — K64.4 EXTERNAL HEMORRHOIDS: ICD-10-CM

## 2023-03-13 RX ORDER — QUETIAPINE FUMARATE 25 MG/1
TABLET, FILM COATED ORAL
Qty: 14 TABLET | Refills: 11 | Status: SHIPPED | OUTPATIENT
Start: 2023-03-13 | End: 2023-04-04

## 2023-03-13 RX ORDER — HYDROCORTISONE 25 MG/G
CREAM TOPICAL
Qty: 30 G | Refills: 97 | Status: SHIPPED | OUTPATIENT
Start: 2023-03-13 | End: 2023-03-21

## 2023-03-19 DIAGNOSIS — F43.21 ADJUSTMENT DISORDER WITH DEPRESSED MOOD: ICD-10-CM

## 2023-03-19 DIAGNOSIS — M25.562 LEFT KNEE PAIN, UNSPECIFIED CHRONICITY: ICD-10-CM

## 2023-03-20 RX ORDER — DULOXETIN HYDROCHLORIDE 20 MG/1
CAPSULE, DELAYED RELEASE ORAL
Qty: 90 CAPSULE | Refills: 97 | Status: SHIPPED | OUTPATIENT
Start: 2023-03-20 | End: 2023-06-28

## 2023-03-20 RX ORDER — ACETAMINOPHEN 325 MG/1
TABLET ORAL
Qty: 720 TABLET | Refills: 97 | Status: SHIPPED | OUTPATIENT
Start: 2023-03-20 | End: 2024-03-26

## 2023-03-21 DIAGNOSIS — K64.4 EXTERNAL HEMORRHOIDS: ICD-10-CM

## 2023-03-21 RX ORDER — HYDROCORTISONE 25 MG/G
CREAM TOPICAL
Qty: 30 G | Refills: 11 | Status: SHIPPED | OUTPATIENT
Start: 2023-03-21 | End: 2023-05-03

## 2023-04-04 ENCOUNTER — TELEPHONE (OUTPATIENT)
Dept: GERIATRICS | Facility: CLINIC | Age: 73
End: 2023-04-04
Payer: COMMERCIAL

## 2023-04-04 DIAGNOSIS — F02.818 ALZHEIMER'S DEMENTIA WITH BEHAVIORAL DISTURBANCE (H): ICD-10-CM

## 2023-04-04 DIAGNOSIS — G30.9 ALZHEIMER'S DEMENTIA WITH BEHAVIORAL DISTURBANCE (H): ICD-10-CM

## 2023-04-04 RX ORDER — QUETIAPINE FUMARATE 25 MG/1
25 TABLET, FILM COATED ORAL DAILY
Qty: 14 TABLET | Refills: 11 | Status: SHIPPED | OUTPATIENT
Start: 2023-04-04 | End: 2023-04-05

## 2023-04-04 NOTE — TELEPHONE ENCOUNTER
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 Resident was started on Seroquel 12.5 mg po once daily scheduled at 2:15pm on 3/13/23 as most episodes happen in the afternoon. Now with increased documented episodes and current dose not effective. One recent episode listed below from facility charts:        ASSESSMENT/PLAN      Increase Seroquel to 25 mg po once daily at 2:15pm. May also have 25 mg po daily prn     Reviewed changes with family who verbalized agreement to increased medication dose. Reviewed S/E of medication.       Electronically signed by:   JACKI Luther CNP

## 2023-04-05 RX ORDER — QUETIAPINE FUMARATE 25 MG/1
25 TABLET, FILM COATED ORAL DAILY
Qty: 14 TABLET | Refills: 11 | Status: SHIPPED | OUTPATIENT
Start: 2023-04-05 | End: 2023-04-17

## 2023-04-17 DIAGNOSIS — F02.818 ALZHEIMER'S DEMENTIA WITH BEHAVIORAL DISTURBANCE (H): ICD-10-CM

## 2023-04-17 DIAGNOSIS — G30.9 ALZHEIMER'S DEMENTIA WITH BEHAVIORAL DISTURBANCE (H): ICD-10-CM

## 2023-04-17 RX ORDER — QUETIAPINE FUMARATE 25 MG/1
25 TABLET, FILM COATED ORAL 2 TIMES DAILY
Qty: 14 TABLET | Refills: 11 | Status: SHIPPED | OUTPATIENT
Start: 2023-04-17 | End: 2023-04-21

## 2023-04-17 NOTE — PROGRESS NOTES
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 Her Seroquel given once daily scheduled was increased earlier this month but with today's incident could need increase of frequency. Facility notes below of episode:           ASSESSMENT/PLAN      Increase Seroquel to 25 mg po BID. Please give in morning and continue afternoon dose. May also have 25 mg po daily at needed.     Reviewed risks of medication use with her son today    Discontinue Namenda as can contribute to aggression       Electronically signed by:   JACKI Luther CNP

## 2023-04-21 DIAGNOSIS — F02.818 ALZHEIMER'S DEMENTIA WITH BEHAVIORAL DISTURBANCE (H): ICD-10-CM

## 2023-04-21 DIAGNOSIS — G30.9 ALZHEIMER'S DEMENTIA WITH BEHAVIORAL DISTURBANCE (H): ICD-10-CM

## 2023-04-23 RX ORDER — QUETIAPINE FUMARATE 25 MG/1
TABLET, FILM COATED ORAL
Qty: 270 TABLET | Refills: 3 | Status: SHIPPED | OUTPATIENT
Start: 2023-04-23 | End: 2023-06-28

## 2023-05-02 NOTE — PROGRESS NOTES
[facility]  [place] Medical Record Number:  [id number]  Place of Service where encounter took place:  [facility] [[id number]]  Chief Complaint   Patient presents with     RECHECK     Behaviors       HPI:    Marie Connolly  is a 72 year old (1950), who is being seen today for an episodic care visit.  HPI information obtained from: facility chart records, facility staff, patient report and [facility] chart review. Today's concern is:    Seen today for follow up to aggressive outbursts and behaviors. Staff says some improvement with BID Seroquel. She will become physically aggressive with little warning.    Also less interested in eating. She will move food around her plate then leave for her apartment. Staff brings her back to table but poor oral intake is noted. Weight is stable. She denies any pain in her mouth, abdominal pain, constipation.     Past Medical and Surgical History reviewed in Epic today.    MEDICATIONS:    Current Outpatient Medications   Medication Sig Dispense Refill     hydrocortisone, Perianal, (ANUSOL-HC) 2.5 % cream PLACE RECTALLY TWICE DAILY NEEDED 30 g 11     acetaminophen (TYLENOL) 325 MG tablet TAKE TWO TABLETS (650MG) BY MOUTH TWICE DAILY;MAY ALSO TAKE TWO TABLETS (650MG) BY MOUTH TWICE DAILY AS NEEDED 720 tablet 97     amLODIPine (NORVASC) 2.5 MG tablet TAKE 1 TABLET BY MOUTH ONCE DAILY 90 tablet 97     atorvastatin (LIPITOR) 40 MG tablet TAKE 1 TABLET BY MOUTH ONCE DAILY 90 tablet 97     diclofenac (VOLTAREN) 1 % topical gel Apply 2 g topically daily Apply each morning to both knees. Keep in locked cabinet 100 g 3     DULoxetine (CYMBALTA) 20 MG capsule TAKE 1 CAPSULE BY MOUTH ONCE DAILY 90 capsule 97     fenofibrate (TRICOR) 145 MG tablet TAKE 1 TABLET BY MOUTH ONCE DAILY 90 tablet 97     Multiple Vitamins-Minerals (ONE-A-DAY WITHIN) TABS TAKE 1 TABLET BY MOUTH ONCE DAILY 90 tablet 97     polyethylene glycol (MIRALAX) 17 GM/Dose  "powder Take 17 g (1 capful) by mouth three times a week       QUEtiapine (SEROQUEL) 25 MG tablet TAKE 1 TABLET BY MOUTH TWICE DAILY;TAKE 1 TABLET BY MOUTH ONCE DAILY AS NEEDED 270 tablet 3     Sodium Phosphates (ENEMA) 7-19 GM/118ML ENEM Place 1 enema rectally daily as needed (constipation)       VITAMIN D3 50 MCG (2000 UT) tablet TAKE 1 TABLET BY MOUTH ONCE DAILY 30 tablet 11     REVIEW OF SYSTEMS:  Limited secondary to cognitive impairment but today pt reports ok    Objective:  /86   Pulse 69   Temp 97.5  F (36.4  C)   Resp 18   Ht 1.626 m (5' 4\")   Wt 56.3 kg (124 lb 3.2 oz)   SpO2 98%   BMI 21.32 kg/m    Exam:  GENERAL APPEARANCE:  Alert, in no distress, appears healthy but thinner, less muscle?   ENT:  Mouth and posterior oropharynx normal, moist mucous membranes  RESP:  respiratory effort and palpation of chest normal, lungs clear to auscultation   CV:  Palpation and auscultation of heart done , regular rate and rhythm, no murmur, rub, or gallop, trace edema  ABDOMEN:  normal bowel sounds, soft, nontender  M/S:   Gait and station w/o assistive device-limps at times (not today), ROM with both knees  SKIN: intact to visualized areas   NEURO:   Cranial nerves 2-12 are normal tested and grossly at patient's baseline  PSYCH:  insight and judgement impaired, memory impaired        Labs:   CBC RESULTS: Recent Labs   Lab Test 02/17/22  0751 08/25/21  1458   WBC 4.6 5.9   RBC 4.63 4.50   HGB 14.1 14.7   HCT 45.3 43.7   MCV 98 97   MCH 30.5 32.7   MCHC 31.1* 33.6   RDW 13.5 13.7    199       Last Basic Metabolic Panel:  Recent Labs   Lab Test 02/17/22  0751 08/25/21  1458    140   POTASSIUM 3.6 4.2   CHLORIDE 110* 106   NERIS 9.6 9.2   CO2 26 29   BUN 12 9   CR 0.93 0.89   * 101*       Liver Function Studies -   Recent Labs   Lab Test 08/25/21  1458   PROTTOTAL 7.3   ALBUMIN 3.8   BILITOTAL 0.5   ALKPHOS 55   AST 34   ALT 40       TSH   Date Value Ref Range Status   08/25/2021 1.08 0.40 - " 4.00 mU/L Final       No results found for: A1C    ASSESSMENT/PLAN:  (G30.9,  F02.818) Alzheimer's dementia with behavioral disturbance (H)  (F33.8) Other recurrent depressive disorders (H)  Comment: resides in memory care  Plan:   -duloxetine 20 mg po daily   -seroquel increased to 25 mg po BID    (E46) Protein-calorie malnutrition, unspecified severity (H)  Comment: loss of interest in food, may need assistance with eating if she would allow but doubtful   Plan:   -continue to follow weights  -Ensure or Boost twice daily between meals. Start when family supplies. Staff to monitor usage as may need to reduce based on  tolerance    -daily vitamin     (K64.) External hemorrhoid    (K59.01) Slow transit constipation  Comment: hemorrhoid is reducing and mostly resolved  Plan:   -miralax 3x weekly   -enema daily prn   -discontinue prn loperamide  -Anusol to twice daily as needed               Electronically signed by:  JACKI Luther CNP

## 2023-05-03 ENCOUNTER — ASSISTED LIVING VISIT (OUTPATIENT)
Dept: GERIATRICS | Facility: CLINIC | Age: 73
End: 2023-05-03
Payer: COMMERCIAL

## 2023-05-03 VITALS
WEIGHT: 124.2 LBS | HEIGHT: 64 IN | BODY MASS INDEX: 21.21 KG/M2 | SYSTOLIC BLOOD PRESSURE: 138 MMHG | TEMPERATURE: 97.5 F | HEART RATE: 69 BPM | DIASTOLIC BLOOD PRESSURE: 86 MMHG | OXYGEN SATURATION: 98 % | RESPIRATION RATE: 18 BRPM

## 2023-05-03 DIAGNOSIS — K59.01 SLOW TRANSIT CONSTIPATION: ICD-10-CM

## 2023-05-03 DIAGNOSIS — E46 PROTEIN-CALORIE MALNUTRITION, UNSPECIFIED SEVERITY (H): ICD-10-CM

## 2023-05-03 DIAGNOSIS — G30.9 ALZHEIMER'S DEMENTIA WITH BEHAVIORAL DISTURBANCE (H): Primary | ICD-10-CM

## 2023-05-03 DIAGNOSIS — F02.818 ALZHEIMER'S DEMENTIA WITH BEHAVIORAL DISTURBANCE (H): Primary | ICD-10-CM

## 2023-05-03 DIAGNOSIS — F33.8 OTHER RECURRENT DEPRESSIVE DISORDERS (H): ICD-10-CM

## 2023-05-03 DIAGNOSIS — K64.4 EXTERNAL HEMORRHOIDS: ICD-10-CM

## 2023-05-03 PROCEDURE — 99349 HOME/RES VST EST MOD MDM 40: CPT | Performed by: NURSE PRACTITIONER

## 2023-05-03 RX ORDER — HYDROCORTISONE 25 MG/G
CREAM TOPICAL
Qty: 30 G | Refills: 11 | COMMUNITY
Start: 2023-05-03 | End: 2024-07-05

## 2023-05-03 NOTE — LETTER
Marie Connolly orders    -Ensure or Boost twice daily between meals. Start when family supplies. Staff to monitor usage as may need to reduce based on  tolerance      -Anusol to twice daily as needed    -move lipitor to        JACKI Luther CNP on 5/3/2023 at 4:16 PM

## 2023-05-03 NOTE — LETTER
5/3/2023        RE: [facility]  [address]  [address]  [place] [address]        [facility]  [place] Medical Record Number:  [id number]  Place of Service where encounter took place:  [facility] [[id number]]  Chief Complaint   Patient presents with     RECHECK     Behaviors       HPI:    Marie Connolly  is a 72 year old (1950), who is being seen today for an episodic care visit.  HPI information obtained from: facility chart records, facility staff, patient report and [facility] chart review. Today's concern is:    Seen today for follow up to aggressive outbursts and behaviors. Staff says some improvement with BID Seroquel. She will become physically aggressive with little warning.    Also less interested in eating. She will move food around her plate then leave for her apartment. Staff brings her back to table but poor oral intake is noted. Weight is stable. She denies any pain in her mouth, abdominal pain, constipation.     Past Medical and Surgical History reviewed in Epic today.    MEDICATIONS:    Current Outpatient Medications   Medication Sig Dispense Refill     hydrocortisone, Perianal, (ANUSOL-HC) 2.5 % cream PLACE RECTALLY TWICE DAILY NEEDED 30 g 11     acetaminophen (TYLENOL) 325 MG tablet TAKE TWO TABLETS (650MG) BY MOUTH TWICE DAILY;MAY ALSO TAKE TWO TABLETS (650MG) BY MOUTH TWICE DAILY AS NEEDED 720 tablet 97     amLODIPine (NORVASC) 2.5 MG tablet TAKE 1 TABLET BY MOUTH ONCE DAILY 90 tablet 97     atorvastatin (LIPITOR) 40 MG tablet TAKE 1 TABLET BY MOUTH ONCE DAILY 90 tablet 97     diclofenac (VOLTAREN) 1 % topical gel Apply 2 g topically daily Apply each morning to both knees. Keep in locked cabinet 100 g 3     DULoxetine (CYMBALTA) 20 MG capsule TAKE 1 CAPSULE BY MOUTH ONCE DAILY 90 capsule 97     fenofibrate (TRICOR) 145 MG tablet TAKE 1 TABLET BY MOUTH ONCE DAILY 90 tablet 97     Multiple  "Vitamins-Minerals (ONE-A-DAY WITHIN) TABS TAKE 1 TABLET BY MOUTH ONCE DAILY 90 tablet 97     polyethylene glycol (MIRALAX) 17 GM/Dose powder Take 17 g (1 capful) by mouth three times a week       QUEtiapine (SEROQUEL) 25 MG tablet TAKE 1 TABLET BY MOUTH TWICE DAILY;TAKE 1 TABLET BY MOUTH ONCE DAILY AS NEEDED 270 tablet 3     Sodium Phosphates (ENEMA) 7-19 GM/118ML ENEM Place 1 enema rectally daily as needed (constipation)       VITAMIN D3 50 MCG (2000 UT) tablet TAKE 1 TABLET BY MOUTH ONCE DAILY 30 tablet 11     REVIEW OF SYSTEMS:  Limited secondary to cognitive impairment but today pt reports ok    Objective:  /86   Pulse 69   Temp 97.5  F (36.4  C)   Resp 18   Ht 1.626 m (5' 4\")   Wt 56.3 kg (124 lb 3.2 oz)   SpO2 98%   BMI 21.32 kg/m    Exam:  GENERAL APPEARANCE:  Alert, in no distress, appears healthy but thinner, less muscle?   ENT:  Mouth and posterior oropharynx normal, moist mucous membranes  RESP:  respiratory effort and palpation of chest normal, lungs clear to auscultation   CV:  Palpation and auscultation of heart done , regular rate and rhythm, no murmur, rub, or gallop, trace edema  ABDOMEN:  normal bowel sounds, soft, nontender  M/S:   Gait and station w/o assistive device-limps at times (not today), ROM with both knees  SKIN: intact to visualized areas   NEURO:   Cranial nerves 2-12 are normal tested and grossly at patient's baseline  PSYCH:  insight and judgement impaired, memory impaired        Labs:   CBC RESULTS: Recent Labs   Lab Test 02/17/22  0751 08/25/21  1458   WBC 4.6 5.9   RBC 4.63 4.50   HGB 14.1 14.7   HCT 45.3 43.7   MCV 98 97   MCH 30.5 32.7   MCHC 31.1* 33.6   RDW 13.5 13.7    199       Last Basic Metabolic Panel:  Recent Labs   Lab Test 02/17/22  0751 08/25/21  1458    140   POTASSIUM 3.6 4.2   CHLORIDE 110* 106   NERIS 9.6 9.2   CO2 26 29   BUN 12 9   CR 0.93 0.89   * 101*       Liver Function Studies -   Recent Labs   Lab Test 08/25/21  1458 "   PROTTOTAL 7.3   ALBUMIN 3.8   BILITOTAL 0.5   ALKPHOS 55   AST 34   ALT 40       TSH   Date Value Ref Range Status   08/25/2021 1.08 0.40 - 4.00 mU/L Final       No results found for: A1C    ASSESSMENT/PLAN:  (G30.9,  F02.818) Alzheimer's dementia with behavioral disturbance (H)  (F33.8) Other recurrent depressive disorders (H)  Comment: resides in memory care  Plan:   -duloxetine 20 mg po daily   -seroquel increased to 25 mg po BID    (E46) Protein-calorie malnutrition, unspecified severity (H)  Comment: loss of interest in food, may need assistance with eating if she would allow but doubtful   Plan:   -continue to follow weights  -Ensure or Boost twice daily between meals. Start when family supplies. Staff to monitor usage as may need to reduce based on  tolerance    -daily vitamin     (K64.) External hemorrhoid    (K59.01) Slow transit constipation  Comment: hemorrhoid is reducing and mostly resolved  Plan:   -miralax 3x weekly   -enema daily prn   -discontinue prn loperamide  -Anusol to twice daily as needed               Electronically signed by:  JACKI Luther CNP               Sincerely,        JACKI Luther CNP

## 2023-05-19 ENCOUNTER — TRANSFERRED RECORDS (OUTPATIENT)
Dept: HEALTH INFORMATION MANAGEMENT | Facility: CLINIC | Age: 73
End: 2023-05-19
Payer: COMMERCIAL

## 2023-06-27 NOTE — PROGRESS NOTES
[facility]  [facility] Record Number:  [id number]  Place of Service where encounter took place:  [facility] [[id number]]  Chief Complaint   Patient presents with     Nursing Home Acute     Foot blisters       HPI:    Marie Connolly  is a 72 year old (1950), who is being seen today for an episodic care visit.  HPI information obtained from: facility chart records, facility staff and family/first contact son report. Today's concern is:    Seen today for follow up to blisters on feet, increased agitation, isolating more in room. Behaviors noted by family and staff. Has been going on for a few weeks. She is refusing care, prn Seroquel used twice last month in addition to her scheduled doses.  Her son doesn't think the Seroquel is effective anymore. She is happy one minute then aggressive verbally and physically with staff and other residents.  Seen in her room, she is laying on her bed reading the paper. Denies any acute concerns but then says has lower abdominal pain. She is not eating as much lately and does have a history of bowel constipation, hemorrhoids.,    Great toes and little toes are red. No skin breakdown. She refuses to wear socks with her shoes and the skin rubs again the footwear.     Past Medical and Surgical History reviewed in Epic today.    MEDICATIONS:    Current Outpatient Medications   Medication Sig Dispense Refill     DULoxetine (CYMBALTA) 30 MG capsule Take 1 capsule (30 mg) by mouth daily 30 capsule 4     OLANZapine (ZYPREXA) 2.5 MG tablet Take 1 tablet (2.5 mg) by mouth 2 times daily 30 tablet 3     SENNA-docusate sodium (SENNA S) 8.6-50 MG tablet Take 1 tablet by mouth At Bedtime 1 tab po BID prn 30 tablet 3     acetaminophen (TYLENOL) 325 MG tablet TAKE TWO TABLETS (650MG) BY MOUTH TWICE DAILY;MAY ALSO TAKE TWO TABLETS (650MG) BY MOUTH TWICE DAILY AS NEEDED 720 tablet 97     amLODIPine (NORVASC) 2.5 MG tablet TAKE 1 TABLET BY MOUTH  "ONCE DAILY 90 tablet 97     atorvastatin (LIPITOR) 40 MG tablet TAKE 1 TABLET BY MOUTH ONCE DAILY 90 tablet 97     diclofenac (VOLTAREN) 1 % topical gel Apply 2 g topically daily Apply each morning to both knees. Keep in locked cabinet 100 g 3     fenofibrate (TRICOR) 145 MG tablet TAKE 1 TABLET BY MOUTH ONCE DAILY 90 tablet 97     hydrocortisone, Perianal, (ANUSOL-HC) 2.5 % cream PLACE RECTALLY TWICE DAILY NEEDED 30 g 11     Multiple Vitamins-Minerals (ONE-A-DAY WITHIN) TABS TAKE 1 TABLET BY MOUTH ONCE DAILY 90 tablet 97     polyethylene glycol (MIRALAX) 17 GM/Dose powder Take 17 g (1 capful) by mouth three times a week       Sodium Phosphates (ENEMA) 7-19 GM/118ML ENEM Place 1 enema rectally daily as needed (constipation)       VITAMIN D3 50 MCG (2000 UT) tablet TAKE 1 TABLET BY MOUTH ONCE DAILY 30 tablet 11     REVIEW OF SYSTEMS:  Limited secondary to cognitive impairment but today pt reports ok    Objective:  /85   Pulse 74   Temp 97.2  F (36.2  C)   Resp 18   Ht 1.626 m (5' 4\")   Wt 56.3 kg (124 lb 3.2 oz)   SpO2 99%   BMI 21.32 kg/m    Exam:  GENERAL APPEARANCE:  Alert, in no distress, appears healthy  ENT:  Mouth and posterior oropharynx normal, moist mucous membranes  RESP:  respiratory effort and palpation of chest normal, lungs clear to auscultation   CV:  Palpation and auscultation of heart done , regular rate and rhythm, no murmur, rub, or gallop, trace edema  ABDOMEN:  hypoactive bowel sounds, soft, nontender  M/S:   Gait and station w/o assistive device-limps at times (not today), ROM with both knees  SKIN:  acute thrombosed external hemorrhoid left side of anus swollen, approximately 4cm length, 1.5 cm wide    NEURO:   Cranial nerves 2-12 are normal tested and grossly at patient's baseline  PSYCH:  insight and judgement impaired, memory impaired     Labs:   CBC RESULTS: Recent Labs   Lab Test 02/17/22  0751 08/25/21  1458   WBC 4.6 5.9   RBC 4.63 4.50   HGB 14.1 14.7   HCT 45.3 43.7   MCV " 98 97   MCH 30.5 32.7   MCHC 31.1* 33.6   RDW 13.5 13.7    199       Last Basic Metabolic Panel:  Recent Labs   Lab Test 02/17/22  0751 08/25/21  1458    140   POTASSIUM 3.6 4.2   CHLORIDE 110* 106   NERIS 9.6 9.2   CO2 26 29   BUN 12 9   CR 0.93 0.89   * 101*       Liver Function Studies -   Recent Labs   Lab Test 08/25/21  1458   PROTTOTAL 7.3   ALBUMIN 3.8   BILITOTAL 0.5   ALKPHOS 55   AST 34   ALT 40       TSH   Date Value Ref Range Status   08/25/2021 1.08 0.40 - 4.00 mU/L Final       No results found for: A1C    ASSESSMENT/PLAN:  (G30.9,  F02.818) Alzheimer's dementia with behavioral disturbance (H)  (primary encounter diagnosis)  (F33.8) Other recurrent depressive disorders (H)  (F43.21) Adjustment disorder with depressed mood  (R46.89) Aggressive behavior  Comment: ongoing   Plan:   -discontinue Seroquel   -begin Zyprexa 2.5 mg po BID (will add prn dose if she tolerates medication)  -increase Duloxetine to 30 mg po daily. Prior use of Prozac 10 mg po daily stopped in 1/2023 with increased knee/joint pain started Duloxetine which has been effective    (R10.84) Abdominal pain, generalized  (K59.01) Slow transit constipation   Comment: hx of fecal impaction   Plan:   -abdominal flat plate to rule out bowel impaction affecting oral intake, mood and spirits   -Miralax 17g three times weekly  -adding Senna S 1 tab po daily at HS and 1 tab po BID PRN   -unable for bowel monitoring as self toilets     (R23.8) Skin irritation  Comment: refuses to wear socks  Plan:   -monitor skin  -could add skin prep daily to bony prominences for any s/s of skin breakdown         Electronically signed by:  JACKI Luther CNP

## 2023-06-28 ENCOUNTER — ASSISTED LIVING VISIT (OUTPATIENT)
Dept: GERIATRICS | Facility: CLINIC | Age: 73
End: 2023-06-28
Payer: COMMERCIAL

## 2023-06-28 VITALS
DIASTOLIC BLOOD PRESSURE: 85 MMHG | RESPIRATION RATE: 18 BRPM | BODY MASS INDEX: 21.21 KG/M2 | HEIGHT: 64 IN | TEMPERATURE: 97.2 F | HEART RATE: 74 BPM | WEIGHT: 124.2 LBS | SYSTOLIC BLOOD PRESSURE: 120 MMHG | OXYGEN SATURATION: 99 %

## 2023-06-28 DIAGNOSIS — K59.01 SLOW TRANSIT CONSTIPATION: ICD-10-CM

## 2023-06-28 DIAGNOSIS — F02.818 ALZHEIMER'S DEMENTIA WITH BEHAVIORAL DISTURBANCE (H): Primary | ICD-10-CM

## 2023-06-28 DIAGNOSIS — G30.9 ALZHEIMER'S DEMENTIA WITH BEHAVIORAL DISTURBANCE (H): Primary | ICD-10-CM

## 2023-06-28 DIAGNOSIS — F33.8 OTHER RECURRENT DEPRESSIVE DISORDERS (H): ICD-10-CM

## 2023-06-28 DIAGNOSIS — R23.8 SKIN IRRITATION: ICD-10-CM

## 2023-06-28 DIAGNOSIS — R46.89 AGGRESSIVE BEHAVIOR: ICD-10-CM

## 2023-06-28 DIAGNOSIS — F43.21 ADJUSTMENT DISORDER WITH DEPRESSED MOOD: ICD-10-CM

## 2023-06-28 DIAGNOSIS — R10.84 ABDOMINAL PAIN, GENERALIZED: ICD-10-CM

## 2023-06-28 PROCEDURE — 99349 HOME/RES VST EST MOD MDM 40: CPT | Performed by: NURSE PRACTITIONER

## 2023-06-28 RX ORDER — OLANZAPINE 2.5 MG/1
2.5 TABLET, FILM COATED ORAL 2 TIMES DAILY
Qty: 30 TABLET | Refills: 3 | Status: SHIPPED | OUTPATIENT
Start: 2023-06-28 | End: 2023-07-26

## 2023-06-28 RX ORDER — SENNA AND DOCUSATE SODIUM 50; 8.6 MG/1; MG/1
1 TABLET, FILM COATED ORAL AT BEDTIME
Qty: 30 TABLET | Refills: 3 | Status: SHIPPED | OUTPATIENT
Start: 2023-06-28 | End: 2023-08-11

## 2023-06-28 RX ORDER — DULOXETIN HYDROCHLORIDE 30 MG/1
30 CAPSULE, DELAYED RELEASE ORAL DAILY
Qty: 30 CAPSULE | Refills: 4 | Status: SHIPPED | OUTPATIENT
Start: 2023-06-28 | End: 2023-11-21

## 2023-06-28 NOTE — LETTER
Marie Connolly orders:     Increase to DULoxetine (CYMBALTA) 30 MG capsule Take 1 capsule (30 mg) by mouth daily    Begin OLANZapine (ZYPREXA) 2.5 MG tablet Take 1 tablet (2.5 mg) by mouth 2 times daily     -discontinue Seroquel    -Abdominal flat plate for bowel impaction     -  Begin SENNA-docusate sodium (SENNA S) 8.6-50 MG tablet Take 1 tablet by mouth At Bedtime 1 tab po BID prn           JACKI Luther CNP on 6/28/2023 at 5:51 PM

## 2023-06-28 NOTE — LETTER
6/28/2023        RE: [facility]  [address]  [address]  [place] [address]        [facility]  [facility] Record Number:  [id number]  Place of Service where encounter took place:  [facility] [[id number]]  Chief Complaint   Patient presents with     Nursing Home Acute     Foot blisters       HPI:    Mraie Connolly  is a 72 year old (1950), who is being seen today for an episodic care visit.  HPI information obtained from: facility chart records, facility staff and family/first contact son report. Today's concern is:    Seen today for follow up to blisters on feet, increased agitation, isolating more in room. Behaviors noted by family and staff. Has been going on for a few weeks. She is refusing care, prn Seroquel used twice last month in addition to her scheduled doses.  Her son doesn't think the Seroquel is effective anymore. She is happy one minute then aggressive verbally and physically with staff and other residents.  Seen in her room, she is laying on her bed reading the paper. Denies any acute concerns but then says has lower abdominal pain. She is not eating as much lately and does have a history of bowel constipation, hemorrhoids.,    Great toes and little toes are red. No skin breakdown. She refuses to wear socks with her shoes and the skin rubs again the footwear.     Past Medical and Surgical History reviewed in Epic today.    MEDICATIONS:    Current Outpatient Medications   Medication Sig Dispense Refill     DULoxetine (CYMBALTA) 30 MG capsule Take 1 capsule (30 mg) by mouth daily 30 capsule 4     OLANZapine (ZYPREXA) 2.5 MG tablet Take 1 tablet (2.5 mg) by mouth 2 times daily 30 tablet 3     SENNA-docusate sodium (SENNA S) 8.6-50 MG tablet Take 1 tablet by mouth At Bedtime 1 tab po BID prn 30 tablet 3     acetaminophen (TYLENOL) 325 MG tablet TAKE TWO TABLETS (650MG) BY MOUTH TWICE DAILY;MAY ALSO TAKE TWO  "TABLETS (650MG) BY MOUTH TWICE DAILY AS NEEDED 720 tablet 97     amLODIPine (NORVASC) 2.5 MG tablet TAKE 1 TABLET BY MOUTH ONCE DAILY 90 tablet 97     atorvastatin (LIPITOR) 40 MG tablet TAKE 1 TABLET BY MOUTH ONCE DAILY 90 tablet 97     diclofenac (VOLTAREN) 1 % topical gel Apply 2 g topically daily Apply each morning to both knees. Keep in locked cabinet 100 g 3     fenofibrate (TRICOR) 145 MG tablet TAKE 1 TABLET BY MOUTH ONCE DAILY 90 tablet 97     hydrocortisone, Perianal, (ANUSOL-HC) 2.5 % cream PLACE RECTALLY TWICE DAILY NEEDED 30 g 11     Multiple Vitamins-Minerals (ONE-A-DAY WITHIN) TABS TAKE 1 TABLET BY MOUTH ONCE DAILY 90 tablet 97     polyethylene glycol (MIRALAX) 17 GM/Dose powder Take 17 g (1 capful) by mouth three times a week       Sodium Phosphates (ENEMA) 7-19 GM/118ML ENEM Place 1 enema rectally daily as needed (constipation)       VITAMIN D3 50 MCG (2000 UT) tablet TAKE 1 TABLET BY MOUTH ONCE DAILY 30 tablet 11     REVIEW OF SYSTEMS:  Limited secondary to cognitive impairment but today pt reports ok    Objective:  /85   Pulse 74   Temp 97.2  F (36.2  C)   Resp 18   Ht 1.626 m (5' 4\")   Wt 56.3 kg (124 lb 3.2 oz)   SpO2 99%   BMI 21.32 kg/m    Exam:  GENERAL APPEARANCE:  Alert, in no distress, appears healthy  ENT:  Mouth and posterior oropharynx normal, moist mucous membranes  RESP:  respiratory effort and palpation of chest normal, lungs clear to auscultation   CV:  Palpation and auscultation of heart done , regular rate and rhythm, no murmur, rub, or gallop, trace edema  ABDOMEN:  hypoactive bowel sounds, soft, nontender  M/S:   Gait and station w/o assistive device-limps at times (not today), ROM with both knees  SKIN:  acute thrombosed external hemorrhoid left side of anus swollen, approximately 4cm length, 1.5 cm wide    NEURO:   Cranial nerves 2-12 are normal tested and grossly at patient's baseline  PSYCH:  insight and judgement impaired, memory impaired     Labs:   CBC " RESULTS: Recent Labs   Lab Test 02/17/22  0751 08/25/21  1458   WBC 4.6 5.9   RBC 4.63 4.50   HGB 14.1 14.7   HCT 45.3 43.7   MCV 98 97   MCH 30.5 32.7   MCHC 31.1* 33.6   RDW 13.5 13.7    199       Last Basic Metabolic Panel:  Recent Labs   Lab Test 02/17/22  0751 08/25/21  1458    140   POTASSIUM 3.6 4.2   CHLORIDE 110* 106   NERIS 9.6 9.2   CO2 26 29   BUN 12 9   CR 0.93 0.89   * 101*       Liver Function Studies -   Recent Labs   Lab Test 08/25/21  1458   PROTTOTAL 7.3   ALBUMIN 3.8   BILITOTAL 0.5   ALKPHOS 55   AST 34   ALT 40       TSH   Date Value Ref Range Status   08/25/2021 1.08 0.40 - 4.00 mU/L Final       No results found for: A1C    ASSESSMENT/PLAN:  (G30.9,  F02.818) Alzheimer's dementia with behavioral disturbance (H)  (primary encounter diagnosis)  (F33.8) Other recurrent depressive disorders (H)  (F43.21) Adjustment disorder with depressed mood  (R46.89) Aggressive behavior  Comment: ongoing   Plan:   -discontinue Seroquel   -begin Zyprexa 2.5 mg po BID (will add prn dose if she tolerates medication)  -increase Duloxetine to 30 mg po daily. Prior use of Prozac 10 mg po daily stopped in 1/2023 with increased knee/joint pain started Duloxetine which has been effective    (R10.84) Abdominal pain, generalized  (K59.01) Slow transit constipation   Comment: hx of fecal impaction   Plan:   -abdominal flat plate to rule out bowel impaction affecting oral intake, mood and spirits   -Miralax 17g three times weekly  -adding Senna S 1 tab po daily at HS and 1 tab po BID PRN   -unable for bowel monitoring as self toilets     (R23.8) Skin irritation  Comment: refuses to wear socks  Plan:   -monitor skin  -could add skin prep daily to bony prominences for any s/s of skin breakdown         Electronically signed by:  JACKI Luther CNP               Sincerely,        JACKI Luther CNP

## 2023-06-29 ENCOUNTER — TELEPHONE (OUTPATIENT)
Dept: GERIATRICS | Facility: CLINIC | Age: 73
End: 2023-06-29
Payer: COMMERCIAL

## 2023-06-29 NOTE — TELEPHONE ENCOUNTER
Medication/Dose: OLANZAPINE 5MG TABS. TAKE 1/2 TABS (2.5MG) TWICE DAILY    Insurance Name:GIORGIO GONZÁLES  Insurance ID: [id number]  GROUP: [id number]  BIN: [id number]  PCN: [id number]  Insurance Phone Number: [phone]    Vicente Lawn  Pharmacy Technician  [facility]  [address], [place] [address]  Phone: [phone]  Fax: [phone]

## 2023-06-30 NOTE — TELEPHONE ENCOUNTER
Central Prior Authorization Team   Phone: [phone]    PA Initiation    Medication: OLANZAPINE 5MG TABS  Insurance Company: [employer] - Phone [phone] Fax [phone]  Pharmacy Filling the Rx: [facility] - [address]  Filling Pharmacy Phone: [phone]  Filling Pharmacy Fax:    Start Date: 6/30/2023

## 2023-06-30 NOTE — TELEPHONE ENCOUNTER
Prior Authorization Approval    Authorization Effective Date: 4/1/2023  Authorization Expiration Date: 6/30/2024  Medication: OLANZAPINE 5MG TABS  Approved Dose/Quantity:    Reference #:     Insurance Company: [employer] - Phone [phone] Fax [phone]  Expected CoPay:       CoPay Card Available:      Foundation Assistance Needed:    Which Pharmacy is filling the prescription (Not needed for infusion/clinic administered): [facility] - [address]  Pharmacy Notified: Yes  Patient Notified: Yes

## 2023-07-05 ENCOUNTER — TELEPHONE (OUTPATIENT)
Dept: GERIATRICS | Facility: CLINIC | Age: 73
End: 2023-07-05

## 2023-07-25 DIAGNOSIS — R46.89 AGGRESSIVE BEHAVIOR: ICD-10-CM

## 2023-07-25 DIAGNOSIS — F02.818 ALZHEIMER'S DEMENTIA WITH BEHAVIORAL DISTURBANCE (H): ICD-10-CM

## 2023-07-25 DIAGNOSIS — G30.9 ALZHEIMER'S DEMENTIA WITH BEHAVIORAL DISTURBANCE (H): ICD-10-CM

## 2023-07-26 RX ORDER — OLANZAPINE 5 MG/1
TABLET ORAL
Qty: 90 TABLET | Refills: 97 | Status: SHIPPED | OUTPATIENT
Start: 2023-07-26 | End: 2023-12-06

## 2023-08-10 DIAGNOSIS — K59.01 SLOW TRANSIT CONSTIPATION: ICD-10-CM

## 2023-08-11 RX ORDER — DOCUSATE SODIUM 50MG AND SENNOSIDES 8.6MG 8.6; 5 MG/1; MG/1
TABLET, FILM COATED ORAL
Qty: 270 TABLET | Refills: 97 | Status: SHIPPED | OUTPATIENT
Start: 2023-08-11 | End: 2024-09-02

## 2023-09-10 DIAGNOSIS — K59.00 CONSTIPATION: Primary | ICD-10-CM

## 2023-09-11 RX ORDER — POLYETHYLENE GLYCOL 3350 17 G/17G
POWDER, FOR SOLUTION ORAL
Qty: 510 G | Refills: 11 | Status: SHIPPED | OUTPATIENT
Start: 2023-09-11

## 2023-09-19 ENCOUNTER — ASSISTED LIVING VISIT (OUTPATIENT)
Dept: GERIATRICS | Facility: CLINIC | Age: 73
End: 2023-09-19
Payer: COMMERCIAL

## 2023-09-19 VITALS
HEART RATE: 71 BPM | RESPIRATION RATE: 13 BRPM | SYSTOLIC BLOOD PRESSURE: 152 MMHG | OXYGEN SATURATION: 96 % | WEIGHT: 126.6 LBS | BODY MASS INDEX: 21.61 KG/M2 | DIASTOLIC BLOOD PRESSURE: 90 MMHG | HEIGHT: 64 IN | TEMPERATURE: 97.5 F

## 2023-09-19 DIAGNOSIS — M17.0 PRIMARY OSTEOARTHRITIS OF BOTH KNEES: ICD-10-CM

## 2023-09-19 DIAGNOSIS — K59.01 SLOW TRANSIT CONSTIPATION: ICD-10-CM

## 2023-09-19 DIAGNOSIS — I10 ESSENTIAL HYPERTENSION: ICD-10-CM

## 2023-09-19 DIAGNOSIS — F02.818 ALZHEIMER'S DEMENTIA WITH BEHAVIORAL DISTURBANCE (H): Primary | ICD-10-CM

## 2023-09-19 DIAGNOSIS — G30.9 ALZHEIMER'S DEMENTIA WITH BEHAVIORAL DISTURBANCE (H): Primary | ICD-10-CM

## 2023-09-19 DIAGNOSIS — E78.2 MIXED HYPERLIPIDEMIA: ICD-10-CM

## 2023-09-19 DIAGNOSIS — F33.8 OTHER RECURRENT DEPRESSIVE DISORDERS (H): ICD-10-CM

## 2023-09-19 DIAGNOSIS — R46.89 AGGRESSIVE BEHAVIOR: ICD-10-CM

## 2023-09-19 DIAGNOSIS — F43.21 ADJUSTMENT DISORDER WITH DEPRESSED MOOD: ICD-10-CM

## 2023-09-19 DIAGNOSIS — M85.89 OTHER SPECIFIED DISORDERS OF BONE DENSITY AND STRUCTURE, MULTIPLE SITES: ICD-10-CM

## 2023-09-19 DIAGNOSIS — Z12.31 ENCOUNTER FOR SCREENING MAMMOGRAM FOR BREAST CANCER: ICD-10-CM

## 2023-09-19 DIAGNOSIS — M85.80 OSTEOPENIA, UNSPECIFIED LOCATION: ICD-10-CM

## 2023-09-19 DIAGNOSIS — E23.6 OTHER DISORDERS OF PITUITARY GLAND (H): ICD-10-CM

## 2023-09-19 PROCEDURE — 99349 HOME/RES VST EST MOD MDM 40: CPT | Performed by: NURSE PRACTITIONER

## 2023-09-19 NOTE — LETTER
Marie Connolly orders    -increase lipitor to 80 mg po daily         Armaan Carr, JACKI CNP on 9/22/2023 at 12:03 PM

## 2023-09-19 NOTE — PROGRESS NOTES
[facility]  Chief Complaint   Patient presents with    Annual Comprehensive Exam Assisted Living     [facility] Record Number:  [id number]  Place of Service where encounter took place:  [facility] [[id number]]    HPI:    Marie Connolly  is a 72 year old  (1950), who is being seen today for an annual comprehensive visit. HPI information obtained from: facility chart records, facility staff, and patient report.  Today's concerns are:    Seen today for annual visit. Staff reporting improvement in mood/spirits and less agitation. Resident denies any acute concerns. Likes activities on the [facility] unit.     ALLERGIES: Patient has no known allergies.  PAST MEDICAL HISTORY:  has a past medical history of Dementia in Alzheimer's disease (H) (11/30/2017), Essential hypertension (5/21/2021), Other disorders of pituitary gland (H) (12/27/2021), Other long term (current) drug therapy (12/27/2021), Other recurrent depressive disorders (H) (5/21/2021), Primary open angle glaucoma of both eyes, mild stage (12/27/2021), Pure hypercholesterolemia (12/27/2021), Right wrist pain (11/1/2021), Unspecified age-related cataract (12/27/2021), and Vitamin D deficiency (12/27/2021).  PAST SURGICAL HISTORY:  has a past surgical history that includes CATARACT REMOVAL (05/21/2021).  IMMUNIZATIONS:  Immunization History   Administered Date(s) Administered    COVID-19 Bivalent 12+ (Pfizer) 11/17/2022    COVID-19 MONOVALENT 12+ (Pfizer) 06/22/2022    COVID-19 Monovalent 18+ (Moderna) 11/23/2021    COVID-19 Vaccine (Gisselle) 03/13/2021    Flu 65+ Years 01/31/2018    Influenza (High Dose) 3 valent vaccine 10/03/2016, 08/17/2020    Influenza (IIV3) PF 10/29/2010, 09/09/2011, 09/18/2012, 09/16/2013    Influenza Vaccine 65+ (Fluzone HD) 09/04/2020, 09/23/2021, 10/05/2022    Influenza Vaccine >6 months (Alfuria,Fluzone) 10/14/2014, 09/25/2015, 10/21/2019    Pneumo Conj 13-V (2010&after) 10/03/2016     Pneumococcal 23 valent 06/05/2018    TD,PF 7+ (Tenivac) 01/04/2007    TDAP (Adacel,Boostrix) 04/22/2016    Zoster vaccine, live 10/15/2012     Above immunizations pulled from [facility]. [facility] and facility records also reconciled. Outstanding information sent to  to update [facility].  Future immunizations are not needed at this point as all recommended immunizations are up to date.     Current Outpatient Medications   Medication Sig Dispense Refill    acetaminophen (TYLENOL) 325 MG tablet TAKE TWO TABLETS (650MG) BY MOUTH TWICE DAILY;MAY ALSO TAKE TWO TABLETS (650MG) BY MOUTH TWICE DAILY AS NEEDED 720 tablet 97    amLODIPine (NORVASC) 2.5 MG tablet TAKE 1 TABLET BY MOUTH ONCE DAILY 90 tablet 97    atorvastatin (LIPITOR) 40 MG tablet TAKE 1 TABLET BY MOUTH ONCE DAILY 90 tablet 97    diclofenac (VOLTAREN) 1 % topical gel Apply 2 g topically daily Apply each morning to both knees. Keep in locked cabinet 100 g 3    DULoxetine (CYMBALTA) 30 MG capsule Take 1 capsule (30 mg) by mouth daily 30 capsule 4    fenofibrate (TRICOR) 145 MG tablet TAKE 1 TABLET BY MOUTH ONCE DAILY 90 tablet 97    hydrocortisone, Perianal, (ANUSOL-HC) 2.5 % cream PLACE RECTALLY TWICE DAILY NEEDED 30 g 11    Multiple Vitamins-Minerals (ONE-A-DAY WITHIN) TABS TAKE 1 TABLET BY MOUTH ONCE DAILY 90 tablet 97    OLANZapine (ZYPREXA) 5 MG tablet TAKE ONE-HALF TABLET (2.5MG) BY MOUTH TWICE DAILY  90 tablet 97    polyethylene glycol (MIRALAX) 17 GM/Dose powder MIX 17GM OF POWDER IN 8OZ OF WATER UNTIL COMPLETELY DISSOLVED. DRINK SOLUTION BY MOUTH 3 TIMES WEEKLY. 510 g 11    SENEXON-S 8.6-50 MG tablet TAKE 1 TABLET BY MOUTH AT BEDTIME;& MAY TAKE 1 TABLET BY MOUTH TWICE DAILY AS NEEDED 270 tablet 97    Sodium Phosphates (ENEMA) 7-19 GM/118ML ENEM Place 1 enema rectally daily as needed (constipation)      VITAMIN D3 50 MCG (2000 UT) tablet TAKE 1 TABLET BY MOUTH ONCE DAILY 30 tablet 11       Case Management:  I have reviewed the Assisted  "Living care plan, current immunizations and preventive care/cancer screening. .Future cancer screening indicated is FIT test and mammogram, DEXAif family assist  and provider and pt will formulate a POC Patient's desire to return to the community is not assessible due to cognitive impairment. Current Level of Care is appropriate.    Advance Directive Discussion:    I reviewed the current advanced directives as reflected in EPIC, the POLST and the facility chart, and verified the congruency of orders. I contacted the first party juliana and left a message regarding the plan of Care.  I did not due to cognitive impairment review the advance directives with the resident.     Team Discussion:  I communicated with the appropriate disciplines involved with the Plan of Care:   Nursing    Patient's goal is pain control and comfort and treat acute and chronic illness .  Information reviewed:  Medications, vital signs, orders, and nursing notes.    ROS:  Limited secondary to cognitive impairment but today pt reports fine    Vitals:  BP (!) 152/90   Pulse 71   Temp 97.5  F (36.4  C)   Resp 13   Ht 1.626 m (5' 4\")   Wt 57.4 kg (126 lb 9.6 oz)   SpO2 96%   BMI 21.73 kg/m   Body mass index is 21.73 kg/m .  Exam:  GENERAL APPEARANCE:  Alert, in no distress, appears healthy  ENT:  Mouth and posterior oropharynx normal, moist mucous membranes  RESP:  respiratory effort and palpation of chest normal, lungs clear to auscultation   CV:  Palpation and auscultation of heart done , regular rate and rhythm, no murmur, rub, or gallop, trace edema, no compression   ABDOMEN:  bowel sounds, soft, nontender  M/S:   Gait and station w/o assistive device-limps at times (not as much), ROM with both knees  SKIN: no external hemorrhoid  NEURO:   Cranial nerves 2-12 are normal tested and grossly at patient's baseline  PSYCH:  insight and judgement impaired, memory impaired       Lab/Diagnostic data:   CBC RESULTS:   Recent Labs   Lab Test " 09/21/23  0810 02/17/22  0751   WBC 4.6 4.6   RBC 4.37 4.63   HGB 14.1 14.1   HCT 44.1 45.3   * 98   MCH 32.3 30.5   MCHC 32.0 31.1*   RDW 12.4 13.5    262       Last Basic Metabolic Panel:  Recent Labs   Lab Test 09/21/23  0810 02/17/22  0751    141   POTASSIUM 3.5 3.6   CHLORIDE 108* 110*   NERIS 9.8 9.6   CO2 25 26   BUN 10.9 12   CR 0.77 0.93   * 106*       Liver Function Studies -   Recent Labs   Lab Test 09/21/23  0810 08/25/21  1458   PROTTOTAL 7.3 7.3   ALBUMIN 4.2 3.8   BILITOTAL 0.4 0.5   ALKPHOS 46 55   AST 32 34   ALT 31 40       TSH   Date Value Ref Range Status   09/21/2023 1.15 0.30 - 4.20 uIU/mL Final   08/25/2021 1.08 0.40 - 4.00 mU/L Final       Lab Results   Component Value Date    A1C 6.3 09/21/2023     Recent Labs   Lab Test 09/21/23  0810 02/17/22  0751   CHOL 223* 215*   HDL 52 68   * 123*   TRIG 179* 119       ASSESSMENT/PLAN  (G30.9,  F02.818) Alzheimer's dementia with behavioral disturbance (H)  (primary encounter diagnosis)  (F33.8) Other recurrent depressive disorders (H)  (F43.21) Adjustment disorder with depressed mood  (R46.89) Aggressive behavior  Comment: improved per staff report  Plan:   -discontinue Seroquel at previous visit   -Zyprexa 2.5 mg po BID (prn not needed)  -increased Duloxetine to 30 mg po daily. Prior use of Prozac 10 mg po daily stopped in 1/2023 with increased knee/joint pain started Duloxetine which has been effective     (K59.01) Slow transit constipation   Comment: hx of fecal impaction   Plan:   -Miralax 17g three times weekly  -Senna S 1 tab po daily at HS and 1 tab po BID PRN   -unable for bowel monitoring as self toilets      (I10) Essential hypertension  (E78.2) Mixed hyperlipidemia  Comment: stable  Plan:   -Norvasc 2.5 mg po daily   -lipitor 40 mg po daily increased to 80 mg po daily   -fenofibrate 145 mg po daily  -BMP periodically  -lipids periodically     (M85.80) Osteopenia, unspecified location  (M85.89) Other specified  disorders of bone density and structure, multiple sites  (M17.0) Primary osteoarthritis of both knees  Comment: no recent falls  Plan:  -continue current plan of care  -DEXA    (Z12.31) Encounter for screening mammogram for breast cancer  Comment: prevention screen  Plan:   -MA Screening Digital Bilateral          (E23.6) Other disorders of pituitary gland (H)   Comment: per chart review  Plan:  -adding ACTH for lab testing          Electronically signed by:  JACKI Luther CNP

## 2023-09-19 NOTE — LETTER
9/19/2023        RE: [facility]  [address]  [address]  [place] [address]        [facility]  Chief Complaint   Patient presents with     Annual Comprehensive Exam Assisted Living     [facility] Record Number:  [id number]  Place of Service where encounter took place:  [facility] [[id number]]    HPI:    Marie Connolly  is a 72 year old  (1950), who is being seen today for an annual comprehensive visit. HPI information obtained from: facility chart records, facility staff, and patient report.  Today's concerns are:    Seen today for annual visit. Staff reporting improvement in mood/spirits and less agitation. Resident denies any acute concerns. Likes activities on the  unit.     ALLERGIES: Patient has no known allergies.  PAST MEDICAL HISTORY:  has a past medical history of Dementia in Alzheimer's disease (H) (11/30/2017), Essential hypertension (5/21/2021), Other disorders of pituitary gland (H) (12/27/2021), Other long term (current) drug therapy (12/27/2021), Other recurrent depressive disorders (H) (5/21/2021), Primary open angle glaucoma of both eyes, mild stage (12/27/2021), Pure hypercholesterolemia (12/27/2021), Right wrist pain (11/1/2021), Unspecified age-related cataract (12/27/2021), and Vitamin D deficiency (12/27/2021).  PAST SURGICAL HISTORY:  has a past surgical history that includes CATARACT REMOVAL (05/21/2021).  IMMUNIZATIONS:  Immunization History   Administered Date(s) Administered     COVID-19 Bivalent 12+ (Pfizer) 11/17/2022     COVID-19 MONOVALENT 12+ (Pfizer) 06/22/2022     COVID-19 Monovalent 18+ (Moderna) 11/23/2021     COVID-19 Vaccine (Gisselle) 03/13/2021     Flu 65+ Years 01/31/2018     Influenza (High Dose) 3 valent vaccine 10/03/2016, 08/17/2020     Influenza (IIV3) PF 10/29/2010, 09/09/2011, 09/18/2012, 09/16/2013     Influenza Vaccine 65+ (Fluzone HD) 09/04/2020, 09/23/2021,  10/05/2022     Influenza Vaccine >6 months (Alfuria,Fluzone) 10/14/2014, 09/25/2015, 10/21/2019     Pneumo Conj 13-V (2010&after) 10/03/2016     Pneumococcal 23 valent 06/05/2018     TD,PF 7+ (Tenivac) 01/04/2007     TDAP (Adacel,Boostrix) 04/22/2016     Zoster vaccine, live 10/15/2012     Above immunizations pulled from [facility]. [facility] and facility records also reconciled. Outstanding information sent to  to update [facility].  Future immunizations are not needed at this point as all recommended immunizations are up to date.     Current Outpatient Medications   Medication Sig Dispense Refill     acetaminophen (TYLENOL) 325 MG tablet TAKE TWO TABLETS (650MG) BY MOUTH TWICE DAILY;MAY ALSO TAKE TWO TABLETS (650MG) BY MOUTH TWICE DAILY AS NEEDED 720 tablet 97     amLODIPine (NORVASC) 2.5 MG tablet TAKE 1 TABLET BY MOUTH ONCE DAILY 90 tablet 97     atorvastatin (LIPITOR) 40 MG tablet TAKE 1 TABLET BY MOUTH ONCE DAILY 90 tablet 97     diclofenac (VOLTAREN) 1 % topical gel Apply 2 g topically daily Apply each morning to both knees. Keep in locked cabinet 100 g 3     DULoxetine (CYMBALTA) 30 MG capsule Take 1 capsule (30 mg) by mouth daily 30 capsule 4     fenofibrate (TRICOR) 145 MG tablet TAKE 1 TABLET BY MOUTH ONCE DAILY 90 tablet 97     hydrocortisone, Perianal, (ANUSOL-HC) 2.5 % cream PLACE RECTALLY TWICE DAILY NEEDED 30 g 11     Multiple Vitamins-Minerals (ONE-A-DAY WITHIN) TABS TAKE 1 TABLET BY MOUTH ONCE DAILY 90 tablet 97     OLANZapine (ZYPREXA) 5 MG tablet TAKE ONE-HALF TABLET (2.5MG) BY MOUTH TWICE DAILY  90 tablet 97     polyethylene glycol (MIRALAX) 17 GM/Dose powder MIX 17GM OF POWDER IN 8OZ OF WATER UNTIL COMPLETELY DISSOLVED. DRINK SOLUTION BY MOUTH 3 TIMES WEEKLY. 510 g 11     SENEXON-S 8.6-50 MG tablet TAKE 1 TABLET BY MOUTH AT BEDTIME;& MAY TAKE 1 TABLET BY MOUTH TWICE DAILY AS NEEDED 270 tablet 97     Sodium Phosphates (ENEMA) 7-19 GM/118ML ENEM Place 1 enema rectally daily as  "needed (constipation)       VITAMIN D3 50 MCG (2000 UT) tablet TAKE 1 TABLET BY MOUTH ONCE DAILY 30 tablet 11       Case Management:  I have reviewed the Assisted Living care plan, current immunizations and preventive care/cancer screening. .Future cancer screening indicated is FIT test and mammogram, DEXAif family assist  and provider and pt will formulate a POC Patient's desire to return to the community is not assessible due to cognitive impairment. Current Level of Care is appropriate.    Advance Directive Discussion:    I reviewed the current advanced directives as reflected in EPIC, the POLST and the facility chart, and verified the congruency of orders. I contacted the first party juliana and left a message regarding the plan of Care.  I did not due to cognitive impairment review the advance directives with the resident.     Team Discussion:  I communicated with the appropriate disciplines involved with the Plan of Care:   Nursing    Patient's goal is pain control and comfort and treat acute and chronic illness .  Information reviewed:  Medications, vital signs, orders, and nursing notes.    ROS:  Limited secondary to cognitive impairment but today pt reports fine    Vitals:  BP (!) 152/90   Pulse 71   Temp 97.5  F (36.4  C)   Resp 13   Ht 1.626 m (5' 4\")   Wt 57.4 kg (126 lb 9.6 oz)   SpO2 96%   BMI 21.73 kg/m   Body mass index is 21.73 kg/m .  Exam:  GENERAL APPEARANCE:  Alert, in no distress, appears healthy  ENT:  Mouth and posterior oropharynx normal, moist mucous membranes  RESP:  respiratory effort and palpation of chest normal, lungs clear to auscultation   CV:  Palpation and auscultation of heart done , regular rate and rhythm, no murmur, rub, or gallop, trace edema, no compression   ABDOMEN:  bowel sounds, soft, nontender  M/S:   Gait and station w/o assistive device-limps at times (not as much), ROM with both knees  SKIN: no external hemorrhoid  NEURO:   Cranial nerves 2-12 are normal tested " and grossly at patient's baseline  PSYCH:  insight and judgement impaired, memory impaired       Lab/Diagnostic data:   CBC RESULTS:   Recent Labs   Lab Test 09/21/23  0810 02/17/22  0751   WBC 4.6 4.6   RBC 4.37 4.63   HGB 14.1 14.1   HCT 44.1 45.3   * 98   MCH 32.3 30.5   MCHC 32.0 31.1*   RDW 12.4 13.5    262       Last Basic Metabolic Panel:  Recent Labs   Lab Test 09/21/23  0810 02/17/22  0751    141   POTASSIUM 3.5 3.6   CHLORIDE 108* 110*   NERIS 9.8 9.6   CO2 25 26   BUN 10.9 12   CR 0.77 0.93   * 106*       Liver Function Studies -   Recent Labs   Lab Test 09/21/23  0810 08/25/21  1458   PROTTOTAL 7.3 7.3   ALBUMIN 4.2 3.8   BILITOTAL 0.4 0.5   ALKPHOS 46 55   AST 32 34   ALT 31 40       TSH   Date Value Ref Range Status   09/21/2023 1.15 0.30 - 4.20 uIU/mL Final   08/25/2021 1.08 0.40 - 4.00 mU/L Final       Lab Results   Component Value Date    A1C 6.3 09/21/2023     Recent Labs   Lab Test 09/21/23  0810 02/17/22  0751   CHOL 223* 215*   HDL 52 68   * 123*   TRIG 179* 119       ASSESSMENT/PLAN  (G30.9,  F02.818) Alzheimer's dementia with behavioral disturbance (H)  (primary encounter diagnosis)  (F33.8) Other recurrent depressive disorders (H)  (F43.21) Adjustment disorder with depressed mood  (R46.89) Aggressive behavior  Comment: improved per staff report  Plan:   -discontinue Seroquel at previous visit   -Zyprexa 2.5 mg po BID (prn not needed)  -increased Duloxetine to 30 mg po daily. Prior use of Prozac 10 mg po daily stopped in 1/2023 with increased knee/joint pain started Duloxetine which has been effective     (K59.01) Slow transit constipation   Comment: hx of fecal impaction   Plan:   -Miralax 17g three times weekly  -Senna S 1 tab po daily at HS and 1 tab po BID PRN   -unable for bowel monitoring as self toilets      (I10) Essential hypertension  (E78.2) Mixed hyperlipidemia  Comment: stable  Plan:   -Norvasc 2.5 mg po daily   -lipitor 40 mg po daily increased to  80 mg po daily   -fenofibrate 145 mg po daily  -BMP periodically  -lipids periodically     (M85.80) Osteopenia, unspecified location  (M85.89) Other specified disorders of bone density and structure, multiple sites  (M17.0) Primary osteoarthritis of both knees  Comment: no recent falls  Plan:  -continue current plan of care  -DEXA    (Z12.31) Encounter for screening mammogram for breast cancer  Comment: prevention screen  Plan:   -MA Screening Digital Bilateral          (E23.6) Other disorders of pituitary gland (H)   Comment: per chart review  Plan:  -adding ACTH for lab testing          Electronically signed by:  JACKI Luther CNP           Sincerely,        JACKI Luther CNP

## 2023-09-20 ENCOUNTER — LAB REQUISITION (OUTPATIENT)
Dept: LAB | Facility: CLINIC | Age: 73
End: 2023-09-20
Payer: COMMERCIAL

## 2023-09-20 DIAGNOSIS — E03.9 HYPOTHYROIDISM, UNSPECIFIED: ICD-10-CM

## 2023-09-20 DIAGNOSIS — E23.6 OTHER DISORDERS OF PITUITARY GLAND (H): ICD-10-CM

## 2023-09-20 DIAGNOSIS — E55.9 VITAMIN D DEFICIENCY, UNSPECIFIED: ICD-10-CM

## 2023-09-20 DIAGNOSIS — E11.9 TYPE 2 DIABETES MELLITUS WITHOUT COMPLICATIONS (H): ICD-10-CM

## 2023-09-20 DIAGNOSIS — E78.5 HYPERLIPIDEMIA, UNSPECIFIED: ICD-10-CM

## 2023-09-20 DIAGNOSIS — I10 ESSENTIAL (PRIMARY) HYPERTENSION: ICD-10-CM

## 2023-09-21 ENCOUNTER — EXTERNAL ORDER RESULTS (OUTPATIENT)
Dept: GERIATRICS | Facility: CLINIC | Age: 73
End: 2023-09-21

## 2023-09-21 DIAGNOSIS — E78.00 PURE HYPERCHOLESTEROLEMIA: Primary | ICD-10-CM

## 2023-09-21 LAB
ALBUMIN SERPL BCG-MCNC: 4.2 G/DL (ref 3.5–5.2)
ALP SERPL-CCNC: 46 U/L (ref 35–104)
ALT SERPL W P-5'-P-CCNC: 31 U/L (ref 0–50)
ANION GAP SERPL CALCULATED.3IONS-SCNC: 12 MMOL/L (ref 7–15)
AST SERPL W P-5'-P-CCNC: 32 U/L (ref 0–45)
BILIRUB DIRECT SERPL-MCNC: <0.2 MG/DL (ref 0–0.3)
BILIRUB SERPL-MCNC: 0.4 MG/DL
BUN SERPL-MCNC: 10.9 MG/DL (ref 8–23)
CALCIUM SERPL-MCNC: 9.8 MG/DL (ref 8.8–10.2)
CHLORIDE SERPL-SCNC: 108 MMOL/L (ref 98–107)
CHOLEST SERPL-MCNC: 223 MG/DL
CORTICOSTER SERPL-MCNC: 17.4 UG/DL (ref 4–22)
CREAT SERPL-MCNC: 0.77 MG/DL (ref 0.51–0.95)
DEPRECATED HCO3 PLAS-SCNC: 25 MMOL/L (ref 22–29)
EGFRCR SERPLBLD CKD-EPI 2021: 82 ML/MIN/1.73M2
ERYTHROCYTE [DISTWIDTH] IN BLOOD BY AUTOMATED COUNT: 12.4 % (ref 10–15)
GLUCOSE SERPL-MCNC: 104 MG/DL (ref 70–99)
HBA1C MFR BLD: 6.3 %
HCT VFR BLD AUTO: 44.1 % (ref 35–47)
HDLC SERPL-MCNC: 52 MG/DL
HGB BLD-MCNC: 14.1 G/DL (ref 11.7–15.7)
LDLC SERPL CALC-MCNC: 135 MG/DL
MCH RBC QN AUTO: 32.3 PG (ref 26.5–33)
MCHC RBC AUTO-ENTMCNC: 32 G/DL (ref 31.5–36.5)
MCV RBC AUTO: 101 FL (ref 78–100)
NONHDLC SERPL-MCNC: 171 MG/DL
PLATELET # BLD AUTO: 257 10E3/UL (ref 150–450)
POTASSIUM SERPL-SCNC: 3.5 MMOL/L (ref 3.4–5.3)
PROT SERPL-MCNC: 7.3 G/DL (ref 6.4–8.3)
RBC # BLD AUTO: 4.37 10E6/UL (ref 3.8–5.2)
SODIUM SERPL-SCNC: 145 MMOL/L (ref 136–145)
TRIGL SERPL-MCNC: 179 MG/DL
TSH SERPL DL<=0.005 MIU/L-ACNC: 1.15 UIU/ML (ref 0.3–4.2)
WBC # BLD AUTO: 4.6 10E3/UL (ref 4–11)

## 2023-09-21 PROCEDURE — 82248 BILIRUBIN DIRECT: CPT | Mod: ORL | Performed by: NURSE PRACTITIONER

## 2023-09-21 PROCEDURE — 83036 HEMOGLOBIN GLYCOSYLATED A1C: CPT | Mod: ORL | Performed by: NURSE PRACTITIONER

## 2023-09-21 PROCEDURE — 36415 COLL VENOUS BLD VENIPUNCTURE: CPT | Mod: ORL | Performed by: NURSE PRACTITIONER

## 2023-09-21 PROCEDURE — 84443 ASSAY THYROID STIM HORMONE: CPT | Mod: ORL | Performed by: NURSE PRACTITIONER

## 2023-09-21 PROCEDURE — 85027 COMPLETE CBC AUTOMATED: CPT | Mod: ORL | Performed by: NURSE PRACTITIONER

## 2023-09-21 PROCEDURE — 82533 TOTAL CORTISOL: CPT | Mod: ORL | Performed by: NURSE PRACTITIONER

## 2023-09-21 PROCEDURE — P9604 ONE-WAY ALLOW PRORATED TRIP: HCPCS | Mod: ORL | Performed by: NURSE PRACTITIONER

## 2023-09-21 PROCEDURE — 82306 VITAMIN D 25 HYDROXY: CPT | Mod: ORL | Performed by: NURSE PRACTITIONER

## 2023-09-21 PROCEDURE — 80061 LIPID PANEL: CPT | Mod: ORL | Performed by: NURSE PRACTITIONER

## 2023-09-21 PROCEDURE — 80053 COMPREHEN METABOLIC PANEL: CPT | Mod: ORL | Performed by: NURSE PRACTITIONER

## 2023-09-21 RX ORDER — ATORVASTATIN CALCIUM 40 MG/1
80 TABLET, FILM COATED ORAL DAILY
Qty: 30 TABLET | Refills: 11 | Status: SHIPPED | OUTPATIENT
Start: 2023-09-21 | End: 2024-08-05

## 2023-09-21 NOTE — PROGRESS NOTES
Elevated lipid panel so will increase statin    Plan:  -increase to    atorvastatin (LIPITOR) 40 MG tablet Take 2 tablets (80 mg) by mouth daily         JACKI Luther CNP on 9/21/2023 at 3:00 PM

## 2023-09-22 LAB — DEPRECATED CALCIDIOL+CALCIFEROL SERPL-MC: 45 UG/L (ref 20–75)

## 2023-10-11 ENCOUNTER — ASSISTED LIVING VISIT (OUTPATIENT)
Dept: GERIATRICS | Facility: CLINIC | Age: 73
End: 2023-10-11
Payer: COMMERCIAL

## 2023-10-11 VITALS
SYSTOLIC BLOOD PRESSURE: 137 MMHG | HEART RATE: 73 BPM | OXYGEN SATURATION: 96 % | TEMPERATURE: 97.1 F | RESPIRATION RATE: 20 BRPM | BODY MASS INDEX: 21.68 KG/M2 | HEIGHT: 64 IN | DIASTOLIC BLOOD PRESSURE: 93 MMHG | WEIGHT: 127 LBS

## 2023-10-11 DIAGNOSIS — G30.9 ALZHEIMER'S DEMENTIA WITH BEHAVIORAL DISTURBANCE (H): ICD-10-CM

## 2023-10-11 DIAGNOSIS — F43.21 ADJUSTMENT DISORDER WITH DEPRESSED MOOD: ICD-10-CM

## 2023-10-11 DIAGNOSIS — R13.12 OROPHARYNGEAL DYSPHAGIA: Primary | ICD-10-CM

## 2023-10-11 DIAGNOSIS — R46.89 AGGRESSIVE BEHAVIOR: ICD-10-CM

## 2023-10-11 DIAGNOSIS — F02.818 ALZHEIMER'S DEMENTIA WITH BEHAVIORAL DISTURBANCE (H): ICD-10-CM

## 2023-10-11 PROCEDURE — 99349 HOME/RES VST EST MOD MDM 40: CPT | Performed by: NURSE PRACTITIONER

## 2023-10-11 NOTE — LETTER
10/11/2023        RE: [facility]  [address]  [address]  [place] [address]        [facility]  [facility] Record Number:  [id number]  Place of Service where encounter took place:  [facility] [[id number]]  Chief Complaint   Patient presents with     RECHECK       HPI:    Marie Connolly  is a 73 year old (1950), who is being seen today for an episodic care visit.  HPI information obtained from: facility chart records, facility staff, patient report, and [employer] chart review. Today's concern is:    Resident with swallowing difficulty. Staff reporting she is not taking medications consistently. She is mostly drinking juices over food. She denies sore throat, mouth pain. She is sleeping with her dentures in. VSS, weight stable. Has advanced dementia so not a reliable historian,     Past Medical and Surgical History reviewed in Epic today.    MEDICATIONS:    Current Outpatient Medications   Medication Sig Dispense Refill     acetaminophen (TYLENOL) 325 MG tablet TAKE TWO TABLETS (650MG) BY MOUTH TWICE DAILY;MAY ALSO TAKE TWO TABLETS (650MG) BY MOUTH TWICE DAILY AS NEEDED 720 tablet 97     amLODIPine (NORVASC) 2.5 MG tablet TAKE 1 TABLET BY MOUTH ONCE DAILY 90 tablet 97     atorvastatin (LIPITOR) 40 MG tablet Take 2 tablets (80 mg) by mouth daily 30 tablet 11     diclofenac (VOLTAREN) 1 % topical gel Apply 2 g topically daily Apply each morning to both knees. Keep in locked cabinet 100 g 3     DULoxetine (CYMBALTA) 30 MG capsule Take 1 capsule (30 mg) by mouth daily 30 capsule 4     fenofibrate (TRICOR) 145 MG tablet TAKE 1 TABLET BY MOUTH ONCE DAILY 90 tablet 97     hydrocortisone, Perianal, (ANUSOL-HC) 2.5 % cream PLACE RECTALLY TWICE DAILY NEEDED 30 g 11     Multiple Vitamins-Minerals (ONE-A-DAY WITHIN) TABS TAKE 1 TABLET BY MOUTH ONCE DAILY 90 tablet 97     OLANZapine (ZYPREXA) 5 MG tablet TAKE ONE-HALF TABLET  "(2.5MG) BY MOUTH TWICE DAILY  90 tablet 97     polyethylene glycol (MIRALAX) 17 GM/Dose powder MIX 17GM OF POWDER IN 8OZ OF WATER UNTIL COMPLETELY DISSOLVED. DRINK SOLUTION BY MOUTH 3 TIMES WEEKLY. 510 g 11     SENEXON-S 8.6-50 MG tablet TAKE 1 TABLET BY MOUTH AT BEDTIME;& MAY TAKE 1 TABLET BY MOUTH TWICE DAILY AS NEEDED 270 tablet 97     Sodium Phosphates (ENEMA) 7-19 GM/118ML ENEM Place 1 enema rectally daily as needed (constipation)       VITAMIN D3 50 MCG (2000 UT) tablet TAKE 1 TABLET BY MOUTH ONCE DAILY 30 tablet 11     REVIEW OF SYSTEMS:  Limited secondary to cognitive impairment but today pt reports ok    Objective:  BP (!) 137/93   Pulse 73   Temp 97.1  F (36.2  C)   Resp 20   Ht 1.626 m (5' 4\")   Wt 57.6 kg (127 lb)   SpO2 96%   BMI 21.80 kg/m    Exam:  GENERAL APPEARANCE:  Alert, in no distress  ENT:  Mouth and posterior oropharynx normal, moist mucous membranes, no redness in mouth, upper dentures in   RESP:  respiratory effort and palpation of chest normal, lungs clear to auscultation   CV:  Palpation and auscultation of heart done , regular rate and rhythm, no murmur, rub, or gallop, trace edema, no compression   ABDOMEN:  bowel sounds, soft, nontender  M/S:   Gait and station w/o assistive device-limps at times (not as much), ROM with both knees  SKIN: no external hemorrhoid  NEURO:   Cranial nerves 2-12 are normal tested and grossly at patient's baseline  PSYCH:  insight and judgement impaired, memory impaired     Labs:   CBC RESULTS:   Recent Labs   Lab Test 09/21/23  0810 02/17/22  0751   WBC 4.6 4.6   RBC 4.37 4.63   HGB 14.1 14.1   HCT 44.1 45.3   * 98   MCH 32.3 30.5   MCHC 32.0 31.1*   RDW 12.4 13.5    262       Last Basic Metabolic Panel:  Recent Labs   Lab Test 09/21/23  0810 02/17/22  0751    141   POTASSIUM 3.5 3.6   CHLORIDE 108* 110*   NERIS 9.8 9.6   CO2 25 26   BUN 10.9 12   CR 0.77 0.93   * 106*       Liver Function Studies -   Recent Labs   Lab Test " 09/21/23  0810 08/25/21  1458   PROTTOTAL 7.3 7.3   ALBUMIN 4.2 3.8   BILITOTAL 0.4 0.5   ALKPHOS 46 55   AST 32 34   ALT 31 40       TSH   Date Value Ref Range Status   09/21/2023 1.15 0.30 - 4.20 uIU/mL Final   08/25/2021 1.08 0.40 - 4.00 mU/L Final       Lab Results   Component Value Date    A1C 6.3 09/21/2023     ASSESSMENT/PLAN:  (R13.12) Oropharyngeal dysphagia  (primary encounter diagnosis)  Comment: newer issue   Plan:   -speech to eval and treat    (G30.9,  F02.818) Alzheimer's dementia with behavioral disturbance (H)  (F43.21) Adjustment disorder with depressed mood  (R46.89) Aggressive behavior  Comment: ongoing   Plan:   -continue current plan       Electronically signed by:  JACKI Luther CNP             Sincerely,        JACKI Luther CNP

## 2023-10-11 NOTE — PROGRESS NOTES
[facility]  [facility] Medical Record Number:  [id number]  Place of Service where encounter took place:  [facility] [[id number]]  Chief Complaint   Patient presents with    RECHECK       HPI:    Marie Connolly  is a 73 year old (1950), who is being seen today for an episodic care visit.  HPI information obtained from: facility chart records, facility staff, patient report, and [facility] chart review. Today's concern is:    Resident with swallowing difficulty. Staff reporting she is not taking medications consistently. She is mostly drinking juices over food. She denies sore throat, mouth pain. She is sleeping with her dentures in. VSS, weight stable. Has advanced dementia so not a reliable historian,     Past Medical and Surgical History reviewed in Epic today.    MEDICATIONS:    Current Outpatient Medications   Medication Sig Dispense Refill    acetaminophen (TYLENOL) 325 MG tablet TAKE TWO TABLETS (650MG) BY MOUTH TWICE DAILY;MAY ALSO TAKE TWO TABLETS (650MG) BY MOUTH TWICE DAILY AS NEEDED 720 tablet 97    amLODIPine (NORVASC) 2.5 MG tablet TAKE 1 TABLET BY MOUTH ONCE DAILY 90 tablet 97    atorvastatin (LIPITOR) 40 MG tablet Take 2 tablets (80 mg) by mouth daily 30 tablet 11    diclofenac (VOLTAREN) 1 % topical gel Apply 2 g topically daily Apply each morning to both knees. Keep in locked cabinet 100 g 3    DULoxetine (CYMBALTA) 30 MG capsule Take 1 capsule (30 mg) by mouth daily 30 capsule 4    fenofibrate (TRICOR) 145 MG tablet TAKE 1 TABLET BY MOUTH ONCE DAILY 90 tablet 97    hydrocortisone, Perianal, (ANUSOL-HC) 2.5 % cream PLACE RECTALLY TWICE DAILY NEEDED 30 g 11    Multiple Vitamins-Minerals (ONE-A-DAY WITHIN) TABS TAKE 1 TABLET BY MOUTH ONCE DAILY 90 tablet 97    OLANZapine (ZYPREXA) 5 MG tablet TAKE ONE-HALF TABLET (2.5MG) BY MOUTH TWICE DAILY  90 tablet 97    polyethylene glycol (MIRALAX) 17 GM/Dose powder MIX 17GM OF POWDER IN 8OZ OF WATER UNTIL  "COMPLETELY DISSOLVED. DRINK SOLUTION BY MOUTH 3 TIMES WEEKLY. 510 g 11    SENEXON-S 8.6-50 MG tablet TAKE 1 TABLET BY MOUTH AT BEDTIME;& MAY TAKE 1 TABLET BY MOUTH TWICE DAILY AS NEEDED 270 tablet 97    Sodium Phosphates (ENEMA) 7-19 GM/118ML ENEM Place 1 enema rectally daily as needed (constipation)      VITAMIN D3 50 MCG (2000 UT) tablet TAKE 1 TABLET BY MOUTH ONCE DAILY 30 tablet 11     REVIEW OF SYSTEMS:  Limited secondary to cognitive impairment but today pt reports ok    Objective:  BP (!) 137/93   Pulse 73   Temp 97.1  F (36.2  C)   Resp 20   Ht 1.626 m (5' 4\")   Wt 57.6 kg (127 lb)   SpO2 96%   BMI 21.80 kg/m    Exam:  GENERAL APPEARANCE:  Alert, in no distress  ENT:  Mouth and posterior oropharynx normal, moist mucous membranes, no redness in mouth, upper dentures in   RESP:  respiratory effort and palpation of chest normal, lungs clear to auscultation   CV:  Palpation and auscultation of heart done , regular rate and rhythm, no murmur, rub, or gallop, trace edema, no compression   ABDOMEN:  bowel sounds, soft, nontender  M/S:   Gait and station w/o assistive device-limps at times (not as much), ROM with both knees  SKIN: no external hemorrhoid  NEURO:   Cranial nerves 2-12 are normal tested and grossly at patient's baseline  PSYCH:  insight and judgement impaired, memory impaired     Labs:   CBC RESULTS:   Recent Labs   Lab Test 09/21/23  0810 02/17/22  0751   WBC 4.6 4.6   RBC 4.37 4.63   HGB 14.1 14.1   HCT 44.1 45.3   * 98   MCH 32.3 30.5   MCHC 32.0 31.1*   RDW 12.4 13.5    262       Last Basic Metabolic Panel:  Recent Labs   Lab Test 09/21/23  0810 02/17/22  0751    141   POTASSIUM 3.5 3.6   CHLORIDE 108* 110*   NERIS 9.8 9.6   CO2 25 26   BUN 10.9 12   CR 0.77 0.93   * 106*       Liver Function Studies -   Recent Labs   Lab Test 09/21/23  0810 08/25/21  1458   PROTTOTAL 7.3 7.3   ALBUMIN 4.2 3.8   BILITOTAL 0.4 0.5   ALKPHOS 46 55   AST 32 34   ALT 31 40       TSH "   Date Value Ref Range Status   09/21/2023 1.15 0.30 - 4.20 uIU/mL Final   08/25/2021 1.08 0.40 - 4.00 mU/L Final       Lab Results   Component Value Date    A1C 6.3 09/21/2023     ASSESSMENT/PLAN:  (R13.12) Oropharyngeal dysphagia  (primary encounter diagnosis)  Comment: newer issue   Plan:   -speech to eval and treat    (G30.9,  F02.818) Alzheimer's dementia with behavioral disturbance (H)  (F43.21) Adjustment disorder with depressed mood  (R46.89) Aggressive behavior  Comment: ongoing   Plan:   -continue current plan       Electronically signed by:  JACKI Luther CNP

## 2023-10-20 DIAGNOSIS — F33.8 OTHER RECURRENT DEPRESSIVE DISORDERS (H): Primary | ICD-10-CM

## 2023-10-20 RX ORDER — DULOXETINE 30 MG/1
1 CAPSULE, DELAYED RELEASE ORAL DAILY
Qty: 30 CAPSULE | Refills: 3 | Status: SHIPPED | OUTPATIENT
Start: 2023-10-20 | End: 2024-07-12

## 2023-10-25 ENCOUNTER — TELEPHONE (OUTPATIENT)
Dept: GERIATRICS | Facility: CLINIC | Age: 73
End: 2023-10-25
Payer: COMMERCIAL

## 2023-10-25 NOTE — TELEPHONE ENCOUNTER
[facility] Geriatrics Triage Nurse Telephone Encounter    Provider: JACKI Perdomo CNP  Facility: [facility] Facility Type:  AL    Caller: speech therapist  Call Back Number: [phone](AL phone number)    Allergies:  No Known Allergies     Reason for call: The speech therapist is calling to request orders for her meds to be crushed, per her recommendations.      Verbal Order/Direction given by Provider: Ok to crush meds.      Provider giving Order:  JACKI Perdomo CNP    Verbal Order given to: Perla Medina RN

## 2023-11-14 DIAGNOSIS — Z53.9 DIAGNOSIS NOT YET DEFINED: Primary | ICD-10-CM

## 2023-11-14 PROCEDURE — G0180 MD CERTIFICATION HHA PATIENT: HCPCS | Performed by: NURSE PRACTITIONER

## 2023-11-21 ENCOUNTER — PATIENT OUTREACH (OUTPATIENT)
Dept: CARE COORDINATION | Facility: CLINIC | Age: 73
End: 2023-11-21
Payer: COMMERCIAL

## 2023-11-21 DIAGNOSIS — F43.21 ADJUSTMENT DISORDER WITH DEPRESSED MOOD: ICD-10-CM

## 2023-11-21 RX ORDER — DULOXETIN HYDROCHLORIDE 30 MG/1
30 CAPSULE, DELAYED RELEASE ORAL DAILY
Qty: 31 CAPSULE | Refills: 11 | Status: SHIPPED | OUTPATIENT
Start: 2023-11-21 | End: 2023-12-05

## 2023-11-29 ENCOUNTER — LAB REQUISITION (OUTPATIENT)
Dept: LAB | Facility: CLINIC | Age: 73
End: 2023-11-29
Payer: COMMERCIAL

## 2023-11-29 DIAGNOSIS — E78.5 HYPERLIPIDEMIA, UNSPECIFIED: ICD-10-CM

## 2023-11-30 LAB
CHOLEST SERPL-MCNC: 208 MG/DL
HDLC SERPL-MCNC: 50 MG/DL
LDLC SERPL CALC-MCNC: 121 MG/DL
NONHDLC SERPL-MCNC: 158 MG/DL
TRIGL SERPL-MCNC: 183 MG/DL

## 2023-11-30 PROCEDURE — 36415 COLL VENOUS BLD VENIPUNCTURE: CPT | Mod: ORL | Performed by: NURSE PRACTITIONER

## 2023-11-30 PROCEDURE — P9604 ONE-WAY ALLOW PRORATED TRIP: HCPCS | Mod: ORL | Performed by: NURSE PRACTITIONER

## 2023-11-30 PROCEDURE — 80061 LIPID PANEL: CPT | Mod: ORL | Performed by: NURSE PRACTITIONER

## 2023-12-05 ENCOUNTER — PATIENT OUTREACH (OUTPATIENT)
Dept: CARE COORDINATION | Facility: CLINIC | Age: 73
End: 2023-12-05
Payer: COMMERCIAL

## 2023-12-06 ENCOUNTER — ASSISTED LIVING VISIT (OUTPATIENT)
Dept: GERIATRICS | Facility: CLINIC | Age: 73
End: 2023-12-06
Payer: COMMERCIAL

## 2023-12-06 VITALS
SYSTOLIC BLOOD PRESSURE: 138 MMHG | TEMPERATURE: 97.6 F | HEART RATE: 63 BPM | OXYGEN SATURATION: 97 % | BODY MASS INDEX: 22.13 KG/M2 | HEIGHT: 64 IN | RESPIRATION RATE: 16 BRPM | WEIGHT: 129.6 LBS | DIASTOLIC BLOOD PRESSURE: 83 MMHG

## 2023-12-06 DIAGNOSIS — G30.9 ALZHEIMER'S DEMENTIA WITH BEHAVIORAL DISTURBANCE (H): Primary | ICD-10-CM

## 2023-12-06 DIAGNOSIS — F02.818 ALZHEIMER'S DEMENTIA WITH BEHAVIORAL DISTURBANCE (H): Primary | ICD-10-CM

## 2023-12-06 DIAGNOSIS — R46.89 AGGRESSIVE BEHAVIOR: ICD-10-CM

## 2023-12-06 PROCEDURE — 99349 HOME/RES VST EST MOD MDM 40: CPT | Performed by: NURSE PRACTITIONER

## 2023-12-06 RX ORDER — OLANZAPINE 5 MG/1
TABLET ORAL
Qty: 90 TABLET | Refills: 97 | Status: SHIPPED | OUTPATIENT
Start: 2023-12-06

## 2023-12-06 NOTE — LETTER
"    12/6/2023        RE: [facility]  [address]  [address]  [place] [address]        [place]  [facility] Medical Record Number:  [id number]  Place of Service where encounter took place:  [facility]  Chief Complaint   Patient presents with     RECHECK     Increased agitation       HPI:    Marie Connolly  is a 73 year old (1950), who is being seen today for an episodic care visit.  HPI information obtained from: facility chart records, facility staff, and patient report. Today's concern is:    Resident with advanced dementia on memory care unit since 12/18/21 now needing to move rooms on same unit 11/11/23 is confused, upset and physically aggressive at times. She is now having to share a bathroom and appears upset and confused by this becoming agitated and upset with other resident is using the \"shared bathroom\". Also, continues returning to her old room on unit attempting to use that bathroom and change her clothes.     Past Medical and Surgical History reviewed in Epic today.    MEDICATIONS:    Current Outpatient Medications   Medication Sig Dispense Refill     acetaminophen (TYLENOL) 325 MG tablet TAKE TWO TABLETS (650MG) BY MOUTH TWICE DAILY;MAY ALSO TAKE TWO TABLETS (650MG) BY MOUTH TWICE DAILY AS NEEDED 720 tablet 97     amLODIPine (NORVASC) 2.5 MG tablet TAKE 1 TABLET BY MOUTH ONCE DAILY 90 tablet 97     atorvastatin (LIPITOR) 40 MG tablet Take 2 tablets (80 mg) by mouth daily 30 tablet 11     diclofenac (VOLTAREN) 1 % topical gel Apply 2 g topically daily Apply each morning to both knees. Keep in locked cabinet 100 g 3     DULoxetine (CYMBALTA) 30 MG capsule TAKE 1 CAPSULE BY MOUTH ONCE DAILY 31 capsule 11     DULoxetine HCl (DRIZALMA SPRINKLE) 30 MG CSDR Take 1 capsule (30 mg) by mouth daily 30 capsule 3     hydrocortisone, Perianal, (ANUSOL-HC) 2.5 % cream PLACE RECTALLY TWICE DAILY NEEDED 30 g 11     Multiple " "Vitamins-Minerals (ONE-A-DAY WITHIN) TABS TAKE 1 TABLET BY MOUTH ONCE DAILY 90 tablet 97     OLANZapine (ZYPREXA) 5 MG tablet TAKE ONE-HALF TABLET (2.5MG) BY MOUTH TWICE DAILY  90 tablet 97     polyethylene glycol (MIRALAX) 17 GM/Dose powder MIX 17GM OF POWDER IN 8OZ OF WATER UNTIL COMPLETELY DISSOLVED. DRINK SOLUTION BY MOUTH 3 TIMES WEEKLY. 510 g 11     SENEXON-S 8.6-50 MG tablet TAKE 1 TABLET BY MOUTH AT BEDTIME;& MAY TAKE 1 TABLET BY MOUTH TWICE DAILY AS NEEDED 270 tablet 97     Sodium Phosphates (ENEMA) 7-19 GM/118ML ENEM Place 1 enema rectally daily as needed (constipation)       VITAMIN D3 50 MCG (2000 UT) tablet TAKE 1 TABLET BY MOUTH ONCE DAILY 30 tablet 11     REVIEW OF SYSTEMS:  Limited secondary to cognitive impairment but today pt reports ok    Objective:  /83   Pulse 63   Temp 97.6  F (36.4  C)   Resp 16   Ht 1.626 m (5' 4\")   Wt 58.8 kg (129 lb 9.6 oz)   SpO2 97%   BMI 22.25 kg/m    Exam:  GENERAL APPEARANCE:  Alert, calm and pleasant   ENT:  Mouth and posterior oropharynx normal, moist mucous membranes  RESP:  respiratory effort and palpation of chest normal, lungs clear to auscultation   CV:  Palpation and auscultation of heart done , regular rate and rhythm, no murmur, rub, or gallop, trace edema, no compression   ABDOMEN:  bowel sounds, soft, nontender  M/S:   Gait and station w/o assistive device-limps at times (not as much), ROM with both knees  SKIN: limited but intact to visualized areas  NEURO:   Cranial nerves 2-12 are normal tested and grossly at patient's baseline  PSYCH:  insight and judgement impaired, memory impaired       Labs:   CBC RESULTS:   Recent Labs   Lab Test 09/21/23  0810 02/17/22  0751   WBC 4.6 4.6   RBC 4.37 4.63   HGB 14.1 14.1   HCT 44.1 45.3   * 98   MCH 32.3 30.5   MCHC 32.0 31.1*   RDW 12.4 13.5    262       Last Basic Metabolic Panel:  Recent Labs   Lab Test 09/21/23  0810 02/17/22  0751    141   POTASSIUM 3.5 3.6   CHLORIDE 108* 110* "   NERIS 9.8 9.6   CO2 25 26   BUN 10.9 12   CR 0.77 0.93   * 106*       Liver Function Studies -   Recent Labs   Lab Test 09/21/23  0810 08/25/21  1458   PROTTOTAL 7.3 7.3   ALBUMIN 4.2 3.8   BILITOTAL 0.4 0.5   ALKPHOS 46 55   AST 32 34   ALT 31 40       TSH   Date Value Ref Range Status   09/21/2023 1.15 0.30 - 4.20 uIU/mL Final   08/25/2021 1.08 0.40 - 4.00 mU/L Final       Lab Results   Component Value Date    A1C 6.3 09/21/2023     ASSESSMENT/PLAN:  (G30.9,  F02.818) Alzheimer's dementia with behavioral disturbance (H)  (primary encounter diagnosis)  (R46.89) Aggressive behavior  Comment: confusion over change of rooms on memory care unit  Plan:   -olanzapine 2.5 mg po BID and now will add prn dose. Could consider depakote 125 mg po BID (sprinkle form with dysphagia)       Electronically signed by:  JACKI Luther CNP               Sincerely,        JACKI Luther CNP

## 2023-12-29 DIAGNOSIS — I10 ESSENTIAL HYPERTENSION: ICD-10-CM

## 2023-12-29 DIAGNOSIS — E55.9 VITAMIN D DEFICIENCY: ICD-10-CM

## 2023-12-29 RX ORDER — AMLODIPINE BESYLATE 2.5 MG/1
TABLET ORAL
Qty: 28 TABLET | Refills: 97 | Status: SHIPPED | OUTPATIENT
Start: 2023-12-29

## 2024-01-05 DIAGNOSIS — U07.1 INFECTION DUE TO 2019 NOVEL CORONAVIRUS: Primary | ICD-10-CM

## 2024-01-05 NOTE — PROGRESS NOTES
COVID-19 positive and in bed not acting at baseline. VSS. With treat.      Plan:  -hold atorvastatin for 8 days  -Norvasc to every other day for 8 days then return to daily    nirmatrelvir and ritonavir (PAXLOVID) 300 mg/100 mg therapy pack Take 3 tablets by mouth 2 times daily for 5 days       JACKI Luther CNP on 1/5/2024 at 4:04 PM

## 2024-01-06 ENCOUNTER — HEALTH MAINTENANCE LETTER (OUTPATIENT)
Age: 74
End: 2024-01-06

## 2024-01-11 ENCOUNTER — DOCUMENTATION ONLY (OUTPATIENT)
Dept: GERIATRICS | Facility: CLINIC | Age: 74
End: 2024-01-11
Payer: COMMERCIAL

## 2024-01-29 DIAGNOSIS — E55.9 VITAMIN D DEFICIENCY: ICD-10-CM

## 2024-01-29 NOTE — TELEPHONE ENCOUNTER
----- Message from Prema Ramachandran MD sent at 8/26/2021 10:36 PM CDT -----  Marie, reviewed your EKG looks normal.CBC shows normal white count, normal red blood cell count, normal hemoglobin hematocrit; no anemia and normal platelet countPlease be reassured x-ray of the right wrist shows no fracture, there is some arthritis at the base of the right thumb.Dr. Ramachandran  
Called and spoke with patient and her daughter Denise, re lab results. Follow up lab only appt in 2 months and new rx for TriCor.fenofibrate at pharmacy.  Also advised Denise to fill out consent to communicate form when with her mother in the clinic.    Yun Cm RN    
Called patient, unable to leave a message.     Radha CHOWDARY RN    
Patient Contact    Attempt # 1    Was call answered?  No.  Unable to leave message.    Mailed copy of results to patient     Radha CHOWDARY RN    
No

## 2024-01-30 RX ORDER — CHOLECALCIFEROL (VITAMIN D3) 50 MCG
TABLET ORAL
Qty: 90 TABLET | Refills: 3 | Status: SHIPPED | OUTPATIENT
Start: 2024-01-30

## 2024-01-31 ENCOUNTER — ASSISTED LIVING VISIT (OUTPATIENT)
Dept: GERIATRICS | Facility: CLINIC | Age: 74
End: 2024-01-31
Payer: COMMERCIAL

## 2024-01-31 VITALS
HEART RATE: 79 BPM | SYSTOLIC BLOOD PRESSURE: 130 MMHG | TEMPERATURE: 97.6 F | RESPIRATION RATE: 16 BRPM | OXYGEN SATURATION: 98 % | DIASTOLIC BLOOD PRESSURE: 80 MMHG | HEIGHT: 64 IN | WEIGHT: 129.8 LBS | BODY MASS INDEX: 22.16 KG/M2

## 2024-01-31 DIAGNOSIS — G30.9 ALZHEIMER'S DEMENTIA WITH BEHAVIORAL DISTURBANCE (H): ICD-10-CM

## 2024-01-31 DIAGNOSIS — F33.8 OTHER RECURRENT DEPRESSIVE DISORDERS (H): ICD-10-CM

## 2024-01-31 DIAGNOSIS — E23.6 OTHER DISORDERS OF PITUITARY GLAND (H): ICD-10-CM

## 2024-01-31 DIAGNOSIS — E46 PROTEIN-CALORIE MALNUTRITION, UNSPECIFIED SEVERITY (H): ICD-10-CM

## 2024-01-31 DIAGNOSIS — F02.818 ALZHEIMER'S DEMENTIA WITH BEHAVIORAL DISTURBANCE (H): ICD-10-CM

## 2024-01-31 PROCEDURE — 99349 HOME/RES VST EST MOD MDM 40: CPT | Performed by: NURSE PRACTITIONER

## 2024-01-31 NOTE — LETTER
Marie Connolly    -BMP,CBC, LFTs for HTN  -TSH for  hypo thyroidism   -A1c for Type 2DM      Armaan Carr, APRN CNP on 1/31/2024 at 4:42 PM

## 2024-01-31 NOTE — LETTER
"    1/31/2024        RE: [facility]  [address]  [address]  [place] [address]        [facility]  [facility] Medical Record Number:  [id number]  Place of Service where encounter took place:  [facility] [[id number]]  Chief Complaint   Patient presents with     RECHECK       HPI:    Marie Connolly  is a 73 year old (1950), who is being seen today for an episodic care visit.  HPI information obtained from: facility chart records, facility staff, [facility] chart review, and family/first contact son report. Today's concern is:    Follow up to ongoing concerns with behaviors. Resident with advanced dementia on memory care unit since 12/18/21 now needing to move rooms on same unit 11/11/23 is confused, upset and physically aggressive at times. She is now having to share a bathroom and appears upset and confused by this becoming agitated and upset with other resident is using the \"shared bathroom\". Also, continues returning to her old room on unit attempting to use that bathroom and change her clothes and now is urinating and defecating near her old room. Olanzapine BID and daily prn at last visit. As needed dose used 1x. FIT test ordered but unable to complete per staff.     Family also concerns with current diet and lost dentures. They are wondering about using just on occasion for family dinners and keep dentures with them as she refuses to wear or has misplaced them on  unit. Weight stable. No changes to current diet order.    Past Medical and Surgical History reviewed in Epic today.    MEDICATIONS:    Current Outpatient Medications   Medication Sig Dispense Refill     acetaminophen (TYLENOL) 325 MG tablet TAKE TWO TABLETS (650MG) BY MOUTH TWICE DAILY;MAY ALSO TAKE TWO TABLETS (650MG) BY MOUTH TWICE DAILY AS NEEDED 720 tablet 97     amLODIPine (NORVASC) 2.5 MG tablet TAKE 1 TABLET BY MOUTH ONCE DAILY 28 tablet 97     " "atorvastatin (LIPITOR) 40 MG tablet Take 2 tablets (80 mg) by mouth daily 30 tablet 11     diclofenac (VOLTAREN) 1 % topical gel Apply 2 g topically daily Apply each morning to both knees. Keep in locked cabinet 100 g 3     DULoxetine HCl (DRIZALMA SPRINKLE) 30 MG CSDR Take 1 capsule (30 mg) by mouth daily 30 capsule 3     hydrocortisone, Perianal, (ANUSOL-HC) 2.5 % cream PLACE RECTALLY TWICE DAILY NEEDED 30 g 11     Multiple Vitamins-Minerals (ONE-A-DAY WOMENS) TABS Take 1 tablet by mouth daily 31 tablet 11     OLANZapine (ZYPREXA) 5 MG tablet TAKE ONE-HALF TABLET (2.5MG) BY MOUTH TWICE DAILY AND MAY HAVE ONCE DAILY as NEEDED 90 tablet 97     polyethylene glycol (MIRALAX) 17 GM/Dose powder MIX 17GM OF POWDER IN 8OZ OF WATER UNTIL COMPLETELY DISSOLVED. DRINK SOLUTION BY MOUTH 3 TIMES WEEKLY. 510 g 11     SENEXON-S 8.6-50 MG tablet TAKE 1 TABLET BY MOUTH AT BEDTIME;& MAY TAKE 1 TABLET BY MOUTH TWICE DAILY AS NEEDED 270 tablet 97     Sodium Phosphates (ENEMA) 7-19 GM/118ML ENEM Place 1 enema rectally daily as needed (constipation)       VITAMIN D3 50 MCG (2000 UT) tablet TAKE 1 TABLET BY MOUTH ONCE DAILY 90 tablet 3     REVIEW OF SYSTEMS:  Limited secondary to cognitive impairment but today pt reports fine    Objective:  /80   Pulse 79   Temp 97.6  F (36.4  C)   Resp 16   Ht 1.626 m (5' 4\")   Wt 58.9 kg (129 lb 12.8 oz)   SpO2 98%   BMI 22.28 kg/m    Exam:  GENERAL APPEARANCE:  Alert, calm and pleasant   ENT:  Mouth and posterior oropharynx normal, moist mucous membranes, no redness in mouth, lower teeth in   RESP:  respiratory effort and palpation of chest normal, lungs clear to auscultation   CV:  Palpation and auscultation of heart done , regular rate and rhythm, no murmur, rub, or gallop, trace edema, no compression   ABDOMEN:  bowel sounds, soft, nontender  M/S:   Gait and station w/o assistive device-limps at times (not as much), ROM with both knees  SKIN: limited but intact to visualized " areas  NEURO:   Cranial nerves 2-12 are normal tested and grossly at patient's baseline  PSYCH:  insight and judgement impaired, memory impaired     Labs:   CBC RESULTS:   Recent Labs   Lab Test 09/21/23  0810 02/17/22  0751   WBC 4.6 4.6   RBC 4.37 4.63   HGB 14.1 14.1   HCT 44.1 45.3   * 98   MCH 32.3 30.5   MCHC 32.0 31.1*   RDW 12.4 13.5    262       Last Basic Metabolic Panel:  Recent Labs   Lab Test 09/21/23  0810 02/17/22  0751    141   POTASSIUM 3.5 3.6   CHLORIDE 108* 110*   NERIS 9.8 9.6   CO2 25 26   BUN 10.9 12   CR 0.77 0.93   * 106*       Liver Function Studies -   Recent Labs   Lab Test 09/21/23  0810 08/25/21  1458   PROTTOTAL 7.3 7.3   ALBUMIN 4.2 3.8   BILITOTAL 0.4 0.5   ALKPHOS 46 55   AST 32 34   ALT 31 40       TSH   Date Value Ref Range Status   09/21/2023 1.15 0.30 - 4.20 uIU/mL Final   08/25/2021 1.08 0.40 - 4.00 mU/L Final       Lab Results   Component Value Date    A1C 6.3 09/21/2023     ASSESSMENT/PLAN:  (E46) Protein-calorie malnutrition, unspecified severity (H24)  Comment: weight table   Plan:   -follow weights  -no issues with eating and no upper dentures in     (G30.9,  F02.818) Alzheimer's dementia with behavioral disturbance (H)  (F33.8) Other recurrent depressive disorders (H24)  Comment: mostly stable given circumstances of room change on unit  Plan:   -continue current plan of care    (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               Sincerely,        JACKI Luther CNP

## 2024-01-31 NOTE — PROGRESS NOTES
"[place] [facility]  [facility] Medical Record Number:  [id number]  Place of Service where encounter took place:  [facility] [[id number]]  Chief Complaint   Patient presents with    RECHECK       HPI:    Marie Connolly  is a 73 year old (1950), who is being seen today for an episodic care visit.  HPI information obtained from: facility chart records, facility staff, [facility] chart review, and family/first contact son report. Today's concern is:    Follow up to ongoing concerns with behaviors. Resident with advanced dementia on memory care unit since 12/18/21 now needing to move rooms on same unit 11/11/23 is confused, upset and physically aggressive at times. She is now having to share a bathroom and appears upset and confused by this becoming agitated and upset with other resident is using the \"shared bathroom\". Also, continues returning to her old room on unit attempting to use that bathroom and change her clothes and now is urinating and defecating near her old room. Olanzapine BID and daily prn at last visit. As needed dose used 1x. FIT test ordered but unable to complete per staff.     Family also concerns with current diet and lost dentures. They are wondering about using just on occasion for family dinners and keep dentures with them as she refuses to wear or has misplaced them on  unit. Weight stable. No changes to current diet order.    Past Medical and Surgical History reviewed in Epic today.    MEDICATIONS:    Current Outpatient Medications   Medication Sig Dispense Refill    acetaminophen (TYLENOL) 325 MG tablet TAKE TWO TABLETS (650MG) BY MOUTH TWICE DAILY;MAY ALSO TAKE TWO TABLETS (650MG) BY MOUTH TWICE DAILY AS NEEDED 720 tablet 97    amLODIPine (NORVASC) 2.5 MG tablet TAKE 1 TABLET BY MOUTH ONCE DAILY 28 tablet 97    atorvastatin (LIPITOR) 40 MG tablet Take 2 tablets (80 mg) by mouth daily 30 tablet 11    diclofenac (VOLTAREN) 1 % topical gel Apply 2 g " "topically daily Apply each morning to both knees. Keep in locked cabinet 100 g 3    DULoxetine HCl (DRIZALMA SPRINKLE) 30 MG CSDR Take 1 capsule (30 mg) by mouth daily 30 capsule 3    hydrocortisone, Perianal, (ANUSOL-HC) 2.5 % cream PLACE RECTALLY TWICE DAILY NEEDED 30 g 11    Multiple Vitamins-Minerals (ONE-A-DAY WOMENS) TABS Take 1 tablet by mouth daily 31 tablet 11    OLANZapine (ZYPREXA) 5 MG tablet TAKE ONE-HALF TABLET (2.5MG) BY MOUTH TWICE DAILY AND MAY HAVE ONCE DAILY as NEEDED 90 tablet 97    polyethylene glycol (MIRALAX) 17 GM/Dose powder MIX 17GM OF POWDER IN 8OZ OF WATER UNTIL COMPLETELY DISSOLVED. DRINK SOLUTION BY MOUTH 3 TIMES WEEKLY. 510 g 11    SENEXON-S 8.6-50 MG tablet TAKE 1 TABLET BY MOUTH AT BEDTIME;& MAY TAKE 1 TABLET BY MOUTH TWICE DAILY AS NEEDED 270 tablet 97    Sodium Phosphates (ENEMA) 7-19 GM/118ML ENEM Place 1 enema rectally daily as needed (constipation)      VITAMIN D3 50 MCG (2000 UT) tablet TAKE 1 TABLET BY MOUTH ONCE DAILY 90 tablet 3     REVIEW OF SYSTEMS:  Limited secondary to cognitive impairment but today pt reports fine    Objective:  /80   Pulse 79   Temp 97.6  F (36.4  C)   Resp 16   Ht 1.626 m (5' 4\")   Wt 58.9 kg (129 lb 12.8 oz)   SpO2 98%   BMI 22.28 kg/m    Exam:  GENERAL APPEARANCE:  Alert, calm and pleasant   ENT:  Mouth and posterior oropharynx normal, moist mucous membranes, no redness in mouth, lower teeth in   RESP:  respiratory effort and palpation of chest normal, lungs clear to auscultation   CV:  Palpation and auscultation of heart done , regular rate and rhythm, no murmur, rub, or gallop, trace edema, no compression   ABDOMEN:  bowel sounds, soft, nontender  M/S:   Gait and station w/o assistive device-limps at times (not as much), ROM with both knees  SKIN: limited but intact to visualized areas  NEURO:   Cranial nerves 2-12 are normal tested and grossly at patient's baseline  PSYCH:  insight and judgement impaired, memory impaired     Labs: "   CBC RESULTS:   Recent Labs   Lab Test 09/21/23  0810 02/17/22  0751   WBC 4.6 4.6   RBC 4.37 4.63   HGB 14.1 14.1   HCT 44.1 45.3   * 98   MCH 32.3 30.5   MCHC 32.0 31.1*   RDW 12.4 13.5    262       Last Basic Metabolic Panel:  Recent Labs   Lab Test 09/21/23  0810 02/17/22  0751    141   POTASSIUM 3.5 3.6   CHLORIDE 108* 110*   NERIS 9.8 9.6   CO2 25 26   BUN 10.9 12   CR 0.77 0.93   * 106*       Liver Function Studies -   Recent Labs   Lab Test 09/21/23  0810 08/25/21  1458   PROTTOTAL 7.3 7.3   ALBUMIN 4.2 3.8   BILITOTAL 0.4 0.5   ALKPHOS 46 55   AST 32 34   ALT 31 40       TSH   Date Value Ref Range Status   09/21/2023 1.15 0.30 - 4.20 uIU/mL Final   08/25/2021 1.08 0.40 - 4.00 mU/L Final       Lab Results   Component Value Date    A1C 6.3 09/21/2023     ASSESSMENT/PLAN:  (E46) Protein-calorie malnutrition, unspecified severity (H24)  Comment: weight table   Plan:   -follow weights  -no issues with eating and no upper dentures in     (G30.9,  F02.818) Alzheimer's dementia with behavioral disturbance (H)  (F33.8) Other recurrent depressive disorders (H24)  Comment: mostly stable given circumstances of room change on unit  Plan:   -continue current plan of care    (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

## 2024-02-07 ENCOUNTER — LAB REQUISITION (OUTPATIENT)
Dept: LAB | Facility: CLINIC | Age: 74
End: 2024-02-07
Payer: COMMERCIAL

## 2024-02-07 DIAGNOSIS — I10 ESSENTIAL (PRIMARY) HYPERTENSION: ICD-10-CM

## 2024-02-07 DIAGNOSIS — E11.9 TYPE 2 DIABETES MELLITUS WITHOUT COMPLICATIONS (H): ICD-10-CM

## 2024-02-07 DIAGNOSIS — E03.9 HYPOTHYROIDISM, UNSPECIFIED: ICD-10-CM

## 2024-02-08 LAB
ERYTHROCYTE [DISTWIDTH] IN BLOOD BY AUTOMATED COUNT: 13.2 % (ref 10–15)
HBA1C MFR BLD: 6.4 %
HCT VFR BLD AUTO: 41.9 % (ref 35–47)
HGB BLD-MCNC: 13.7 G/DL (ref 11.7–15.7)
MCH RBC QN AUTO: 31.9 PG (ref 26.5–33)
MCHC RBC AUTO-ENTMCNC: 32.7 G/DL (ref 31.5–36.5)
MCV RBC AUTO: 97 FL (ref 78–100)
PLATELET # BLD AUTO: 227 10E3/UL (ref 150–450)
RBC # BLD AUTO: 4.3 10E6/UL (ref 3.8–5.2)
WBC # BLD AUTO: 5.9 10E3/UL (ref 4–11)

## 2024-02-08 PROCEDURE — 84443 ASSAY THYROID STIM HORMONE: CPT | Mod: ORL | Performed by: NURSE PRACTITIONER

## 2024-02-08 PROCEDURE — P9604 ONE-WAY ALLOW PRORATED TRIP: HCPCS | Mod: ORL | Performed by: NURSE PRACTITIONER

## 2024-02-08 PROCEDURE — 80053 COMPREHEN METABOLIC PANEL: CPT | Mod: ORL | Performed by: NURSE PRACTITIONER

## 2024-02-08 PROCEDURE — 82248 BILIRUBIN DIRECT: CPT | Mod: ORL | Performed by: NURSE PRACTITIONER

## 2024-02-08 PROCEDURE — 36415 COLL VENOUS BLD VENIPUNCTURE: CPT | Mod: ORL | Performed by: NURSE PRACTITIONER

## 2024-02-08 PROCEDURE — 85027 COMPLETE CBC AUTOMATED: CPT | Mod: ORL | Performed by: NURSE PRACTITIONER

## 2024-02-08 PROCEDURE — 83036 HEMOGLOBIN GLYCOSYLATED A1C: CPT | Mod: ORL | Performed by: NURSE PRACTITIONER

## 2024-02-09 LAB
ALBUMIN SERPL BCG-MCNC: 4.3 G/DL (ref 3.5–5.2)
ALP SERPL-CCNC: 53 U/L (ref 40–150)
ALT SERPL W P-5'-P-CCNC: 46 U/L (ref 0–50)
ANION GAP SERPL CALCULATED.3IONS-SCNC: 13 MMOL/L (ref 7–15)
AST SERPL W P-5'-P-CCNC: 40 U/L (ref 0–45)
BILIRUB DIRECT SERPL-MCNC: <0.2 MG/DL (ref 0–0.3)
BILIRUB SERPL-MCNC: 0.5 MG/DL
BUN SERPL-MCNC: 9.3 MG/DL (ref 8–23)
CALCIUM SERPL-MCNC: 10 MG/DL (ref 8.8–10.2)
CHLORIDE SERPL-SCNC: 107 MMOL/L (ref 98–107)
CREAT SERPL-MCNC: 0.61 MG/DL (ref 0.51–0.95)
DEPRECATED HCO3 PLAS-SCNC: 23 MMOL/L (ref 22–29)
EGFRCR SERPLBLD CKD-EPI 2021: >90 ML/MIN/1.73M2
GLUCOSE SERPL-MCNC: 101 MG/DL (ref 70–99)
POTASSIUM SERPL-SCNC: 3.9 MMOL/L (ref 3.4–5.3)
PROT SERPL-MCNC: 6.8 G/DL (ref 6.4–8.3)
SODIUM SERPL-SCNC: 143 MMOL/L (ref 135–145)
TSH SERPL DL<=0.005 MIU/L-ACNC: 0.92 UIU/ML (ref 0.3–4.2)

## 2024-02-16 DIAGNOSIS — M25.562 LEFT KNEE PAIN, UNSPECIFIED CHRONICITY: ICD-10-CM

## 2024-02-20 VITALS
HEART RATE: 56 BPM | HEIGHT: 64 IN | WEIGHT: 124.2 LBS | OXYGEN SATURATION: 98 % | RESPIRATION RATE: 26 BRPM | DIASTOLIC BLOOD PRESSURE: 37 MMHG | TEMPERATURE: 97.8 F | BODY MASS INDEX: 21.21 KG/M2 | SYSTOLIC BLOOD PRESSURE: 140 MMHG

## 2024-02-20 PROBLEM — R46.89 AGGRESSIVE BEHAVIOR: Status: ACTIVE | Noted: 2024-02-20

## 2024-02-20 PROBLEM — F03.918: Status: ACTIVE | Noted: 2024-02-20

## 2024-02-20 NOTE — PROGRESS NOTES
[facility]  [place] Medical Record Number:  [id number]  Place of Service where encounter took place:  [facility] [[id number]]  Chief Complaint   Patient presents with    RECHECK       HPI:    Marie Connolly  is a 73 year old (1950), who is being seen today for an episodic care visit.  HPI information obtained from: facility chart records, facility staff, patient report, and [facility] chart review. Today's concern is:    Seen today for follow up to dementia related behaviors. Resident with history of physical aggressive behaviors towards other residents and staff on [facility] unit including hitting, pushing. She exhibits territorial behavior with delusions of possessions and space on the unit. Olanzapine BID and daily prn (prn used infrequently) but needed if she quickly escalates to avoid further harm to staff or residents.     Also appears may have missed Influenza/covid vaccine. She did have COVID 1/5/24 was treated with Paxlovid.     Past Medical and Surgical History reviewed in Epic today.    MEDICATIONS:    Current Outpatient Medications   Medication Sig Dispense Refill    acetaminophen (TYLENOL) 325 MG tablet TAKE TWO TABLETS (650MG) BY MOUTH TWICE DAILY;MAY ALSO TAKE TWO TABLETS (650MG) BY MOUTH TWICE DAILY AS NEEDED 720 tablet 97    amLODIPine (NORVASC) 2.5 MG tablet TAKE 1 TABLET BY MOUTH ONCE DAILY 28 tablet 97    atorvastatin (LIPITOR) 40 MG tablet Take 2 tablets (80 mg) by mouth daily 30 tablet 11    diclofenac (VOLTAREN) 1 % topical gel APPLY 2 GRAMS TOPICALLY TO BOTH KNEES DAILY EACH MORNING (KEEP IN LOCKED CABINET) 100 g 11    DULoxetine HCl (DRIZALMA SPRINKLE) 30 MG CSDR Take 1 capsule (30 mg) by mouth daily 30 capsule 3    hydrocortisone, Perianal, (ANUSOL-HC) 2.5 % cream PLACE RECTALLY TWICE DAILY NEEDED 30 g 11    Multiple Vitamins-Minerals (ONE-A-DAY WOMENS) TABS Take 1 tablet by mouth daily 31 tablet 11    OLANZapine (ZYPREXA) 5 MG tablet TAKE  "ONE-HALF TABLET (2.5MG) BY MOUTH TWICE DAILY AND MAY HAVE ONCE DAILY as NEEDED 90 tablet 97    polyethylene glycol (MIRALAX) 17 GM/Dose powder MIX 17GM OF POWDER IN 8OZ OF WATER UNTIL COMPLETELY DISSOLVED. DRINK SOLUTION BY MOUTH 3 TIMES WEEKLY. 510 g 11    SENEXON-S 8.6-50 MG tablet TAKE 1 TABLET BY MOUTH AT BEDTIME;& MAY TAKE 1 TABLET BY MOUTH TWICE DAILY AS NEEDED 270 tablet 97    Sodium Phosphates (ENEMA) 7-19 GM/118ML ENEM Place 1 enema rectally daily as needed (constipation)      VITAMIN D3 50 MCG (2000 UT) tablet TAKE 1 TABLET BY MOUTH ONCE DAILY 90 tablet 3     REVIEW OF SYSTEMS:  Limited secondary to cognitive impairment but today pt reports ok    Objective:  BP (!) 140/37   Pulse 56   Temp 97.8  F (36.6  C)   Resp 26   Ht 1.626 m (5' 4\")   Wt 56.3 kg (124 lb 3.2 oz)   SpO2 98%   BMI 21.32 kg/m    Exam:  GENERAL APPEARANCE:  Alert, calm and pleasant   ENT:  Mouth and posterior oropharynx normal, moist mucous membranes, no redness in mouth, lower teeth in   RESP:  respiratory effort and palpation of chest normal, lungs clear to auscultation   CV:  Palpation and auscultation of heart done , regular rate and rhythm, no murmur, rub, or gallop, trace edema  ABDOMEN:  bowel sounds, soft, nontender  M/S:   Gait and station w/o assistive device-limps at times (not as much), ROM with both knees  SKIN: limited but intact to visualized areas  NEURO:   Cranial nerves 2-12 are normal tested and grossly at patient's baseline  PSYCH:  insight and judgement impaired, memory impaired     Labs:   Recent labs in [facility] reviewed by me today.     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

## 2024-02-21 ENCOUNTER — ASSISTED LIVING VISIT (OUTPATIENT)
Dept: GERIATRICS | Facility: CLINIC | Age: 74
End: 2024-02-21
Payer: COMMERCIAL

## 2024-02-21 DIAGNOSIS — Z71.85 VACCINE COUNSELING: ICD-10-CM

## 2024-02-21 DIAGNOSIS — F03.918 PSYCHOSIS IN ELDERLY WITH BEHAVIORAL DISTURBANCE (H): ICD-10-CM

## 2024-02-21 DIAGNOSIS — G30.9 ALZHEIMER'S DEMENTIA WITH BEHAVIORAL DISTURBANCE (H): Primary | ICD-10-CM

## 2024-02-21 DIAGNOSIS — F02.818 ALZHEIMER'S DEMENTIA WITH BEHAVIORAL DISTURBANCE (H): Primary | ICD-10-CM

## 2024-02-21 DIAGNOSIS — R46.89 AGGRESSIVE BEHAVIOR: ICD-10-CM

## 2024-02-21 PROCEDURE — 99349 HOME/RES VST EST MOD MDM 40: CPT | Performed by: NURSE PRACTITIONER

## 2024-02-21 NOTE — LETTER
2/21/2024        RE: [facility]  [address]  [address]  [place] [address]        [facility]  [place] Medical Record Number:  [id number]  Place of Service where encounter took place:  [facility] [[id number]]  Chief Complaint   Patient presents with     RECHECK       HPI:    Marie Connolly  is a 73 year old (1950), who is being seen today for an episodic care visit.  HPI information obtained from: facility chart records, facility staff, patient report, and [facility] chart review. Today's concern is:    Seen today for follow up to dementia related behaviors. Resident with history of physical aggressive behaviors towards other residents and staff on  unit including hitting, pushing. She exhibits territorial behavior with delusions of possessions and space on the unit. Olanzapine BID and daily prn (prn used infrequently) but needed if she quickly escalates to avoid further harm to staff or residents.     Also appears may have missed Influenza/covid vaccine. She did have COVID 1/5/24 was treated with Paxlovid.     Past Medical and Surgical History reviewed in Epic today.    MEDICATIONS:    Current Outpatient Medications   Medication Sig Dispense Refill     acetaminophen (TYLENOL) 325 MG tablet TAKE TWO TABLETS (650MG) BY MOUTH TWICE DAILY;MAY ALSO TAKE TWO TABLETS (650MG) BY MOUTH TWICE DAILY AS NEEDED 720 tablet 97     amLODIPine (NORVASC) 2.5 MG tablet TAKE 1 TABLET BY MOUTH ONCE DAILY 28 tablet 97     atorvastatin (LIPITOR) 40 MG tablet Take 2 tablets (80 mg) by mouth daily 30 tablet 11     diclofenac (VOLTAREN) 1 % topical gel APPLY 2 GRAMS TOPICALLY TO BOTH KNEES DAILY EACH MORNING (KEEP IN LOCKED CABINET) 100 g 11     DULoxetine HCl (DRIZALMA SPRINKLE) 30 MG CSDR Take 1 capsule (30 mg) by mouth daily 30 capsule 3     hydrocortisone, Perianal, (ANUSOL-HC) 2.5 % cream PLACE RECTALLY TWICE DAILY NEEDED 30 g 11      "Multiple Vitamins-Minerals (ONE-A-DAY WOMENS) TABS Take 1 tablet by mouth daily 31 tablet 11     OLANZapine (ZYPREXA) 5 MG tablet TAKE ONE-HALF TABLET (2.5MG) BY MOUTH TWICE DAILY AND MAY HAVE ONCE DAILY as NEEDED 90 tablet 97     polyethylene glycol (MIRALAX) 17 GM/Dose powder MIX 17GM OF POWDER IN 8OZ OF WATER UNTIL COMPLETELY DISSOLVED. DRINK SOLUTION BY MOUTH 3 TIMES WEEKLY. 510 g 11     SENEXON-S 8.6-50 MG tablet TAKE 1 TABLET BY MOUTH AT BEDTIME;& MAY TAKE 1 TABLET BY MOUTH TWICE DAILY AS NEEDED 270 tablet 97     Sodium Phosphates (ENEMA) 7-19 GM/118ML ENEM Place 1 enema rectally daily as needed (constipation)       VITAMIN D3 50 MCG (2000 UT) tablet TAKE 1 TABLET BY MOUTH ONCE DAILY 90 tablet 3     REVIEW OF SYSTEMS:  Limited secondary to cognitive impairment but today pt reports ok    Objective:  BP (!) 140/37   Pulse 56   Temp 97.8  F (36.6  C)   Resp 26   Ht 1.626 m (5' 4\")   Wt 56.3 kg (124 lb 3.2 oz)   SpO2 98%   BMI 21.32 kg/m    Exam:  GENERAL APPEARANCE:  Alert, calm and pleasant   ENT:  Mouth and posterior oropharynx normal, moist mucous membranes, no redness in mouth, lower teeth in   RESP:  respiratory effort and palpation of chest normal, lungs clear to auscultation   CV:  Palpation and auscultation of heart done , regular rate and rhythm, no murmur, rub, or gallop, trace edema  ABDOMEN:  bowel sounds, soft, nontender  M/S:   Gait and station w/o assistive device-limps at times (not as much), ROM with both knees  SKIN: limited but intact to visualized areas  NEURO:   Cranial nerves 2-12 are normal tested and grossly at patient's baseline  PSYCH:  insight and judgement impaired, memory impaired     Labs:   Recent labs in [facility] reviewed by me today.     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

## 2024-02-21 NOTE — LETTER
Marie Connolly orders:    Please give when vaccine is onsite:   influenza recomb quadrivalent PF (FLUBLOK) 0.5 ML injection Inject 0.5 mLs into the muscle once for 1 dose       JACKI Luther CNP on 2/23/2024 at 12:01 PM

## 2024-02-23 ENCOUNTER — PATIENT OUTREACH (OUTPATIENT)
Dept: CARE COORDINATION | Facility: CLINIC | Age: 74
End: 2024-02-23
Payer: COMMERCIAL

## 2024-03-16 ENCOUNTER — HEALTH MAINTENANCE LETTER (OUTPATIENT)
Age: 74
End: 2024-03-16

## 2024-03-22 ENCOUNTER — PATIENT OUTREACH (OUTPATIENT)
Dept: CARE COORDINATION | Facility: CLINIC | Age: 74
End: 2024-03-22
Payer: COMMERCIAL

## 2024-03-26 DIAGNOSIS — M25.562 LEFT KNEE PAIN, UNSPECIFIED CHRONICITY: ICD-10-CM

## 2024-03-26 RX ORDER — ACETAMINOPHEN 325 MG/1
TABLET ORAL
Qty: 720 TABLET | Refills: 11 | Status: SHIPPED | OUTPATIENT
Start: 2024-03-26

## 2024-03-29 DIAGNOSIS — K59.00 CONSTIPATION: Primary | ICD-10-CM

## 2024-03-29 RX ORDER — SODIUM PHOSPHATE,MONO-DIBASIC 19G-7G/118
ENEMA (ML) RECTAL
Qty: 133 ML | Refills: 97 | Status: SHIPPED | OUTPATIENT
Start: 2024-03-29

## 2024-05-06 ENCOUNTER — ASSISTED LIVING VISIT (OUTPATIENT)
Dept: GERIATRICS | Facility: CLINIC | Age: 74
End: 2024-05-06
Payer: COMMERCIAL

## 2024-05-06 VITALS
HEIGHT: 64 IN | WEIGHT: 122 LBS | BODY MASS INDEX: 20.83 KG/M2 | SYSTOLIC BLOOD PRESSURE: 124 MMHG | TEMPERATURE: 97.2 F | RESPIRATION RATE: 18 BRPM | HEART RATE: 72 BPM | OXYGEN SATURATION: 97 % | DIASTOLIC BLOOD PRESSURE: 88 MMHG

## 2024-05-06 DIAGNOSIS — R46.89 AGGRESSIVE BEHAVIOR: ICD-10-CM

## 2024-05-06 DIAGNOSIS — E46 PROTEIN-CALORIE MALNUTRITION, UNSPECIFIED SEVERITY (H): ICD-10-CM

## 2024-05-06 DIAGNOSIS — F02.818 ALZHEIMER'S DEMENTIA WITH BEHAVIORAL DISTURBANCE (H): Primary | ICD-10-CM

## 2024-05-06 DIAGNOSIS — K59.01 SLOW TRANSIT CONSTIPATION: ICD-10-CM

## 2024-05-06 DIAGNOSIS — G30.9 ALZHEIMER'S DEMENTIA WITH BEHAVIORAL DISTURBANCE (H): Primary | ICD-10-CM

## 2024-05-06 DIAGNOSIS — F03.918 PSYCHOSIS IN ELDERLY WITH BEHAVIORAL DISTURBANCE (H): ICD-10-CM

## 2024-05-06 PROCEDURE — 99349 HOME/RES VST EST MOD MDM 40: CPT | Performed by: NURSE PRACTITIONER

## 2024-05-06 NOTE — LETTER
5/6/2024        RE: [facility]  [address]  [address]  [place] [address]        [facility]  [place] Medical Record Number:  [id number]  Place of Service where encounter took place:  [facility] [[id number]]  Chief Complaint   Patient presents with     RECHECK       HPI:    Marie Connolly  is a 73 year old (1950), who is being seen today for an episodic care visit.  HPI information obtained from: facility chart records, facility staff, patient report, and [facility] chart review. Today's concern is:    Seen today for follow up to dementia related behaviors and chronic disease management.  Resident with history of physical aggressive behaviors towards other residents and staff on  unit including hitting, pushing. She exhibits territorial behavior with delusions of possessions and space on the unit. Olanzapine BID and daily prn (prn used infrequently last 1/2024). Needed if she quickly escalates to avoid further harm to staff or residents but appears with advancing dementia, less aggressive. Slow weight loss is most likely related to advancing dementia. Following along mostly in exercise class today, calm and pleasant. More flat affect.     Past Medical and Surgical History reviewed in Epic today.    MEDICATIONS:  Current Outpatient Medications   Medication Sig Dispense Refill     acetaminophen (TYLENOL) 325 MG tablet TAKE 2 TABLETS (650MG) BY MOUTH TWICE DAILY;AND MAY ALSO TAKE TWO TABLETS (650 MG) TWICE DAILY AS NEEDED 720 tablet 11     amLODIPine (NORVASC) 2.5 MG tablet TAKE 1 TABLET BY MOUTH ONCE DAILY 28 tablet 97     atorvastatin (LIPITOR) 40 MG tablet Take 2 tablets (80 mg) by mouth daily 30 tablet 11     diclofenac (VOLTAREN) 1 % topical gel APPLY 2 GRAMS TOPICALLY TO BOTH KNEES DAILY EACH MORNING (KEEP IN LOCKED CABINET) 100 g 11     DULoxetine HCl (DRIZALMA SPRINKLE) 30 MG CSDR Take 1 capsule (30 mg) by mouth  "daily 30 capsule 3     hydrocortisone, Perianal, (ANUSOL-HC) 2.5 % cream PLACE RECTALLY TWICE DAILY NEEDED 30 g 11     Multiple Vitamins-Minerals (ONE-A-DAY WOMENS) TABS Take 1 tablet by mouth daily 31 tablet 11     OLANZapine (ZYPREXA) 5 MG tablet TAKE ONE-HALF TABLET (2.5MG) BY MOUTH TWICE DAILY AND MAY HAVE ONCE DAILY as NEEDED 90 tablet 97     polyethylene glycol (MIRALAX) 17 GM/Dose powder MIX 17GM OF POWDER IN 8OZ OF WATER UNTIL COMPLETELY DISSOLVED. DRINK SOLUTION BY MOUTH 3 TIMES WEEKLY. 510 g 11     SENEXON-S 8.6-50 MG tablet TAKE 1 TABLET BY MOUTH AT BEDTIME;& MAY TAKE 1 TABLET BY MOUTH TWICE DAILY AS NEEDED 270 tablet 97     Sodium Phosphates (ENEMA) 7-19 GM/118ML ENEM PLACE 1 ENEMA RECTALLY ONCE DAILY AS NEEDED FOR CONSTIPATION 133 mL 97     VITAMIN D3 50 MCG (2000 UT) tablet TAKE 1 TABLET BY MOUTH ONCE DAILY 90 tablet 3     REVIEW OF SYSTEMS:  Limited secondary to cognitive impairment but today pt reports ok    Objective:  /88   Pulse 72   Temp 97.2  F (36.2  C)   Resp 18   Ht 1.626 m (5' 4\")   Wt 55.3 kg (122 lb)   SpO2 97%   BMI 20.94 kg/m    Exam:  GENERAL APPEARANCE:  Alert, calm and pleasant, more flat  ENT:  Mouth and posterior oropharynx normal, moist mucous membranes  RESP:  respiratory effort and palpation of chest normal, lungs clear to auscultation   CV:  Palpation and auscultation of heart done , regular rate and rhythm, 3/6 murmur, rub, or gallop, trace edema  ABDOMEN:  bowel sounds, soft, nontender  M/S:   Gait and station w/o assistive device-limps at times (not as much), ROM with both knees  SKIN: limited but intact to visualized areas  NEURO:   Cranial nerves 2-12 are normal tested and grossly at patient's baseline  PSYCH:  insight and judgement impaired, memory impaired     Labs:   CBC RESULTS:   Recent Labs   Lab Test 02/08/24  1209 09/21/23  0810   WBC 5.9 4.6   RBC 4.30 4.37   HGB 13.7 14.1   HCT 41.9 44.1   MCV 97 101*   MCH 31.9 32.3   MCHC 32.7 32.0   RDW 13.2 12.4 "    257       Last Basic Metabolic Panel:  Recent Labs   Lab Test 02/08/24  1209 09/21/23  0810    145   POTASSIUM 3.9 3.5   CHLORIDE 107 108*   NERIS 10.0 9.8   CO2 23 25   BUN 9.3 10.9   CR 0.61 0.77   * 104*       Liver Function Studies -   Recent Labs   Lab Test 02/08/24  1209 09/21/23  0810   PROTTOTAL 6.8 7.3   ALBUMIN 4.3 4.2   BILITOTAL 0.5 0.4   ALKPHOS 53 46   AST 40 32   ALT 46 31       TSH   Date Value Ref Range Status   02/08/2024 0.92 0.30 - 4.20 uIU/mL Final   09/21/2023 1.15 0.30 - 4.20 uIU/mL Final   08/25/2021 1.08 0.40 - 4.00 mU/L Final       Lab Results   Component Value Date    A1C 6.4 02/08/2024    A1C 6.3 09/21/2023     ASSESSMENT/PLAN:  (G30.9,  F02.818) Alzheimer's dementia with behavioral disturbance (H)  (primary encounter diagnosis)  (F03.918) 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

## 2024-05-06 NOTE — LETTER
Marie Connolly orders:     -move Norvasc to  dosing         Armaan Carr, APRN CNP on 5/6/2024 at 4:21 PM

## 2024-05-06 NOTE — PROGRESS NOTES
[facility]  [place] Medical Record Number:  [id number]  Place of Service where encounter took place:  [facility] [[id number]]  Chief Complaint   Patient presents with    RECHECK       HPI:    Marie Connolly  is a 73 year old (1950), who is being seen today for an episodic care visit.  HPI information obtained from: facility chart records, facility staff, patient report, and [facility] chart review. Today's concern is:    Seen today for follow up to dementia related behaviors and chronic disease management.  Resident with history of physical aggressive behaviors towards other residents and staff on  unit including hitting, pushing. She exhibits territorial behavior with delusions of possessions and space on the unit. Olanzapine BID and daily prn (prn used infrequently last 1/2024). Needed if she quickly escalates to avoid further harm to staff or residents but appears with advancing dementia, less aggressive. Slow weight loss is most likely related to advancing dementia. Following along mostly in exercise class today, calm and pleasant. More flat affect.     Past Medical and Surgical History reviewed in Epic today.    MEDICATIONS:  Current Outpatient Medications   Medication Sig Dispense Refill    acetaminophen (TYLENOL) 325 MG tablet TAKE 2 TABLETS (650MG) BY MOUTH TWICE DAILY;AND MAY ALSO TAKE TWO TABLETS (650 MG) TWICE DAILY AS NEEDED 720 tablet 11    amLODIPine (NORVASC) 2.5 MG tablet TAKE 1 TABLET BY MOUTH ONCE DAILY 28 tablet 97    atorvastatin (LIPITOR) 40 MG tablet Take 2 tablets (80 mg) by mouth daily 30 tablet 11    diclofenac (VOLTAREN) 1 % topical gel APPLY 2 GRAMS TOPICALLY TO BOTH KNEES DAILY EACH MORNING (KEEP IN LOCKED CABINET) 100 g 11    DULoxetine HCl (DRIZALMA SPRINKLE) 30 MG CSDR Take 1 capsule (30 mg) by mouth daily 30 capsule 3    hydrocortisone, Perianal, (ANUSOL-HC) 2.5 % cream PLACE RECTALLY TWICE DAILY NEEDED 30 g 11    Multiple  "Vitamins-Minerals (ONE-A-DAY WOMENS) TABS Take 1 tablet by mouth daily 31 tablet 11    OLANZapine (ZYPREXA) 5 MG tablet TAKE ONE-HALF TABLET (2.5MG) BY MOUTH TWICE DAILY AND MAY HAVE ONCE DAILY as NEEDED 90 tablet 97    polyethylene glycol (MIRALAX) 17 GM/Dose powder MIX 17GM OF POWDER IN 8OZ OF WATER UNTIL COMPLETELY DISSOLVED. DRINK SOLUTION BY MOUTH 3 TIMES WEEKLY. 510 g 11    SENEXON-S 8.6-50 MG tablet TAKE 1 TABLET BY MOUTH AT BEDTIME;& MAY TAKE 1 TABLET BY MOUTH TWICE DAILY AS NEEDED 270 tablet 97    Sodium Phosphates (ENEMA) 7-19 GM/118ML ENEM PLACE 1 ENEMA RECTALLY ONCE DAILY AS NEEDED FOR CONSTIPATION 133 mL 97    VITAMIN D3 50 MCG (2000 UT) tablet TAKE 1 TABLET BY MOUTH ONCE DAILY 90 tablet 3     REVIEW OF SYSTEMS:  Limited secondary to cognitive impairment but today pt reports ok    Objective:  /88   Pulse 72   Temp 97.2  F (36.2  C)   Resp 18   Ht 1.626 m (5' 4\")   Wt 55.3 kg (122 lb)   SpO2 97%   BMI 20.94 kg/m    Exam:  GENERAL APPEARANCE:  Alert, calm and pleasant, more flat  ENT:  Mouth and posterior oropharynx normal, moist mucous membranes  RESP:  respiratory effort and palpation of chest normal, lungs clear to auscultation   CV:  Palpation and auscultation of heart done , regular rate and rhythm, 3/6 murmur, rub, or gallop, trace edema  ABDOMEN:  bowel sounds, soft, nontender  M/S:   Gait and station w/o assistive device-limps at times (not as much), ROM with both knees  SKIN: limited but intact to visualized areas  NEURO:   Cranial nerves 2-12 are normal tested and grossly at patient's baseline  PSYCH:  insight and judgement impaired, memory impaired     Labs:   CBC RESULTS:   Recent Labs   Lab Test 02/08/24  1209 09/21/23  0810   WBC 5.9 4.6   RBC 4.30 4.37   HGB 13.7 14.1   HCT 41.9 44.1   MCV 97 101*   MCH 31.9 32.3   MCHC 32.7 32.0   RDW 13.2 12.4    257       Last Basic Metabolic Panel:  Recent Labs   Lab Test 02/08/24  1209 09/21/23  0810    145   POTASSIUM 3.9 3.5 "   CHLORIDE 107 108*   NERIS 10.0 9.8   CO2 23 25   BUN 9.3 10.9   CR 0.61 0.77   * 104*       Liver Function Studies -   Recent Labs   Lab Test 02/08/24  1209 09/21/23  0810   PROTTOTAL 6.8 7.3   ALBUMIN 4.3 4.2   BILITOTAL 0.5 0.4   ALKPHOS 53 46   AST 40 32   ALT 46 31       TSH   Date Value Ref Range Status   02/08/2024 0.92 0.30 - 4.20 uIU/mL Final   09/21/2023 1.15 0.30 - 4.20 uIU/mL Final   08/25/2021 1.08 0.40 - 4.00 mU/L Final       Lab Results   Component Value Date    A1C 6.4 02/08/2024    A1C 6.3 09/21/2023     ASSESSMENT/PLAN:  (G30.9,  F02.818) Alzheimer's dementia with behavioral disturbance (H)  (primary encounter diagnosis)  (F03.918) 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

## 2024-05-25 ENCOUNTER — HEALTH MAINTENANCE LETTER (OUTPATIENT)
Age: 74
End: 2024-05-25

## 2024-06-18 ENCOUNTER — PATIENT OUTREACH (OUTPATIENT)
Dept: CARE COORDINATION | Facility: CLINIC | Age: 74
End: 2024-06-18
Payer: COMMERCIAL

## 2024-07-05 DIAGNOSIS — K64.4 EXTERNAL HEMORRHOIDS: ICD-10-CM

## 2024-07-05 RX ORDER — HYDROCORTISONE 25 MG/G
CREAM TOPICAL
Qty: 30 G | Refills: 97 | Status: SHIPPED | OUTPATIENT
Start: 2024-07-05

## 2024-07-09 VITALS
BODY MASS INDEX: 22.13 KG/M2 | HEIGHT: 64 IN | SYSTOLIC BLOOD PRESSURE: 159 MMHG | WEIGHT: 129.6 LBS | DIASTOLIC BLOOD PRESSURE: 59 MMHG | TEMPERATURE: 97.6 F | HEART RATE: 60 BPM | OXYGEN SATURATION: 82 % | RESPIRATION RATE: 17 BRPM

## 2024-07-09 NOTE — PROGRESS NOTES
[facility]  [place] Medical Record Number:  [id number]  Place of Service where encounter took place:  [facility] [[id number]]  Chief Complaint   Patient presents with    Assisted Living Acute     Increased behaviors       HPI:    Marie Connolly  is a 73 year old (1950), who is being seen today for an episodic care visit.  HPI information obtained from: facility chart records, facility staff, patient report, and [facility] chart review. Today's concern is:    Seen today for follow up to ongoing and possible increase of behaviors.   Resident with history of physical aggressive behaviors towards other residents and staff on  unit including hitting, pushing. She exhibits territorial behavior with delusions of possessions and space on the unit. Olanzapine BID and daily prn (no use of prn since 1/2024).    Facility notes of 7/8/24:      Past Medical and Surgical History reviewed in Epic today.    MEDICATIONS:    Current Outpatient Medications   Medication Sig Dispense Refill    acetaminophen (TYLENOL) 325 MG tablet TAKE 2 TABLETS (650MG) BY MOUTH TWICE DAILY;AND MAY ALSO TAKE TWO TABLETS (650 MG) TWICE DAILY AS NEEDED 720 tablet 11    amLODIPine (NORVASC) 2.5 MG tablet TAKE 1 TABLET BY MOUTH ONCE DAILY 28 tablet 97    atorvastatin (LIPITOR) 40 MG tablet Take 2 tablets (80 mg) by mouth daily 30 tablet 11    diclofenac (VOLTAREN) 1 % topical gel APPLY 2 GRAMS TOPICALLY TO BOTH KNEES DAILY EACH MORNING (KEEP IN LOCKED CABINET) 100 g 11    DULoxetine HCl (DRIZALMA SPRINKLE) 30 MG CSDR Take 1 capsule (30 mg) by mouth daily 30 capsule 3    hydrocortisone, Perianal, (ANUSOL-HC) 2.5 % cream APPLY RECTALLY TWICE DAILY AS NEEDED 30 g 97    Multiple Vitamins-Minerals (ONE-A-DAY WOMENS) TABS Take 1 tablet by mouth daily 31 tablet 11    OLANZapine (ZYPREXA) 5 MG tablet TAKE ONE-HALF TABLET (2.5MG) BY MOUTH TWICE DAILY AND MAY HAVE ONCE DAILY as NEEDED 90 tablet 97     "polyethylene glycol (MIRALAX) 17 GM/Dose powder MIX 17GM OF POWDER IN 8OZ OF WATER UNTIL COMPLETELY DISSOLVED. DRINK SOLUTION BY MOUTH 3 TIMES WEEKLY. 510 g 11    SENEXON-S 8.6-50 MG tablet TAKE 1 TABLET BY MOUTH AT BEDTIME;& MAY TAKE 1 TABLET BY MOUTH TWICE DAILY AS NEEDED 270 tablet 97    Sodium Phosphates (ENEMA) 7-19 GM/118ML ENEM PLACE 1 ENEMA RECTALLY ONCE DAILY AS NEEDED FOR CONSTIPATION 133 mL 97    VITAMIN D3 50 MCG (2000 UT) tablet TAKE 1 TABLET BY MOUTH ONCE DAILY 90 tablet 3     REVIEW OF SYSTEMS:  Unobtainable secondary to cognitive impairment.     Objective:  BP (!) 159/59   Pulse 60   Temp 97.6  F (36.4  C)   Resp 17   Ht 1.626 m (5' 4\")   Wt 58.8 kg (129 lb 9.6 oz)   SpO2 (!) 82%   BMI 22.25 kg/m    Exam:  GENERAL APPEARANCE:  Alert, calm and pleasant, more flat  ENT:  Mouth and posterior oropharynx normal, moist mucous membranes, poor dentition  RESP:  respiratory effort and palpation of chest normal, lungs clear to auscultation   CV:  Palpation and auscultation of heart done , regular rate and rhythm, 3/6 murmur, rub, or gallop, trace edema  ABDOMEN:  bowel sounds, soft, nontender  M/S:   Gait and station w/o assistive device-limps at times (not as much), ROM with both knees  SKIN: limited but intact to visualized areas  NEURO:   Cranial nerves 2-12 are normal tested and grossly at patient's baseline  PSYCH:  insight and judgement impaired, memory impaired     Labs:   CBC RESULTS:   Recent Labs   Lab Test 02/08/24  1209 09/21/23  0810   WBC 5.9 4.6   RBC 4.30 4.37   HGB 13.7 14.1   HCT 41.9 44.1   MCV 97 101*   MCH 31.9 32.3   MCHC 32.7 32.0   RDW 13.2 12.4    257       Last Basic Metabolic Panel:  Recent Labs   Lab Test 02/08/24  1209 09/21/23  0810    145   POTASSIUM 3.9 3.5   CHLORIDE 107 108*   NERIS 10.0 9.8   CO2 23 25   BUN 9.3 10.9   CR 0.61 0.77   * 104*       Liver Function Studies -   Recent Labs   Lab Test 02/08/24  1209 09/21/23  0810   PROTTOTAL 6.8 7.3 "   ALBUMIN 4.3 4.2   BILITOTAL 0.5 0.4   ALKPHOS 53 46   AST 40 32   ALT 46 31       TSH   Date Value Ref Range Status   02/08/2024 0.92 0.30 - 4.20 uIU/mL Final   09/21/2023 1.15 0.30 - 4.20 uIU/mL Final   08/25/2021 1.08 0.40 - 4.00 mU/L Final       Lab Results   Component Value Date    A1C 6.4 02/08/2024    A1C 6.3 09/21/2023     Estimated Creatinine Clearance: 77.4 mL/min (based on SCr of 0.61 mg/dL).      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 Will hold on increase to zyprexa. Staff report there are some care staff that she does not like and contributes to issues. Has been urinating/defecating around unit  Plan:   -increase to duloxetine from 30 mg po daily to 30 mg po BID           Electronically signed by:  JACKI Luther CNP

## 2024-07-10 ENCOUNTER — ASSISTED LIVING VISIT (OUTPATIENT)
Dept: GERIATRICS | Facility: CLINIC | Age: 74
End: 2024-07-10
Payer: COMMERCIAL

## 2024-07-10 DIAGNOSIS — F33.8 OTHER RECURRENT DEPRESSIVE DISORDERS (H): ICD-10-CM

## 2024-07-10 PROCEDURE — 99349 HOME/RES VST EST MOD MDM 40: CPT | Performed by: NURSE PRACTITIONER

## 2024-07-10 NOTE — LETTER
Marie Connolly orders:     -increase to              DULoxetine HCl (DRIZALMA SPRINKLE) 30 MG CSDR Take 1 capsule (30 mg) by mouth 2 times daily               Electronically signed by:  JACKI Luther CNP

## 2024-07-10 NOTE — LETTER
7/10/2024      [facility]  [address]  [address]  [place] [address]        [facility]  [place] Medical Record Number:  [id number]  Place of Service where encounter took place:  [facility] [[id number]]  Chief Complaint   Patient presents with     Assisted Living Acute     Increased behaviors       HPI:    Marie Connolly  is a 73 year old (1950), who is being seen today for an episodic care visit.  HPI information obtained from: facility chart records, facility staff, patient report, and [facility] chart review. Today's concern is:    Seen today for follow up to ongoing and possible increase of behaviors.   Resident with history of physical aggressive behaviors towards other residents and staff on  unit including hitting, pushing. She exhibits territorial behavior with delusions of possessions and space on the unit. Olanzapine BID and daily prn (no use of prn since 1/2024).    Facility notes of 7/8/24:      Past Medical and Surgical History reviewed in Epic today.    MEDICATIONS:    Current Outpatient Medications   Medication Sig Dispense Refill     acetaminophen (TYLENOL) 325 MG tablet TAKE 2 TABLETS (650MG) BY MOUTH TWICE DAILY;AND MAY ALSO TAKE TWO TABLETS (650 MG) TWICE DAILY AS NEEDED 720 tablet 11     amLODIPine (NORVASC) 2.5 MG tablet TAKE 1 TABLET BY MOUTH ONCE DAILY 28 tablet 97     atorvastatin (LIPITOR) 40 MG tablet Take 2 tablets (80 mg) by mouth daily 30 tablet 11     diclofenac (VOLTAREN) 1 % topical gel APPLY 2 GRAMS TOPICALLY TO BOTH KNEES DAILY EACH MORNING (KEEP IN LOCKED CABINET) 100 g 11     DULoxetine HCl (DRIZALMA SPRINKLE) 30 MG CSDR Take 1 capsule (30 mg) by mouth daily 30 capsule 3     hydrocortisone, Perianal, (ANUSOL-HC) 2.5 % cream APPLY RECTALLY TWICE DAILY AS NEEDED 30 g 97     Multiple Vitamins-Minerals (ONE-A-DAY WOMENS) TABS Take 1 tablet by mouth daily 31 tablet 11     OLANZapine  "(ZYPREXA) 5 MG tablet TAKE ONE-HALF TABLET (2.5MG) BY MOUTH TWICE DAILY AND MAY HAVE ONCE DAILY as NEEDED 90 tablet 97     polyethylene glycol (MIRALAX) 17 GM/Dose powder MIX 17GM OF POWDER IN 8OZ OF WATER UNTIL COMPLETELY DISSOLVED. DRINK SOLUTION BY MOUTH 3 TIMES WEEKLY. 510 g 11     SENEXON-S 8.6-50 MG tablet TAKE 1 TABLET BY MOUTH AT BEDTIME;& MAY TAKE 1 TABLET BY MOUTH TWICE DAILY AS NEEDED 270 tablet 97     Sodium Phosphates (ENEMA) 7-19 GM/118ML ENEM PLACE 1 ENEMA RECTALLY ONCE DAILY AS NEEDED FOR CONSTIPATION 133 mL 97     VITAMIN D3 50 MCG (2000 UT) tablet TAKE 1 TABLET BY MOUTH ONCE DAILY 90 tablet 3     REVIEW OF SYSTEMS:  Unobtainable secondary to cognitive impairment.     Objective:  BP (!) 159/59   Pulse 60   Temp 97.6  F (36.4  C)   Resp 17   Ht 1.626 m (5' 4\")   Wt 58.8 kg (129 lb 9.6 oz)   SpO2 (!) 82%   BMI 22.25 kg/m    Exam:  GENERAL APPEARANCE:  Alert, calm and pleasant, more flat  ENT:  Mouth and posterior oropharynx normal, moist mucous membranes, poor dentition  RESP:  respiratory effort and palpation of chest normal, lungs clear to auscultation   CV:  Palpation and auscultation of heart done , regular rate and rhythm, 3/6 murmur, rub, or gallop, trace edema  ABDOMEN:  bowel sounds, soft, nontender  M/S:   Gait and station w/o assistive device-limps at times (not as much), ROM with both knees  SKIN: limited but intact to visualized areas  NEURO:   Cranial nerves 2-12 are normal tested and grossly at patient's baseline  PSYCH:  insight and judgement impaired, memory impaired     Labs:   CBC RESULTS:   Recent Labs   Lab Test 02/08/24  1209 09/21/23  0810   WBC 5.9 4.6   RBC 4.30 4.37   HGB 13.7 14.1   HCT 41.9 44.1   MCV 97 101*   MCH 31.9 32.3   MCHC 32.7 32.0   RDW 13.2 12.4    257       Last Basic Metabolic Panel:  Recent Labs   Lab Test 02/08/24  1209 09/21/23  0810    145   POTASSIUM 3.9 3.5   CHLORIDE 107 108*   NERIS 10.0 9.8   CO2 23 25   BUN 9.3 10.9   CR 0.61 0.77 "   * 104*       Liver Function Studies -   Recent Labs   Lab Test 02/08/24  1209 09/21/23  0810   PROTTOTAL 6.8 7.3   ALBUMIN 4.3 4.2   BILITOTAL 0.5 0.4   ALKPHOS 53 46   AST 40 32   ALT 46 31       TSH   Date Value Ref Range Status   02/08/2024 0.92 0.30 - 4.20 uIU/mL Final   09/21/2023 1.15 0.30 - 4.20 uIU/mL Final   08/25/2021 1.08 0.40 - 4.00 mU/L Final       Lab Results   Component Value Date    A1C 6.4 02/08/2024    A1C 6.3 09/21/2023     Estimated Creatinine Clearance: 77.4 mL/min (based on SCr of 0.61 mg/dL).      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 Will hold on increase to zyprexa. Staff report there are some care staff that she does not like and contributes to issues. Has been urinating/defecating around unit  Plan:   -increase to duloxetine from 30 mg po daily to 30 mg po BID           Electronically signed by:  JACKI Luther CNP             Sincerely,        JACKI Luther CNP

## 2024-07-12 RX ORDER — DULOXETINE 30 MG/1
1 CAPSULE, DELAYED RELEASE ORAL 2 TIMES DAILY
Qty: 30 CAPSULE | Refills: 3 | Status: SHIPPED | OUTPATIENT
Start: 2024-07-12 | End: 2024-10-02

## 2024-08-03 DIAGNOSIS — E78.00 PURE HYPERCHOLESTEROLEMIA: ICD-10-CM

## 2024-08-05 RX ORDER — ATORVASTATIN CALCIUM 80 MG/1
TABLET, FILM COATED ORAL
Qty: 90 TABLET | Refills: 97 | Status: SHIPPED | OUTPATIENT
Start: 2024-08-05

## 2024-09-01 DIAGNOSIS — K59.01 SLOW TRANSIT CONSTIPATION: ICD-10-CM

## 2024-09-02 RX ORDER — DOCUSATE SODIUM 50MG AND SENNOSIDES 8.6MG 8.6; 5 MG/1; MG/1
TABLET, FILM COATED ORAL
Qty: 270 TABLET | Refills: 97 | Status: SHIPPED | OUTPATIENT
Start: 2024-09-02

## 2024-09-10 ENCOUNTER — PATIENT OUTREACH (OUTPATIENT)
Dept: GERIATRIC MEDICINE | Facility: CLINIC | Age: 74
End: 2024-09-10
Payer: COMMERCIAL

## 2024-09-10 NOTE — LETTER
September 10, 2024    [facility]  [address]  [address]  [facility] [address]    Dear  Reza,    Welcome to [facility] s MSC+ health program. My name is DAPHNE Red. I am your MSC+ care coordinator. You are eligible for Care Coordination through [facility] MSC+ plan.    As your care coordinator, we ll:  Meet to go over your care coordination benefits  Talk about your physical and mental health care needs   Review your preventative care needs  Create a plan that meets your needs with the services you choose    What happens next?  I ll call you soon to introduce myself and tell you more about my role. We ll then plan time to go over your health and safety needs. Our goal is to keep you as healthy and independent as possible.    Soon, you will receive a new MSC+ member identification (ID) card from [facility]. When you receive it, please use this card along with your [place] [facility] card and Prescription Drug Coverage Program card. When you receive, it please use this card where you get your health services. If you have Medicare, you will need to show your Medicare card when you get health services.    The MSC+ care coordination program is voluntary and offered to you at no cost. If you wish to stop being in the care coordination program or have questions, call me at [phone]. If you reach my voicemail, leave a message and your phone number. TTY users, call the [place] Relay at 907 or [phone] (kgatud-gz-fknfro relay service).    Sincerely,      DAPHNE Red    E-mail: [email or web address]  Phone: [phone]      [employer]    [id number] accepted   [id number]       [id number] (07/2022)

## 2024-09-10 NOTE — PROGRESS NOTES
[facility] Care Coordination Contact    Member became effective with  [facility] on 9/1/2024 with [facility] MSC+.  Previous Health Plan: MA/Fee For Service  Previous Care System: [place] Transfer  Previous care coordinators name and number: Jamilah Burton  Waiver Type: RM  UTF received: Yes: Received and saved to member file  Mailed welcome letter and [facility] When to Contact Your Care Coordinator  Address/Phone discrepancy: N/A  [place] Choices and fully prepped for visit as [facility].    An Granda  Care Management Specialist  [facility]  [phone]

## 2024-09-10 NOTE — PROGRESS NOTES
Received CSSP, CPS, and PCA summary for transfer paperwork. LM for Soheila Roland ([phone]) who completed the CSSP requesting a copy of the universal transfer form, [place], [place] assessment summary, RS tool, and sig pages. Jamilah Burton who is listed as developing that CSP has a non-working phone number.    Kasey Aviles BREN  [facility]  [phone]  Fax: [phone]

## 2024-09-12 NOTE — PROGRESS NOTES
Received all transfer paperwork besides UTF and signature pages. Sent an inbox message to previous care coordinator and  in [facility] requesting a copy.    Kasey Aviles, [facility]  [phone]  Fax: [phone]

## 2024-09-16 NOTE — PROGRESS NOTES
LM for Soheila Watkins ([phone]) requesting a copy of the universal transfer form and signature pages from last assessment. Received all other transfer paperwork.     Kasey Aviles, [facility]  [phone]  Fax: [phone]

## 2024-09-20 ENCOUNTER — PATIENT OUTREACH (OUTPATIENT)
Dept: CARE COORDINATION | Facility: CLINIC | Age: 74
End: 2024-09-20
Payer: COMMERCIAL

## 2024-09-23 ENCOUNTER — PATIENT OUTREACH (OUTPATIENT)
Dept: GERIATRIC MEDICINE | Facility: CLINIC | Age: 74
End: 2024-09-23
Payer: COMMERCIAL

## 2024-09-23 NOTE — PROGRESS NOTES
[facility] Care Coordination Contact      [facility] Care System Change (Transfer)    Member is new enrollee to [facility] effective 09/01/2024 with [facility]+ health plan. Member transferred from [facility] system.    See progress note 9/10/24 re: transfer paperwork. Son declined new assessment.    Writer t/c to son, introduced self as member's new CC. Confirmed with son that the welcome letter with writer's name and contact information has been received.  Reviewed LTCC/Health Risk Assessment (HRA) and POC with member. No changes noted.  Transitional HRA completed. Care Plan Summary updated and reflects current services.  Required referral authorization information communicated to CMS: Yes    Writer reviewed the following with member:    ER visits: No  Hospitalizations: No  TCU stays: No  Significant health status changes: Son reported decline in last year.  Falls/Injuries: No  ADL/IADL changes: No  Changes in services: No  Notified Health  Bron[facility]cutt [facility] that CC is following for care coordination. Malou Hensley, [facility], who manages [facility] also notified.    Follow-Up Plan: Member informed of future contact, plan to f/u with member with at next regularly scheduled contact.  Contact information shared with member and family, encouraged member to call with any questions or concerns.    DAPHNE Red  [facility]  [phone]  Fax: [phone]

## 2024-09-23 NOTE — PROGRESS NOTES
Received an email on 9/18 from Jacquie Burton w/ Soheila attached asking Soheila to f/u on [employer] message. Received email from Soheila on 9/18 inquiring what signature pages KARINE was looking for and said she was unfamiliar w/ the UTF. Emailed Soheila a copy of a blank UTF and asked for assessment signatures 9/18. Followed up a second time on 9/20 via email requesting the UTF and signature page.    Per direction from CC supervisor, REBECCA and emailed Soheila's supervisor Hanna Goodson [phone], [email or web address]) requesting UTF and assessment signature pages along w/ explaining previous requests made.     Kasey Aviles, [facility]  [phone]  Fax: [phone]

## 2024-09-27 ENCOUNTER — PATIENT OUTREACH (OUTPATIENT)
Dept: GERIATRIC MEDICINE | Facility: CLINIC | Age: 74
End: 2024-09-27
Payer: COMMERCIAL

## 2024-09-27 NOTE — PROGRESS NOTES
[facility] Care Coordination Contact      [facility] Six-Month Telephone Assessment    6 month telephone assessment completed on 09/23/2024.    ER visits: No  Hospitalizations: No  TCU stays: No  Significant health status changes: None reported  Falls/Injuries: No  ADL/IADL changes: No  Changes in services: No    Caregiver Assessment follow up: Completed w/ son.    Goals: See Support Plan for goal progress documentation.      Will see member in 6 months for an annual health risk assessment.   Encouraged member to call CC with any questions or concerns in the meantime.     DAPHNE Red  [facility]  [phone]  Fax: [phone]

## 2024-09-27 NOTE — PROGRESS NOTES
[facility] Care Coordination Contact    Received task from care coordinator.  Completed following tasks: Submitted referrals/auths for AL and Updated services in Database  This is 9/1/2024 Transfer to [facility] 9/1/2024    An Granda  Care Management Specialist  [facility]  [phone]

## 2024-10-02 DIAGNOSIS — F33.8 OTHER RECURRENT DEPRESSIVE DISORDERS (H): ICD-10-CM

## 2024-10-02 RX ORDER — DULOXETIN HYDROCHLORIDE 30 MG/1
30 CAPSULE, DELAYED RELEASE ORAL 2 TIMES DAILY
Qty: 180 CAPSULE | Refills: 97 | Status: SHIPPED | OUTPATIENT
Start: 2024-10-02

## 2024-10-08 VITALS
OXYGEN SATURATION: 96 % | WEIGHT: 119.4 LBS | DIASTOLIC BLOOD PRESSURE: 72 MMHG | TEMPERATURE: 97.2 F | BODY MASS INDEX: 20.38 KG/M2 | RESPIRATION RATE: 21 BRPM | HEIGHT: 64 IN | HEART RATE: 66 BPM | SYSTOLIC BLOOD PRESSURE: 145 MMHG

## 2024-10-08 NOTE — PROGRESS NOTES
[facility]  [place] Medical Record Number:  [id number]  Place of Service where encounter took place:  [facility] [[id number]]  Chief Complaint   Patient presents with    RECHECK       HPI:    Marie Connolly  is a 74 year old (1950), who is being seen today for an episodic care visit.  HPI information obtained from: facility chart records, facility staff, patient report, and [facility] chart review. Today's concern is:    Seen today for following up to chronic disease management. Continues to urinate and defecate in inappropriate places with advancing dementia. No use of prn zyprexa but continues on scheduled. Flat affect at baseline. Resident with history of physical aggressive behaviors towards other residents and staff on [facility] unit including hitting, pushing. She exhibits territorial behavior with delusions of possessions and space on the unit.     Past Medical and Surgical History reviewed in Epic today.    MEDICATIONS:  Current Outpatient Medications   Medication Sig Dispense Refill    acetaminophen (TYLENOL) 325 MG tablet TAKE 2 TABLETS (650MG) BY MOUTH TWICE DAILY;AND MAY ALSO TAKE TWO TABLETS (650 MG) TWICE DAILY AS NEEDED 720 tablet 11    amLODIPine (NORVASC) 2.5 MG tablet TAKE 1 TABLET BY MOUTH ONCE DAILY 28 tablet 97    atorvastatin (LIPITOR) 80 MG tablet TAKE 1 TABLET BY MOUTH ONCE DAILY  90 tablet 97    diclofenac (VOLTAREN) 1 % topical gel APPLY 2 GRAMS TOPICALLY TO BOTH KNEES DAILY EACH MORNING (KEEP IN LOCKED CABINET) 100 g 11    DULoxetine (CYMBALTA) 30 MG capsule TAKE 1 CAPSULE BY MOUTH TWICE DAILY 180 capsule 97    hydrocortisone, Perianal, (ANUSOL-HC) 2.5 % cream APPLY RECTALLY TWICE DAILY AS NEEDED 30 g 97    Multiple Vitamins-Minerals (ONE-A-DAY WOMENS) TABS Take 1 tablet by mouth daily 31 tablet 11    OLANZapine (ZYPREXA) 5 MG tablet TAKE ONE-HALF TABLET (2.5MG) BY MOUTH TWICE DAILY AND MAY HAVE ONCE DAILY as NEEDED 90 tablet 97     "polyethylene glycol (MIRALAX) 17 GM/Dose powder MIX 17GM OF POWDER IN 8OZ OF WATER UNTIL COMPLETELY DISSOLVED. DRINK SOLUTION BY MOUTH 3 TIMES WEEKLY. 510 g 11    SENEXON-S 8.6-50 MG tablet TAKE 1 TABLET BY MOUTH AT BEDTIME;AND MAY TAKE ONE TABLET TWICE DAILY AS NEEDED 270 tablet 97    Sodium Phosphates (ENEMA) 7-19 GM/118ML ENEM PLACE 1 ENEMA RECTALLY ONCE DAILY AS NEEDED FOR CONSTIPATION 133 mL 97    VITAMIN D3 50 MCG (2000 UT) tablet TAKE 1 TABLET BY MOUTH ONCE DAILY 90 tablet 3     REVIEW OF SYSTEMS:  Unobtainable secondary to cognitive impairment.     Objective:  BP (!) 145/72   Pulse 66   Temp 97.2  F (36.2  C)   Resp 21   Ht 1.626 m (5' 4\")   Wt 54.2 kg (119 lb 6.4 oz)   SpO2 96%   BMI 20.49 kg/m    Exam:  GENERAL APPEARANCE:  Alert, calm and pleasant, more flat  ENT:  Mouth and posterior oropharynx normal, moist mucous membranes, poor dentition  RESP:  respiratory effort and palpation of chest normal, lungs clear to auscultation   CV:  Palpation and auscultation of heart done , regular rate and rhythm, 3/6 murmur, rub, or gallop, trace edema  ABDOMEN:  bowel sounds, soft, nontender  M/S:   Gait and station w/o assistive device-limps at times   SKIN: limited but intact to visualized areas  NEURO:   Cranial nerves 2-12 are normal tested and grossly at patient's baseline  PSYCH:  insight and judgement impaired, memory impaired     Labs:   CBC RESULTS:   Recent Labs   Lab Test 02/08/24  1209 09/21/23  0810   WBC 5.9 4.6   RBC 4.30 4.37   HGB 13.7 14.1   HCT 41.9 44.1   MCV 97 101*   MCH 31.9 32.3   MCHC 32.7 32.0   RDW 13.2 12.4    257       Last Basic Metabolic Panel:  Recent Labs   Lab Test 02/08/24 1209 09/21/23  0810    145   POTASSIUM 3.9 3.5   CHLORIDE 107 108*   NERIS 10.0 9.8   CO2 23 25   BUN 9.3 10.9   CR 0.61 0.77   * 104*       Liver Function Studies -   Recent Labs   Lab Test 02/08/24  1209 09/21/23  0810   PROTTOTAL 6.8 7.3   ALBUMIN 4.3 4.2   BILITOTAL 0.5 0.4   ALKPHOS " 53 46   AST 40 32   ALT 46 31       TSH   Date Value Ref Range Status   02/08/2024 0.92 0.30 - 4.20 uIU/mL Final   09/21/2023 1.15 0.30 - 4.20 uIU/mL Final   08/25/2021 1.08 0.40 - 4.00 mU/L Final       Lab Results   Component Value Date    A1C 6.4 02/08/2024    A1C 6.3 09/21/2023     ASSESSMENT/PLAN:  (G30.9,  F02.818) Alzheimer's dementia with behavioral disturbance (H)  (primary encounter diagnosis)  (F03.918) 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

## 2024-10-09 ENCOUNTER — ASSISTED LIVING VISIT (OUTPATIENT)
Dept: GERIATRICS | Facility: CLINIC | Age: 74
End: 2024-10-09
Payer: COMMERCIAL

## 2024-10-09 DIAGNOSIS — R46.89 AGGRESSIVE BEHAVIOR: ICD-10-CM

## 2024-10-09 DIAGNOSIS — F02.818 ALZHEIMER'S DEMENTIA WITH BEHAVIORAL DISTURBANCE (H): Primary | ICD-10-CM

## 2024-10-09 DIAGNOSIS — G30.9 ALZHEIMER'S DEMENTIA WITH BEHAVIORAL DISTURBANCE (H): Primary | ICD-10-CM

## 2024-10-09 DIAGNOSIS — F03.918 PSYCHOSIS IN ELDERLY WITH BEHAVIORAL DISTURBANCE (H): ICD-10-CM

## 2024-10-09 PROCEDURE — 99349 HOME/RES VST EST MOD MDM 40: CPT | Performed by: NURSE PRACTITIONER

## 2024-10-09 NOTE — LETTER
10/9/2024      [facility]  [address]  [address]  [place] [address]        [facility]  [place] [facility] Record Number:  [id number]  Place of Service where encounter took place:  [facility] [[id number]]  Chief Complaint   Patient presents with     RECHECK       HPI:    Marie Connolly  is a 74 year old (1950), who is being seen today for an episodic care visit.  HPI information obtained from: facility chart records, facility staff, patient report, and [facility] chart review. Today's concern is:    Seen today for following up to chronic disease management. Continues to urinate and defecate in inappropriate places with advancing dementia. No use of prn zyprexa but continues on scheduled. Flat affect at baseline. Resident with history of physical aggressive behaviors towards other residents and staff on  unit including hitting, pushing. She exhibits territorial behavior with delusions of possessions and space on the unit.     Past Medical and Surgical History reviewed in Epic today.    MEDICATIONS:  Current Outpatient Medications   Medication Sig Dispense Refill     acetaminophen (TYLENOL) 325 MG tablet TAKE 2 TABLETS (650MG) BY MOUTH TWICE DAILY;AND MAY ALSO TAKE TWO TABLETS (650 MG) TWICE DAILY AS NEEDED 720 tablet 11     amLODIPine (NORVASC) 2.5 MG tablet TAKE 1 TABLET BY MOUTH ONCE DAILY 28 tablet 97     atorvastatin (LIPITOR) 80 MG tablet TAKE 1 TABLET BY MOUTH ONCE DAILY  90 tablet 97     diclofenac (VOLTAREN) 1 % topical gel APPLY 2 GRAMS TOPICALLY TO BOTH KNEES DAILY EACH MORNING (KEEP IN LOCKED CABINET) 100 g 11     DULoxetine (CYMBALTA) 30 MG capsule TAKE 1 CAPSULE BY MOUTH TWICE DAILY 180 capsule 97     hydrocortisone, Perianal, (ANUSOL-HC) 2.5 % cream APPLY RECTALLY TWICE DAILY AS NEEDED 30 g 97     Multiple Vitamins-Minerals (ONE-A-DAY WOMENS) TABS Take 1 tablet by mouth daily 31 tablet 11     OLANZapine  "(ZYPREXA) 5 MG tablet TAKE ONE-HALF TABLET (2.5MG) BY MOUTH TWICE DAILY AND MAY HAVE ONCE DAILY as NEEDED 90 tablet 97     polyethylene glycol (MIRALAX) 17 GM/Dose powder MIX 17GM OF POWDER IN 8OZ OF WATER UNTIL COMPLETELY DISSOLVED. DRINK SOLUTION BY MOUTH 3 TIMES WEEKLY. 510 g 11     SENEXON-S 8.6-50 MG tablet TAKE 1 TABLET BY MOUTH AT BEDTIME;AND MAY TAKE ONE TABLET TWICE DAILY AS NEEDED 270 tablet 97     Sodium Phosphates (ENEMA) 7-19 GM/118ML ENEM PLACE 1 ENEMA RECTALLY ONCE DAILY AS NEEDED FOR CONSTIPATION 133 mL 97     VITAMIN D3 50 MCG (2000 UT) tablet TAKE 1 TABLET BY MOUTH ONCE DAILY 90 tablet 3     REVIEW OF SYSTEMS:  Unobtainable secondary to cognitive impairment.     Objective:  BP (!) 145/72   Pulse 66   Temp 97.2  F (36.2  C)   Resp 21   Ht 1.626 m (5' 4\")   Wt 54.2 kg (119 lb 6.4 oz)   SpO2 96%   BMI 20.49 kg/m    Exam:  GENERAL APPEARANCE:  Alert, calm and pleasant, more flat  ENT:  Mouth and posterior oropharynx normal, moist mucous membranes, poor dentition  RESP:  respiratory effort and palpation of chest normal, lungs clear to auscultation   CV:  Palpation and auscultation of heart done , regular rate and rhythm, 3/6 murmur, rub, or gallop, trace edema  ABDOMEN:  bowel sounds, soft, nontender  M/S:   Gait and station w/o assistive device-limps at times   SKIN: limited but intact to visualized areas  NEURO:   Cranial nerves 2-12 are normal tested and grossly at patient's baseline  PSYCH:  insight and judgement impaired, memory impaired     Labs:   CBC RESULTS:   Recent Labs   Lab Test 02/08/24  1209 09/21/23  0810   WBC 5.9 4.6   RBC 4.30 4.37   HGB 13.7 14.1   HCT 41.9 44.1   MCV 97 101*   MCH 31.9 32.3   MCHC 32.7 32.0   RDW 13.2 12.4    257       Last Basic Metabolic Panel:  Recent Labs   Lab Test 02/08/24  1209 09/21/23  0810    145   POTASSIUM 3.9 3.5   CHLORIDE 107 108*   NERIS 10.0 9.8   CO2 23 25   BUN 9.3 10.9   CR 0.61 0.77   * 104*       Liver Function Studies " -   Recent Labs   Lab Test 02/08/24  1209 09/21/23  0810   PROTTOTAL 6.8 7.3   ALBUMIN 4.3 4.2   BILITOTAL 0.5 0.4   ALKPHOS 53 46   AST 40 32   ALT 46 31       TSH   Date Value Ref Range Status   02/08/2024 0.92 0.30 - 4.20 uIU/mL Final   09/21/2023 1.15 0.30 - 4.20 uIU/mL Final   08/25/2021 1.08 0.40 - 4.00 mU/L Final       Lab Results   Component Value Date    A1C 6.4 02/08/2024    A1C 6.3 09/21/2023     ASSESSMENT/PLAN:  (G30.9,  F02.818) Alzheimer's dementia with behavioral disturbance (H)  (primary encounter diagnosis)  (F03.918) 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

## 2024-10-12 ENCOUNTER — HEALTH MAINTENANCE LETTER (OUTPATIENT)
Age: 74
End: 2024-10-12

## 2024-10-23 ENCOUNTER — PATIENT OUTREACH (OUTPATIENT)
Dept: GERIATRIC MEDICINE | Facility: CLINIC | Age: 74
End: 2024-10-23
Payer: COMMERCIAL

## 2024-10-23 NOTE — PROGRESS NOTES
[facility] Care Coordination Contact    CHW called member's son (Preston) to remind to schedule annual Dental, Eye exam, Annual Wellness and Mammogram.  CHW left a voice mail message and contact info.      ADDIS Singh  [facility]  [phone]

## 2024-10-30 ENCOUNTER — ASSISTED LIVING VISIT (OUTPATIENT)
Dept: GERIATRICS | Facility: CLINIC | Age: 74
End: 2024-10-30
Payer: COMMERCIAL

## 2024-10-30 VITALS
DIASTOLIC BLOOD PRESSURE: 88 MMHG | HEART RATE: 78 BPM | RESPIRATION RATE: 18 BRPM | TEMPERATURE: 98.3 F | HEIGHT: 64 IN | BODY MASS INDEX: 20.38 KG/M2 | WEIGHT: 119.4 LBS | OXYGEN SATURATION: 98 % | SYSTOLIC BLOOD PRESSURE: 162 MMHG

## 2024-10-30 DIAGNOSIS — G30.9 ALZHEIMER'S DEMENTIA WITH BEHAVIORAL DISTURBANCE (H): Primary | ICD-10-CM

## 2024-10-30 DIAGNOSIS — E78.2 MIXED HYPERLIPIDEMIA: ICD-10-CM

## 2024-10-30 DIAGNOSIS — I10 ESSENTIAL HYPERTENSION: ICD-10-CM

## 2024-10-30 DIAGNOSIS — F43.21 ADJUSTMENT DISORDER WITH DEPRESSED MOOD: ICD-10-CM

## 2024-10-30 DIAGNOSIS — F02.818 ALZHEIMER'S DEMENTIA WITH BEHAVIORAL DISTURBANCE (H): Primary | ICD-10-CM

## 2024-10-30 DIAGNOSIS — F03.918 PSYCHOSIS IN ELDERLY WITH BEHAVIORAL DISTURBANCE (H): ICD-10-CM

## 2024-10-30 DIAGNOSIS — K59.01 SLOW TRANSIT CONSTIPATION: ICD-10-CM

## 2024-10-30 PROCEDURE — 99349 HOME/RES VST EST MOD MDM 40: CPT | Performed by: INTERNAL MEDICINE

## 2024-10-30 NOTE — LETTER
10/30/2024      [facility]  [address]  [address]  [place] [address]        No notes on file      Sincerely,        Lisandra Ronquillo MD

## 2024-11-17 NOTE — PROGRESS NOTES
Marie Connolly is a 74 year old female seen October 30, 2024 at [facility] unit where she has resided for 3 years (admit 8/2021) seen to follow up dementia with behaviors.   Pt goes by Jarvis.   Pt is seen on the unit up to chair, mostly non verbal now.   Needs cuing for all activity     Staff notes pt will urinate or defecate just about anywhere, the potted plants, her laundry basket, etc.    She remains ambulatory and wanders about.  Can be vocal or physically aggressive to other residents if they irritate her, or territorial regarding her space /perceived possessions.        By chart review, pt has had cognitive decline over the past 12 years, followed by [facility]    First presented to her primary doctor in 2009 with self-reported memory loss, referred to Neurology in 2013, did Neuropsych testing in 2014    She failed a [employer] behind the wheel driving test in February 2020   Mini-cog in October 2020 was 0  She esd on donepezil and memantine for several years.       She is also treated for HTN, hypertriglyceridemia, OA and osteopenia.  She has not been hospitalized any time in the past 20 years  In 2023 pt was noted to have constipation requiring enemas and treatment for hemorrhoids     Past Medical History:   Diagnosis Date    Dementia in Alzheimer's disease (H) 11/30/2017    Essential hypertension 5/21/2021    Other disorders of pituitary gland (H) 12/27/2021    Other long term (current) drug therapy 12/27/2021    Other recurrent depressive disorders (H) 5/21/2021    Primary open angle glaucoma of both eyes, mild stage 12/27/2021    Pure hypercholesterolemia 12/27/2021    Right wrist pain 11/1/2021    Unspecified age-related cataract 12/27/2021    Vitamin D deficiency 12/27/2021       Past Surgical History:   Procedure Laterality Date    CATARACT REMOVAL  05/21/2021     SH:  Pt previously lived alone, townhouse in [place] with services and family support  Two sons and 2 daughters, son  "Preston is first contact   Retired   Native of [facility].   Non smoker    ROS: ambulatory without device  Weight in 2021 was 130 lbs      Wt Readings from Last 5 Encounters:   10/30/24 54.2 kg (119 lb 6.4 oz)   10/08/24 54.2 kg (119 lb 6.4 oz)   07/09/24 58.8 kg (129 lb 9.6 oz)   05/06/24 55.3 kg (122 lb)   02/20/24 56.3 kg (124 lb 3.2 oz)      EXAM:   NAD  BP (!) 162/88   Pulse 78   Temp 98.3  F (36.8  C)   Resp 18   Ht 1.626 m (5' 4\")   Wt 54.2 kg (119 lb 6.4 oz)   SpO2 98%   BMI 20.49 kg/m     Neck supple without adenopathy  Lungs clear bilaterally with good air movement  Heart RRR s1s2, I/VI DAVION  Abd soft, NT, no distention or guarding, +BS  Ext without edema  Neuro: very limited verbal skills    Ambulates without device, needs directional cuing   Psych: affect flat     Lab Results   Component Value Date     02/08/2024    POTASSIUM 3.9 02/08/2024    CHLORIDE 107 02/08/2024    CO2 23 02/08/2024    ANIONGAP 13 02/08/2024     (H) 02/08/2024    BUN 9.3 02/08/2024    CR 0.61 02/08/2024    GFRESTIMATED >90 02/08/2024    NERIS 10.0 02/08/2024     Lab Results   Component Value Date    AST 40 02/08/2024      ALBUMIN 4.3 02/08/2024      ALKPHOS 53 02/08/2024      Lab Results   Component Value Date    WBC 5.9 02/08/2024      HGB 13.7 02/08/2024      MCV 97 02/08/2024       02/08/2024      ECHO 9/17/2021    The visual ejection fraction is 60-65%. No regional wall motion abnormalities noted.  The right ventricle is normal in size and function. Right ventricular systolic  pressure could not be approximated due to inadequate tricuspid regurgitation.  There is moderate aortic sclerosis of the non-coronary cusp. The mean AoV  pressure gradient is 7.2 mmHg. No significant stenosis. There is mild (1+) aortic regurgitation.  The inferior vena cava was normal in size with preserved respiratory variability.  There is no pericardial effusion.      IMP/PLAN:    (I10) Essential hypertension   Comment:   BP " Readings from Last 3 Encounters:   10/30/24 (!) 162/88   10/08/24 (!) 145/72   07/09/24 (!) 159/59      Plan: amlodipine 2.5 mg/day >>> would increase to 5 mg/day with goal SBPs 140s or less, for cardioprotection given pt's relatively young age.       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)  Comment: early onset with gradual progression  Behaviors as above which are difficult to manage pharmacologically   Staff consistent about toileting pt regularly   Plan: [facility] unit for assist with med admin, meals, activity, secure unit  Remains on olanzapine 2.5 mg bid and PRN     (F43.21) Adjustment disorder with depressed mood  Comment: recent medication change  Plan: duloxetine 30 mg bid     (E78.2) Mixed hyperlipidemia  Comment: no documentation of CV event, but does have SVID on imaging   Lab Results   Component Value Date    CHOL 208 11/30/2023      HDL 50 11/30/2023       11/30/2023      TRIG 183 11/30/2023     Plan: atorvastatin 80 mg/day     (M85.80) Osteopenia, unspecified location  Comment: remains ambulatory   Plan: vit D 50 mcg/day, dietary calcium.     (M17.0) Primary osteoarthritis of both knees  Comment: less able to report pain   Plan: acetaminophen 650 mg bid and PRN     (K59.01) Slow transit constipation  Comment: ongoing   Plan: extensive bowel regimen to Senna, Miralax, and PRN enemas        Lisandra Ronquillo MD

## 2024-12-03 VITALS
BODY MASS INDEX: 20.28 KG/M2 | TEMPERATURE: 97.7 F | HEIGHT: 64 IN | HEART RATE: 72 BPM | WEIGHT: 118.8 LBS | SYSTOLIC BLOOD PRESSURE: 127 MMHG | RESPIRATION RATE: 16 BRPM | DIASTOLIC BLOOD PRESSURE: 81 MMHG | OXYGEN SATURATION: 96 %

## 2024-12-03 NOTE — PROGRESS NOTES
[facility]  [facility] Record Number:  [id number]  Place of Service where encounter took place:  [facility] [[id number]]  Chief Complaint   Patient presents with    RECHECK    Wellness Visit       HPI:    Marie Connolly  is a 74 year old (1950), who is being seen today for an episodic care visit.  HPI information obtained from: facility chart records, facility staff, patient report, and [facility] chart review. Today's concern is:    Seen today for following up to chronic disease management. Continues to urinate and defecate in inappropriate places with advancing dementia. No use of prn zyprexa but continues on scheduled. Flat affect at baseline. Staff keeping her clothes in locked laundry room. Resident with history of physical aggressive behaviors towards other residents and staff on  unit including hitting, pushing. She exhibits territorial behavior with delusions of possessions and space on the unit. Slow weight loss this past year. Not as interested in meals.      Past Medical and Surgical History reviewed in Epic today.    MEDICATIONS:    Current Outpatient Medications   Medication Sig Dispense Refill    acetaminophen (TYLENOL) 325 MG tablet TAKE 2 TABLETS (650MG) BY MOUTH TWICE DAILY;AND MAY ALSO TAKE TWO TABLETS (650 MG) TWICE DAILY AS NEEDED 720 tablet 11    amLODIPine (NORVASC) 2.5 MG tablet TAKE 1 TABLET BY MOUTH ONCE DAILY 28 tablet 97    atorvastatin (LIPITOR) 80 MG tablet TAKE 1 TABLET BY MOUTH ONCE DAILY  90 tablet 97    diclofenac (VOLTAREN) 1 % topical gel APPLY 2 GRAMS TOPICALLY TO BOTH KNEES DAILY EACH MORNING (KEEP IN LOCKED CABINET) 100 g 11    DULoxetine (CYMBALTA) 30 MG capsule TAKE 1 CAPSULE BY MOUTH TWICE DAILY 180 capsule 97    hydrocortisone, Perianal, (ANUSOL-HC) 2.5 % cream APPLY RECTALLY TWICE DAILY AS NEEDED 30 g 97    Multiple Vitamins-Minerals (ONE-A-DAY WOMENS) TABS Take 1 tablet by mouth daily 31 tablet 11     "OLANZapine (ZYPREXA) 5 MG tablet TAKE ONE-HALF TABLET (2.5MG) BY MOUTH TWICE DAILY AND MAY HAVE ONCE DAILY as NEEDED 90 tablet 97    polyethylene glycol (MIRALAX) 17 GM/Dose powder MIX 17GM OF POWDER IN 8OZ OF WATER UNTIL COMPLETELY DISSOLVED. DRINK SOLUTION BY MOUTH 3 TIMES WEEKLY. 510 g 97    SENEXON-S 8.6-50 MG tablet TAKE 1 TABLET BY MOUTH AT BEDTIME;AND MAY TAKE ONE TABLET TWICE DAILY AS NEEDED 270 tablet 97    Sodium Phosphates (ENEMA) 7-19 GM/118ML ENEM PLACE 1 ENEMA RECTALLY ONCE DAILY AS NEEDED FOR CONSTIPATION 133 mL 97    VITAMIN D3 50 MCG (2000 UT) tablet TAKE 1 TABLET BY MOUTH ONCE DAILY 90 tablet 3     REVIEW OF SYSTEMS:  Unobtainable secondary to cognitive impairment.     Objective:  /81   Pulse 72   Temp 97.7  F (36.5  C)   Resp 16   Ht 1.626 m (5' 4\")   Wt 53.9 kg (118 lb 12.8 oz)   SpO2 96%   BMI 20.39 kg/m    Exam:  GENERAL APPEARANCE:  Alert, calm and pleasant, more flat  ENT:  Mouth and posterior oropharynx normal, moist mucous membranes, poor dentition  RESP:  respiratory effort and palpation of chest normal, lungs clear to auscultation   CV:  Palpation and auscultation of heart done , regular rate and rhythm, 3/6 murmur, rub, or gallop, trace edema-no compression   ABDOMEN:  bowel sounds, soft, nontender  M/S:   Gait and station w/o assistive device-limps at times (not today)   SKIN: limited but intact to visualized areas  NEURO:   Cranial nerves 2-12 are normal tested and grossly at patient's baseline  PSYCH:  insight and judgement impaired, memory impaired        Labs:   CBC RESULTS:   Recent Labs   Lab Test 02/08/24  1209 09/21/23  0810   WBC 5.9 4.6   RBC 4.30 4.37   HGB 13.7 14.1   HCT 41.9 44.1   MCV 97 101*   MCH 31.9 32.3   MCHC 32.7 32.0   RDW 13.2 12.4    257       Last Basic Metabolic Panel:  Recent Labs   Lab Test 02/08/24  1209 09/21/23  0810    145   POTASSIUM 3.9 3.5   CHLORIDE 107 108*   NERIS 10.0 9.8   CO2 23 25   BUN 9.3 10.9   CR 0.61 0.77   * " 104*       Liver Function Studies -   Recent Labs   Lab Test 02/08/24  1209 09/21/23  0810   PROTTOTAL 6.8 7.3   ALBUMIN 4.3 4.2   BILITOTAL 0.5 0.4   ALKPHOS 53 46   AST 40 32   ALT 46 31       TSH   Date Value Ref Range Status   02/08/2024 0.92 0.30 - 4.20 uIU/mL Final   09/21/2023 1.15 0.30 - 4.20 uIU/mL Final   08/25/2021 1.08 0.40 - 4.00 mU/L Final       Lab Results   Component Value Date    A1C 6.4 02/08/2024    A1C 6.3 09/21/2023       SUBJECTIVE:   Marie is a 74 year old who presents for Preventive Visit.    Are you in the first 12 months of your Medicare coverage?  No    HPI  See above     Have you ever done Advance Care Planning? (For example, a Health Directive, POLST, or a discussion with a medical provider or your loved ones about your wishes): Yes, advance care planning is on file. Continues Full Code per family wishes       Fall risk  Fallen 2 or more times in the past year?: No    Cognitive Screening Not appropriate due to known dementia    Do you have sleep apnea, excessive snoring or daytime drowsiness? : no    Reviewed and updated as needed this visit by clinical staff   Tobacco  Allergies  Meds   Med Hx  Surg Hx  Fam Hx          Reviewed and updated as needed this visit by Provider                  Social History     Tobacco Use    Smoking status: Never    Smokeless tobacco: Never   Substance Use Topics    Alcohol use: Yes     Alcohol/week: 2.0 - 3.0 standard drinks of alcohol     Types: 2 - 3 Standard drinks or equivalent per week     Comment: special occasions              No data to display              Do you have a current opioid prescription? No  Do you use any other controlled substances or medications that are not prescribed by a provider? None    -------------------------------------    Current providers sharing in care for this patient include:   Patient Care Team:  Armaan Carr APRN CNP as PCP - General (Internal Medicine)  ([facility]), [facility] -  "Ruby Whiting as Other (see comments)  Armaan Carr, JACKI CNP as Assigned PCP  Kasey Aviles LSW as Lead Care Coordinator (Primary Care - CC)    The following health maintenance items are reviewed in Epic and correct as of today:  Health Maintenance   Topic Date Due    DEXA  Never done    DIABETIC FOOT EXAM  Never done    HEPATITIS C SCREENING  Never done    ZOSTER IMMUNIZATION (2 of 3) 12/10/2012    EYE EXAM  05/21/2022    MICROALBUMIN  08/25/2022    COLORECTAL CANCER SCREENING  04/28/2023    MEDICARE ANNUAL WELLNESS VISIT  12/21/2023    PHQ-2 (once per calendar year)  01/01/2024    MAMMO SCREENING  03/24/2024    A1C  05/08/2024    LIPID  11/30/2024    BMP  02/08/2025    FALL RISK ASSESSMENT  12/04/2025    DTAP/TDAP/TD IMMUNIZATION (2 - Td or Tdap) 04/22/2026    ADVANCE CARE PLANNING  12/22/2027    INFLUENZA VACCINE  Completed    Pneumococcal Vaccine: 65+ Years  Completed    RSV VACCINE  Completed    COVID-19 Vaccine  Completed    HPV IMMUNIZATION  Aged Out    MENINGITIS IMMUNIZATION  Aged Out    RSV MONOCLONAL ANTIBODY  Aged Out     Labs reviewed in EPIC  With family (son)   Review of Systems  See above     OBJECTIVE:   /81   Pulse 72   Temp 97.7  F (36.5  C)   Resp 16   Ht 1.626 m (5' 4\")   Wt 53.9 kg (118 lb 12.8 oz)   SpO2 96%   BMI 20.39 kg/m   Estimated body mass index is 20.39 kg/m  as calculated from the following:    Height as of this encounter: 1.626 m (5' 4\").    Weight as of this encounter: 53.9 kg (118 lb 12.8 oz).  Physical Exam  See above    Diagnostic Test Results:  Labs reviewed in Epic    ASSESSMENT / PLAN:       ICD-10-CM    1. Alzheimer's dementia with behavioral disturbance (H)  G30.9     F02.818       2. 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 Slow transit constipation  K59.01       4. Essential hypertension  I10       5. Adjustment disorder with depressed mood  F43.21       6. Aggressive behavior  R46.89       7. Primary " osteoarthritis of both knees  M17.0           Patient has been advised of split billing requirements and indicates understanding: N/A      COUNSELING:  Reviewed preventive health counseling, as reflected in patient instructions       Vision screening       Hearing screening       Fall risk prevention        She reports that she has never smoked. She has never used smokeless tobacco.      Appropriate preventive services were discussed with this patient, including applicable screening as appropriate for cardiovascular disease, diabetes, osteopenia/osteoporosis, and glaucoma.  As appropriate for age/gender, discussed screening for colorectal cancer, prostate cancer, breast cancer, and cervical cancer. Checklist reviewing preventive services available has been given to the patient.    Reviewed patients plan of care and provided an AVS. The Basic Care Plan (routine screening as documented in Health Maintenance) for Marie meets the Care Plan requirement. This Care Plan has been established and reviewed with the caregiver.          JACKI Luther CNP  [facility]    Identified Health Risks:  I have reviewed Opioid Use Disorder and Substance Use Disorder risk factors and made any needed referrals.     Electronically signed by:  JACKI Luther CNP

## 2024-12-04 ENCOUNTER — ASSISTED LIVING VISIT (OUTPATIENT)
Dept: GERIATRICS | Facility: CLINIC | Age: 74
End: 2024-12-04
Payer: COMMERCIAL

## 2024-12-04 DIAGNOSIS — M17.0 PRIMARY OSTEOARTHRITIS OF BOTH KNEES: ICD-10-CM

## 2024-12-04 DIAGNOSIS — F02.818 ALZHEIMER'S DEMENTIA WITH BEHAVIORAL DISTURBANCE (H): Primary | ICD-10-CM

## 2024-12-04 DIAGNOSIS — R46.89 AGGRESSIVE BEHAVIOR: ICD-10-CM

## 2024-12-04 DIAGNOSIS — F03.918 PSYCHOSIS IN ELDERLY WITH BEHAVIORAL DISTURBANCE (H): ICD-10-CM

## 2024-12-04 DIAGNOSIS — K59.01 SLOW TRANSIT CONSTIPATION: ICD-10-CM

## 2024-12-04 DIAGNOSIS — I10 ESSENTIAL HYPERTENSION: ICD-10-CM

## 2024-12-04 DIAGNOSIS — F43.21 ADJUSTMENT DISORDER WITH DEPRESSED MOOD: ICD-10-CM

## 2024-12-04 DIAGNOSIS — G30.9 ALZHEIMER'S DEMENTIA WITH BEHAVIORAL DISTURBANCE (H): Primary | ICD-10-CM

## 2024-12-04 NOTE — LETTER
12/4/2024      [facility]  [address]  [address]  [place] [address]        [facility]  [place] Record Number:  [id number]  Place of Service where encounter took place:  [facility] [[id number]]  Chief Complaint   Patient presents with     RECHECK     Wellness Visit       HPI:    Marie Connolly  is a 74 year old (1950), who is being seen today for an episodic care visit.  HPI information obtained from: facility chart records, facility staff, patient report, and [facility] chart review. Today's concern is:    Seen today for following up to chronic disease management. Continues to urinate and defecate in inappropriate places with advancing dementia. No use of prn zyprexa but continues on scheduled. Flat affect at baseline. Staff keeping her clothes in locked laundry room. Resident with history of physical aggressive behaviors towards other residents and staff on  unit including hitting, pushing. She exhibits territorial behavior with delusions of possessions and space on the unit. Slow weight loss this past year. Not as interested in meals.      Past Medical and Surgical History reviewed in Epic today.    MEDICATIONS:    Current Outpatient Medications   Medication Sig Dispense Refill     acetaminophen (TYLENOL) 325 MG tablet TAKE 2 TABLETS (650MG) BY MOUTH TWICE DAILY;AND MAY ALSO TAKE TWO TABLETS (650 MG) TWICE DAILY AS NEEDED 720 tablet 11     amLODIPine (NORVASC) 2.5 MG tablet TAKE 1 TABLET BY MOUTH ONCE DAILY 28 tablet 97     atorvastatin (LIPITOR) 80 MG tablet TAKE 1 TABLET BY MOUTH ONCE DAILY  90 tablet 97     diclofenac (VOLTAREN) 1 % topical gel APPLY 2 GRAMS TOPICALLY TO BOTH KNEES DAILY EACH MORNING (KEEP IN LOCKED CABINET) 100 g 11     DULoxetine (CYMBALTA) 30 MG capsule TAKE 1 CAPSULE BY MOUTH TWICE DAILY 180 capsule 97     hydrocortisone, Perianal, (ANUSOL-HC) 2.5 % cream APPLY RECTALLY TWICE DAILY AS  "NEEDED 30 g 97     Multiple Vitamins-Minerals (ONE-A-DAY WOMENS) TABS Take 1 tablet by mouth daily 31 tablet 11     OLANZapine (ZYPREXA) 5 MG tablet TAKE ONE-HALF TABLET (2.5MG) BY MOUTH TWICE DAILY AND MAY HAVE ONCE DAILY as NEEDED 90 tablet 97     polyethylene glycol (MIRALAX) 17 GM/Dose powder MIX 17GM OF POWDER IN 8OZ OF WATER UNTIL COMPLETELY DISSOLVED. DRINK SOLUTION BY MOUTH 3 TIMES WEEKLY. 510 g 97     SENEXON-S 8.6-50 MG tablet TAKE 1 TABLET BY MOUTH AT BEDTIME;AND MAY TAKE ONE TABLET TWICE DAILY AS NEEDED 270 tablet 97     Sodium Phosphates (ENEMA) 7-19 GM/118ML ENEM PLACE 1 ENEMA RECTALLY ONCE DAILY AS NEEDED FOR CONSTIPATION 133 mL 97     VITAMIN D3 50 MCG (2000 UT) tablet TAKE 1 TABLET BY MOUTH ONCE DAILY 90 tablet 3     REVIEW OF SYSTEMS:  Unobtainable secondary to cognitive impairment.     Objective:  /81   Pulse 72   Temp 97.7  F (36.5  C)   Resp 16   Ht 1.626 m (5' 4\")   Wt 53.9 kg (118 lb 12.8 oz)   SpO2 96%   BMI 20.39 kg/m    Exam:  GENERAL APPEARANCE:  Alert, calm and pleasant, more flat  ENT:  Mouth and posterior oropharynx normal, moist mucous membranes, poor dentition  RESP:  respiratory effort and palpation of chest normal, lungs clear to auscultation   CV:  Palpation and auscultation of heart done , regular rate and rhythm, 3/6 murmur, rub, or gallop, trace edema-no compression   ABDOMEN:  bowel sounds, soft, nontender  M/S:   Gait and station w/o assistive device-limps at times (not today)   SKIN: limited but intact to visualized areas  NEURO:   Cranial nerves 2-12 are normal tested and grossly at patient's baseline  PSYCH:  insight and judgement impaired, memory impaired        Labs:   CBC RESULTS:   Recent Labs   Lab Test 02/08/24  1209 09/21/23  0810   WBC 5.9 4.6   RBC 4.30 4.37   HGB 13.7 14.1   HCT 41.9 44.1   MCV 97 101*   MCH 31.9 32.3   MCHC 32.7 32.0   RDW 13.2 12.4    257       Last Basic Metabolic Panel:  Recent Labs   Lab Test 02/08/24  1209 09/21/23  0810 "    145   POTASSIUM 3.9 3.5   CHLORIDE 107 108*   NERIS 10.0 9.8   CO2 23 25   BUN 9.3 10.9   CR 0.61 0.77   * 104*       Liver Function Studies -   Recent Labs   Lab Test 02/08/24  1209 09/21/23  0810   PROTTOTAL 6.8 7.3   ALBUMIN 4.3 4.2   BILITOTAL 0.5 0.4   ALKPHOS 53 46   AST 40 32   ALT 46 31       TSH   Date Value Ref Range Status   02/08/2024 0.92 0.30 - 4.20 uIU/mL Final   09/21/2023 1.15 0.30 - 4.20 uIU/mL Final   08/25/2021 1.08 0.40 - 4.00 mU/L Final       Lab Results   Component Value Date    A1C 6.4 02/08/2024    A1C 6.3 09/21/2023       SUBJECTIVE:   Marie is a 74 year old who presents for Preventive Visit.    Are you in the first 12 months of your Medicare coverage?  No    HPI  See above     Have you ever done Advance Care Planning? (For example, a Health Directive, POLST, or a discussion with a medical provider or your loved ones about your wishes): Yes, advance care planning is on file. Continues Full Code per family wishes       Fall risk  Fallen 2 or more times in the past year?: No    Cognitive Screening Not appropriate due to known dementia    Do you have sleep apnea, excessive snoring or daytime drowsiness? : no    Reviewed and updated as needed this visit by clinical staff   Tobacco  Allergies  Meds   Med Hx  Surg Hx  Fam Hx          Reviewed and updated as needed this visit by Provider                  Social History     Tobacco Use     Smoking status: Never     Smokeless tobacco: Never   Substance Use Topics     Alcohol use: Yes     Alcohol/week: 2.0 - 3.0 standard drinks of alcohol     Types: 2 - 3 Standard drinks or equivalent per week     Comment: special occasions              No data to display              Do you have a current opioid prescription? No  Do you use any other controlled substances or medications that are not prescribed by a provider? None    -------------------------------------    Current providers sharing in care for this patient include:   Patient  "Care Team:  Armaan Carr APRN CNP as PCP - General (Internal Medicine)  ([facility]), [facility] Ruby Whiting as Other (see comments)  Armaan Carr APRN CNP as Assigned PCP  Kasey Aviles LSW as Lead Care Coordinator (Primary Care - CC)    The following health maintenance items are reviewed in Epic and correct as of today:  Health Maintenance   Topic Date Due     DEXA  Never done     DIABETIC FOOT EXAM  Never done     HEPATITIS C SCREENING  Never done     ZOSTER IMMUNIZATION (2 of 3) 12/10/2012     EYE EXAM  05/21/2022     MICROALBUMIN  08/25/2022     COLORECTAL CANCER SCREENING  04/28/2023     MEDICARE ANNUAL WELLNESS VISIT  12/21/2023     PHQ-2 (once per calendar year)  01/01/2024     MAMMO SCREENING  03/24/2024     A1C  05/08/2024     LIPID  11/30/2024     BMP  02/08/2025     FALL RISK ASSESSMENT  12/04/2025     DTAP/TDAP/TD IMMUNIZATION (2 - Td or Tdap) 04/22/2026     ADVANCE CARE PLANNING  12/22/2027     INFLUENZA VACCINE  Completed     Pneumococcal Vaccine: 65+ Years  Completed     RSV VACCINE  Completed     COVID-19 Vaccine  Completed     HPV IMMUNIZATION  Aged Out     MENINGITIS IMMUNIZATION  Aged Out     RSV MONOCLONAL ANTIBODY  Aged Out     Labs reviewed in EPIC  With family (son)   Review of Systems  See above     OBJECTIVE:   /81   Pulse 72   Temp 97.7  F (36.5  C)   Resp 16   Ht 1.626 m (5' 4\")   Wt 53.9 kg (118 lb 12.8 oz)   SpO2 96%   BMI 20.39 kg/m   Estimated body mass index is 20.39 kg/m  as calculated from the following:    Height as of this encounter: 1.626 m (5' 4\").    Weight as of this encounter: 53.9 kg (118 lb 12.8 oz).  Physical Exam  See above    Diagnostic Test Results:  Labs reviewed in Epic    ASSESSMENT / PLAN:       ICD-10-CM    1. Alzheimer's dementia with behavioral disturbance (H)  G30.9     F02.818       2. 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 Slow transit constipation  K59.01       4. " Essential hypertension  I10       5. Adjustment disorder with depressed mood  F43.21       6. Aggressive behavior  R46.89       7. Primary osteoarthritis of both knees  M17.0           Patient has been advised of split billing requirements and indicates understanding: N/A      COUNSELING:  Reviewed preventive health counseling, as reflected in patient instructions       Vision screening       Hearing screening       Fall risk prevention        She reports that she has never smoked. She has never used smokeless tobacco.      Appropriate preventive services were discussed with this patient, including applicable screening as appropriate for cardiovascular disease, diabetes, osteopenia/osteoporosis, and glaucoma.  As appropriate for age/gender, discussed screening for colorectal cancer, prostate cancer, breast cancer, and cervical cancer. Checklist reviewing preventive services available has been given to the patient.    Reviewed patients plan of care and provided an AVS. The Basic Care Plan (routine screening as documented in Health Maintenance) for Marie meets the Care Plan requirement. This Care Plan has been established and reviewed with the caregiver.          JACKI Luther CNP  [facility]    Identified Health Risks:  I have reviewed Opioid Use Disorder and Substance Use Disorder risk factors and made any needed referrals.     Electronically signed by:  JACKI Luther CNP       Sincerely,        JACKI Luther CNP

## 2024-12-08 DIAGNOSIS — F43.21 ADJUSTMENT DISORDER WITH DEPRESSED MOOD: Primary | ICD-10-CM

## 2024-12-09 RX ORDER — OLANZAPINE 2.5 MG/1
TABLET, FILM COATED ORAL
Qty: 30 TABLET | Refills: 97 | Status: SHIPPED | OUTPATIENT
Start: 2024-12-09

## 2025-01-14 ENCOUNTER — PATIENT OUTREACH (OUTPATIENT)
Dept: GERIATRIC MEDICINE | Facility: CLINIC | Age: 75
End: 2025-01-14
Payer: COMMERCIAL

## 2025-01-14 NOTE — PROGRESS NOTES
[facility] Care Coordination Contact    No longer active with [facility] community case management effective 09/30/2024.  Reason for community disenrollment: Change Insurance. [employer] inactive.     DAPHNE Red  [facility]  [phone]  Fax: [phone]

## 2025-01-25 ENCOUNTER — HEALTH MAINTENANCE LETTER (OUTPATIENT)
Age: 75
End: 2025-01-25

## 2025-03-04 VITALS
DIASTOLIC BLOOD PRESSURE: 90 MMHG | HEART RATE: 69 BPM | RESPIRATION RATE: 10 BRPM | WEIGHT: 120.6 LBS | SYSTOLIC BLOOD PRESSURE: 149 MMHG | BODY MASS INDEX: 20.59 KG/M2 | HEIGHT: 64 IN | TEMPERATURE: 97.5 F | OXYGEN SATURATION: 99 %

## 2025-03-04 NOTE — PROGRESS NOTES
[place] [facility]  [place] Medical Record Number:  [id number]  Place of Service where encounter took place:  [facility] [[id number]]  Chief Complaint   Patient presents with    RECHECK       HPI:    Marie Connolly  is a 74 year old (1950), who is being seen today for an episodic care visit.  HPI information obtained from: facility chart records, facility staff, patient report, and [facility] chart review. Today's concern is:    Seen today for follow up to chronic disease management. No concerns from staff. Continues to urinate around her apartment and does have urine odor today. Calm and pleasant, not able to give any hpi with dementia. Ambulates around the unit without assistive device. Not wearing socks and shoes. Minimal use of prn zyprexa but on scheduled. Tablet ordered for vitamin D as was not able to take capsule. Medications are crushed. VSS and weight mostly stable.    Past Medical and Surgical History reviewed in Epic today.    MEDICATIONS:    Current Outpatient Medications   Medication Sig Dispense Refill    acetaminophen (TYLENOL) 325 MG tablet TAKE 2 TABLETS (650MG) BY MOUTH TWICE DAILY;AND MAY ALSO TAKE TWO TABLETS (650 MG) TWICE DAILY AS NEEDED 720 tablet 11    amLODIPine (NORVASC) 2.5 MG tablet TAKE 1 TABLET BY MOUTH ONCE DAILY 28 tablet 97    atorvastatin (LIPITOR) 80 MG tablet TAKE 1 TABLET BY MOUTH ONCE DAILY  90 tablet 97    diclofenac (VOLTAREN) 1 % topical gel APPLY 2 GRAMS TOPICALLY TO BOTH KNEES DAILY EACH MORNING (KEEP IN LOCKED CABINET) 100 g 11    DULoxetine (CYMBALTA) 30 MG capsule TAKE 1 CAPSULE BY MOUTH TWICE DAILY 180 capsule 97    hydrocortisone, Perianal, (ANUSOL-HC) 2.5 % cream APPLY RECTALLY TWICE DAILY AS NEEDED 30 g 97    Multiple Vitamins-Minerals (ONE-A-DAY WOMENS) TABS Take 1 tablet by mouth daily 31 tablet 11    OLANZapine (ZYPREXA) 2.5 MG tablet TAKE 1 TABLET BY MOUTH ONCE DAILY AS NEEDED  30 tablet 97    OLANZapine (ZYPREXA)  "5 MG tablet TAKE ONE-HALF TABLET (2.5MG) BY MOUTH TWICE DAILY AND MAY HAVE ONCE DAILY as NEEDED 90 tablet 97    polyethylene glycol (MIRALAX) 17 GM/Dose powder MIX 17GM OF POWDER IN 8OZ OF WATER UNTIL COMPLETELY DISSOLVED. DRINK SOLUTION BY MOUTH 3 TIMES WEEKLY. 510 g 97    SENEXON-S 8.6-50 MG tablet TAKE 1 TABLET BY MOUTH AT BEDTIME;AND MAY TAKE ONE TABLET TWICE DAILY AS NEEDED 270 tablet 97    Sodium Phosphates (ENEMA) 7-19 GM/118ML ENEM PLACE 1 ENEMA RECTALLY ONCE DAILY AS NEEDED FOR CONSTIPATION 133 mL 97    VITAMIN D3 50 MCG (2000 UT) tablet TAKE 1 TABLET BY MOUTH ONCE DAILY 90 tablet 3     REVIEW OF SYSTEMS:  Unobtainable secondary to cognitive impairment.     Objective:  BP (!) 149/90   Pulse 69   Temp 97.5  F (36.4  C)   Resp 10   Ht 1.626 m (5' 4\")   Wt 54.7 kg (120 lb 9.6 oz)   SpO2 99%   BMI 20.70 kg/m    Exam:  GENERAL APPEARANCE:  Alert, calm and pleasant, more flat  ENT:  Mouth and posterior oropharynx normal, moist mucous membranes, poor dentition-dentures out  RESP:  respiratory effort and palpation of chest normal, lungs clear to auscultation   CV:  Palpation and auscultation of heart done , regular rate and rhythm, 3/6 murmur, rub, or gallop, trace edema-no compression   ABDOMEN:  bowel sounds, soft, nontender  M/S:   Gait and station w/o assistive device-limps at times (not today)   SKIN: limited but intact to visualized areas  NEURO:   Cranial nerves 2-12 are normal tested and grossly at patient's baseline  PSYCH:  insight and judgement impaired, memory impaired        Labs:   CBC RESULTS:   Recent Labs   Lab Test 02/08/24  1209 09/21/23  0810   WBC 5.9 4.6   RBC 4.30 4.37   HGB 13.7 14.1   HCT 41.9 44.1   MCV 97 101*   MCH 31.9 32.3   MCHC 32.7 32.0   RDW 13.2 12.4    257       Last Basic Metabolic Panel:  Recent Labs   Lab Test 02/08/24  1209 09/21/23  0810    145   POTASSIUM 3.9 3.5   CHLORIDE 107 108*   NERIS 10.0 9.8   CO2 23 25   BUN 9.3 10.9   CR 0.61 0.77   * 104* "       Liver Function Studies -   Recent Labs   Lab Test 02/08/24  1209 09/21/23  0810   PROTTOTAL 6.8 7.3   ALBUMIN 4.3 4.2   BILITOTAL 0.5 0.4   ALKPHOS 53 46   AST 40 32   ALT 46 31       TSH   Date Value Ref Range Status   02/08/2024 0.92 0.30 - 4.20 uIU/mL Final   09/21/2023 1.15 0.30 - 4.20 uIU/mL Final   08/25/2021 1.08 0.40 - 4.00 mU/L Final       Lab Results   Component Value Date    A1C 6.4 02/08/2024    A1C 6.3 09/21/2023     ASSESSMENT/PLAN:  (G30.9,  F02.80) Dementia in Alzheimer's disease (H)  (primary encounter diagnosis)  (I10) Essential hypertension  (K59.01) Slow transit constipation  (M17.0) Primary osteoarthritis of both knees  (E55.9) Vitamin D deficiency      Orders:  -tablets of D reordered versus capsules      Electronically signed by:  JACKI Luther CNP     See in conjunction with Pebbles Vasquez NP Student

## 2025-03-05 ENCOUNTER — ASSISTED LIVING VISIT (OUTPATIENT)
Dept: GERIATRICS | Facility: CLINIC | Age: 75
End: 2025-03-05
Payer: COMMERCIAL

## 2025-03-05 DIAGNOSIS — G30.9 DEMENTIA IN ALZHEIMER'S DISEASE (H): Primary | ICD-10-CM

## 2025-03-05 DIAGNOSIS — F02.80 DEMENTIA IN ALZHEIMER'S DISEASE (H): Primary | ICD-10-CM

## 2025-03-05 DIAGNOSIS — I10 ESSENTIAL HYPERTENSION: ICD-10-CM

## 2025-03-05 DIAGNOSIS — E55.9 VITAMIN D DEFICIENCY: ICD-10-CM

## 2025-03-05 DIAGNOSIS — M17.0 PRIMARY OSTEOARTHRITIS OF BOTH KNEES: ICD-10-CM

## 2025-03-05 DIAGNOSIS — K59.01 SLOW TRANSIT CONSTIPATION: ICD-10-CM

## 2025-03-05 PROCEDURE — 99349 HOME/RES VST EST MOD MDM 40: CPT | Performed by: NURSE PRACTITIONER

## 2025-03-05 RX ORDER — CHOLECALCIFEROL (VITAMIN D3) 50 MCG
1 TABLET ORAL DAILY
Qty: 90 TABLET | Refills: 3 | Status: SHIPPED | OUTPATIENT
Start: 2025-03-05

## 2025-03-05 NOTE — LETTER
Marie Connolly orders:     -discontinue capsule and begin    Vitamin D3 50 mcg (2000 units) tablet Take 1 tablet (50 mcg) by mouth daily.               Electronically signed by:  JACKI Luther CNP

## 2025-03-05 NOTE — LETTER
3/5/2025      [facility]  [address]  [address]  [place] [address]        [place] [facility]  [place] Medical Record Number:  [id number]  Place of Service where encounter took place:  [facility] [[id number]]  Chief Complaint   Patient presents with     RECHECK       HPI:    Marie Connolly  is a 74 year old (1950), who is being seen today for an episodic care visit.  HPI information obtained from: facility chart records, facility staff, patient report, and [facility] chart review. Today's concern is:    Seen today for follow up to chronic disease management. No concerns from staff. Continues to urinate around her apartment and does have urine odor today. Calm and pleasant, not able to give any hpi with dementia. Ambulates around the unit without assistive device. Not wearing socks and shoes. Minimal use of prn zyprexa but on scheduled. Tablet ordered for vitamin D as was not able to take capsule. Medications are crushed. VSS and weight mostly stable.    Past Medical and Surgical History reviewed in Epic today.    MEDICATIONS:    Current Outpatient Medications   Medication Sig Dispense Refill     acetaminophen (TYLENOL) 325 MG tablet TAKE 2 TABLETS (650MG) BY MOUTH TWICE DAILY;AND MAY ALSO TAKE TWO TABLETS (650 MG) TWICE DAILY AS NEEDED 720 tablet 11     amLODIPine (NORVASC) 2.5 MG tablet TAKE 1 TABLET BY MOUTH ONCE DAILY 28 tablet 97     atorvastatin (LIPITOR) 80 MG tablet TAKE 1 TABLET BY MOUTH ONCE DAILY  90 tablet 97     diclofenac (VOLTAREN) 1 % topical gel APPLY 2 GRAMS TOPICALLY TO BOTH KNEES DAILY EACH MORNING (KEEP IN LOCKED CABINET) 100 g 11     DULoxetine (CYMBALTA) 30 MG capsule TAKE 1 CAPSULE BY MOUTH TWICE DAILY 180 capsule 97     hydrocortisone, Perianal, (ANUSOL-HC) 2.5 % cream APPLY RECTALLY TWICE DAILY AS NEEDED 30 g 97     Multiple Vitamins-Minerals (ONE-A-DAY WOMENS) TABS Take 1 tablet by mouth daily 31 tablet  "11     OLANZapine (ZYPREXA) 2.5 MG tablet TAKE 1 TABLET BY MOUTH ONCE DAILY AS NEEDED  30 tablet 97     OLANZapine (ZYPREXA) 5 MG tablet TAKE ONE-HALF TABLET (2.5MG) BY MOUTH TWICE DAILY AND MAY HAVE ONCE DAILY as NEEDED 90 tablet 97     polyethylene glycol (MIRALAX) 17 GM/Dose powder MIX 17GM OF POWDER IN 8OZ OF WATER UNTIL COMPLETELY DISSOLVED. DRINK SOLUTION BY MOUTH 3 TIMES WEEKLY. 510 g 97     SENEXON-S 8.6-50 MG tablet TAKE 1 TABLET BY MOUTH AT BEDTIME;AND MAY TAKE ONE TABLET TWICE DAILY AS NEEDED 270 tablet 97     Sodium Phosphates (ENEMA) 7-19 GM/118ML ENEM PLACE 1 ENEMA RECTALLY ONCE DAILY AS NEEDED FOR CONSTIPATION 133 mL 97     VITAMIN D3 50 MCG (2000 UT) tablet TAKE 1 TABLET BY MOUTH ONCE DAILY 90 tablet 3     REVIEW OF SYSTEMS:  Unobtainable secondary to cognitive impairment.     Objective:  BP (!) 149/90   Pulse 69   Temp 97.5  F (36.4  C)   Resp 10   Ht 1.626 m (5' 4\")   Wt 54.7 kg (120 lb 9.6 oz)   SpO2 99%   BMI 20.70 kg/m    Exam:  GENERAL APPEARANCE:  Alert, calm and pleasant, more flat  ENT:  Mouth and posterior oropharynx normal, moist mucous membranes, poor dentition-dentures out  RESP:  respiratory effort and palpation of chest normal, lungs clear to auscultation   CV:  Palpation and auscultation of heart done , regular rate and rhythm, 3/6 murmur, rub, or gallop, trace edema-no compression   ABDOMEN:  bowel sounds, soft, nontender  M/S:   Gait and station w/o assistive device-limps at times (not today)   SKIN: limited but intact to visualized areas  NEURO:   Cranial nerves 2-12 are normal tested and grossly at patient's baseline  PSYCH:  insight and judgement impaired, memory impaired        Labs:   CBC RESULTS:   Recent Labs   Lab Test 02/08/24  1209 09/21/23  0810   WBC 5.9 4.6   RBC 4.30 4.37   HGB 13.7 14.1   HCT 41.9 44.1   MCV 97 101*   MCH 31.9 32.3   MCHC 32.7 32.0   RDW 13.2 12.4    257       Last Basic Metabolic Panel:  Recent Labs   Lab Test 02/08/24  1209 " 09/21/23  0810    145   POTASSIUM 3.9 3.5   CHLORIDE 107 108*   NERIS 10.0 9.8   CO2 23 25   BUN 9.3 10.9   CR 0.61 0.77   * 104*       Liver Function Studies -   Recent Labs   Lab Test 02/08/24  1209 09/21/23  0810   PROTTOTAL 6.8 7.3   ALBUMIN 4.3 4.2   BILITOTAL 0.5 0.4   ALKPHOS 53 46   AST 40 32   ALT 46 31       TSH   Date Value Ref Range Status   02/08/2024 0.92 0.30 - 4.20 uIU/mL Final   09/21/2023 1.15 0.30 - 4.20 uIU/mL Final   08/25/2021 1.08 0.40 - 4.00 mU/L Final       Lab Results   Component Value Date    A1C 6.4 02/08/2024    A1C 6.3 09/21/2023     ASSESSMENT/PLAN:  (G30.9,  F02.80) Dementia in Alzheimer's disease (H)  (primary encounter diagnosis)  (I10) Essential hypertension  (K59.01) Slow transit constipation  (M17.0) Primary osteoarthritis of both knees  (E55.9) Vitamin D deficiency      Orders:  -tablets of D reordered versus capsules      Electronically signed by:  JACKI Luther CNP     See in conjunction with Pebbles Vasquez NP Student            Sincerely,        JACKI Luther CNP    Electronically signed

## 2025-03-10 NOTE — TELEPHONE ENCOUNTER
Patient is appearing on Dr. Prema Ramachandran's Panel Management Quality report / Health Maintenance list here at [facility].     Pt appears to be a resident at [facility]. Laura dementia    PCP listed as Armaan Carr CNP on 2-27-22 and has had visits with pt.    Dr. Ramachandran had 1 office visit with pt 8-.     It looks like [facility] set Dr. Ramachandran as ASSIGNED PCP on 02/27/2022.    I see mammo ordered on 02/23/2022 by Armaan.    Colonoscopy / Colono cancer screening  is showing as due.    Armaan will have a better sense of how to proceed with this health maintenance item.  Maybe just FIT test?     I am not contacting the pt but routing this note to Armaan. (I hope that is ok)     Dori MADDOX MA @ [facility].               Patient Quality Outreach      Summary:    Patient has the following on her problem list/HM:     Patient is due/failing the following:   Colon Cancer Screening -  FIT, Colonoscopy and Cologuard  Breast Cancer Screening - Mammogram    Type of outreach:    NONE with patient --sending this note to provider at [facility]     Questions for provider review:    None                                                                                                                                     Dori MADDOX MA       Chart routed to Provider at [facility] .           Rajendra He Jr

## 2025-05-03 ENCOUNTER — HEALTH MAINTENANCE LETTER (OUTPATIENT)
Age: 75
End: 2025-05-03

## 2025-06-18 ENCOUNTER — APPOINTMENT (OUTPATIENT)
Dept: CT IMAGING | Facility: CLINIC | Age: 75
End: 2025-06-18
Attending: EMERGENCY MEDICINE
Payer: COMMERCIAL

## 2025-06-18 ENCOUNTER — HOSPITAL ENCOUNTER (EMERGENCY)
Facility: CLINIC | Age: 75
Discharge: HOME | End: 2025-06-18
Attending: EMERGENCY MEDICINE
Payer: COMMERCIAL

## 2025-06-18 VITALS
SYSTOLIC BLOOD PRESSURE: 150 MMHG | HEART RATE: 78 BPM | RESPIRATION RATE: 21 BRPM | DIASTOLIC BLOOD PRESSURE: 84 MMHG | TEMPERATURE: 97.2 F | OXYGEN SATURATION: 97 %
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
## 2025-06-18 DIAGNOSIS — F02.80 ALZHEIMER'S DISEASE (H): ICD-10-CM

## 2025-06-18 DIAGNOSIS — G30.9 ALZHEIMER'S DISEASE (H): ICD-10-CM

## 2025-06-18 DIAGNOSIS — R10.31 RLQ ABDOMINAL PAIN: ICD-10-CM

## 2025-06-18 DIAGNOSIS — S30.0XXA HEMATOMA OF RIGHT BUTTOCK: ICD-10-CM

## 2025-06-18 LAB
ALBUMIN SERPL BCG-MCNC: 4.1 G/DL (ref 3.5–5.2)
ALBUMIN UR-MCNC: 20 MG/DL
ALP SERPL-CCNC: 56 U/L (ref 40–150)
ALT SERPL W P-5'-P-CCNC: 36 U/L (ref 0–50)
ANION GAP SERPL CALCULATED.3IONS-SCNC: 11 MMOL/L (ref 7–15)
APPEARANCE UR: CLEAR
AST SERPL W P-5'-P-CCNC: 37 U/L (ref 0–45)
BASE EXCESS BLDV CALC-SCNC: 1 MMOL/L (ref -3–3)
BASOPHILS # BLD AUTO: 0 10E3/UL (ref 0–0.2)
BASOPHILS NFR BLD AUTO: 0 %
BILIRUB SERPL-MCNC: 0.7 MG/DL
BILIRUB UR QL STRIP: NEGATIVE
BUN SERPL-MCNC: 12 MG/DL (ref 8–23)
CALCIUM SERPL-MCNC: 9.6 MG/DL (ref 8.8–10.4)
CHLORIDE SERPL-SCNC: 103 MMOL/L (ref 98–107)
CK SERPL-CCNC: 384 U/L (ref 26–192)
COLOR UR AUTO: YELLOW
CREAT BLD-MCNC: 0.7 MG/DL (ref 0.5–1)
CREAT SERPL-MCNC: 0.69 MG/DL (ref 0.51–0.95)
EGFRCR SERPLBLD CKD-EPI 2021: >60 ML/MIN/1.73M2
EGFRCR SERPLBLD CKD-EPI 2021: >90 ML/MIN/1.73M2
EOSINOPHIL # BLD AUTO: 0 10E3/UL (ref 0–0.7)
EOSINOPHIL NFR BLD AUTO: 0 %
ERYTHROCYTE [DISTWIDTH] IN BLOOD BY AUTOMATED COUNT: 13.1 % (ref 10–15)
GLUCOSE SERPL-MCNC: 123 MG/DL (ref 70–99)
GLUCOSE UR STRIP-MCNC: NEGATIVE MG/DL
HCO3 BLDV-SCNC: 27 MMOL/L (ref 21–28)
HCO3 SERPL-SCNC: 26 MMOL/L (ref 22–29)
HCT VFR BLD AUTO: 41 % (ref 35–47)
HGB BLD-MCNC: 13.4 G/DL (ref 11.7–15.7)
HGB UR QL STRIP: NEGATIVE
HOLD SPECIMEN: NORMAL
HOLD SPECIMEN: NORMAL
IMM GRANULOCYTES # BLD: 0.1 10E3/UL
IMM GRANULOCYTES NFR BLD: 1 %
KETONES UR STRIP-MCNC: ABNORMAL MG/DL
LACTATE BLD-SCNC: 1.3 MMOL/L (ref 0.7–2)
LEUKOCYTE ESTERASE UR QL STRIP: NEGATIVE
LIPASE SERPL-CCNC: 157 U/L (ref 13–60)
LYMPHOCYTES # BLD AUTO: 1.6 10E3/UL (ref 0.8–5.3)
LYMPHOCYTES NFR BLD AUTO: 16 %
MCH RBC QN AUTO: 31.8 PG (ref 26.5–33)
MCHC RBC AUTO-ENTMCNC: 32.7 G/DL (ref 31.5–36.5)
MCV RBC AUTO: 97 FL (ref 78–100)
MONOCYTES # BLD AUTO: 0.5 10E3/UL (ref 0–1.3)
MONOCYTES NFR BLD AUTO: 5 %
MUCOUS THREADS #/AREA URNS LPF: PRESENT /LPF
NEUTROPHILS # BLD AUTO: 7.5 10E3/UL (ref 1.6–8.3)
NEUTROPHILS NFR BLD AUTO: 77 %
NITRATE UR QL: NEGATIVE
NRBC # BLD AUTO: 0 10E3/UL
NRBC BLD AUTO-RTO: 0 /100
PCO2 BLDV: 48 MM HG (ref 40–50)
PH BLDV: 7.36 [PH] (ref 7.32–7.43)
PH UR STRIP: 5.5 [PH] (ref 5–7)
PLATELET # BLD AUTO: 235 10E3/UL (ref 150–450)
PO2 BLDV: 28 MM HG (ref 25–47)
POTASSIUM SERPL-SCNC: 3.9 MMOL/L (ref 3.4–5.3)
PROCALCITONIN SERPL IA-MCNC: 0.08 NG/ML
PROT SERPL-MCNC: 7.2 G/DL (ref 6.4–8.3)
RBC # BLD AUTO: 4.22 10E6/UL (ref 3.8–5.2)
RBC URINE: 7 /HPF
SAO2 % BLDV: 50 % (ref 70–75)
SODIUM SERPL-SCNC: 140 MMOL/L (ref 135–145)
SP GR UR STRIP: 1.03 (ref 1–1.03)
SQUAMOUS EPITHELIAL: 2 /HPF
UROBILINOGEN UR STRIP-MCNC: 2 MG/DL
WBC # BLD AUTO: 9.7 10E3/UL (ref 4–11)
WBC URINE: 1 /HPF

## 2025-06-18 PROCEDURE — 84145 PROCALCITONIN (PCT): CPT | Performed by: EMERGENCY MEDICINE

## 2025-06-18 PROCEDURE — 36415 COLL VENOUS BLD VENIPUNCTURE: CPT | Performed by: EMERGENCY MEDICINE

## 2025-06-18 PROCEDURE — 99285 EMERGENCY DEPT VISIT HI MDM: CPT | Mod: 25

## 2025-06-18 PROCEDURE — 83690 ASSAY OF LIPASE: CPT | Performed by: EMERGENCY MEDICINE

## 2025-06-18 PROCEDURE — 81003 URINALYSIS AUTO W/O SCOPE: CPT | Performed by: EMERGENCY MEDICINE

## 2025-06-18 PROCEDURE — 82565 ASSAY OF CREATININE: CPT

## 2025-06-18 PROCEDURE — 250N000009 HC RX 250: Performed by: EMERGENCY MEDICINE

## 2025-06-18 PROCEDURE — 250N000011 HC RX IP 250 OP 636: Performed by: EMERGENCY MEDICINE

## 2025-06-18 PROCEDURE — 84155 ASSAY OF PROTEIN SERUM: CPT | Performed by: EMERGENCY MEDICINE

## 2025-06-18 PROCEDURE — 83605 ASSAY OF LACTIC ACID: CPT

## 2025-06-18 PROCEDURE — 258N000003 HC RX IP 258 OP 636: Performed by: EMERGENCY MEDICINE

## 2025-06-18 PROCEDURE — 85004 AUTOMATED DIFF WBC COUNT: CPT | Performed by: EMERGENCY MEDICINE

## 2025-06-18 PROCEDURE — 96361 HYDRATE IV INFUSION ADD-ON: CPT

## 2025-06-18 PROCEDURE — 87040 BLOOD CULTURE FOR BACTERIA: CPT | Performed by: EMERGENCY MEDICINE

## 2025-06-18 PROCEDURE — 96360 HYDRATION IV INFUSION INIT: CPT | Mod: 59

## 2025-06-18 PROCEDURE — 71260 CT THORAX DX C+: CPT

## 2025-06-18 PROCEDURE — 82550 ASSAY OF CK (CPK): CPT | Performed by: EMERGENCY MEDICINE

## 2025-06-18 RX ORDER — IOPAMIDOL 755 MG/ML
60 INJECTION, SOLUTION INTRAVASCULAR ONCE
Status: COMPLETED | OUTPATIENT
Start: 2025-06-18 | End: 2025-06-18

## 2025-06-18 RX ORDER — FENTANYL CITRATE 50 UG/ML
50 INJECTION, SOLUTION INTRAMUSCULAR; INTRAVENOUS
Status: DISCONTINUED | OUTPATIENT
Start: 2025-06-18 | End: 2025-06-18 | Stop reason: HOSPADM

## 2025-06-18 RX ORDER — ONDANSETRON 2 MG/ML
4 INJECTION INTRAMUSCULAR; INTRAVENOUS ONCE
Status: DISCONTINUED | OUTPATIENT
Start: 2025-06-18 | End: 2025-06-18 | Stop reason: HOSPADM

## 2025-06-18 RX ADMIN — SODIUM CHLORIDE 60 ML: 9 INJECTION, SOLUTION INTRAVENOUS at 13:33

## 2025-06-18 RX ADMIN — IOPAMIDOL 60 ML: 755 INJECTION, SOLUTION INTRAVENOUS at 13:33

## 2025-06-18 RX ADMIN — SODIUM CHLORIDE 1000 ML: 0.9 INJECTION, SOLUTION INTRAVENOUS at 12:37

## 2025-06-18 ASSESSMENT — ACTIVITIES OF DAILY LIVING (ADL)
ADLS_ACUITY_SCORE: 41

## 2025-06-18 ASSESSMENT — COLUMBIA-SUICIDE SEVERITY RATING SCALE - C-SSRS: IS THE PATIENT NOT ABLE TO COMPLETE C-SSRS: UNABLE TO VERBALIZE

## 2025-06-18 NOTE — ED TRIAGE NOTES
Patient presents with family from memory care. Family states patient has been hunching over today and grimacing while guarding stomach. Right lower quadrant tenderness, hx of Alzheimer.

## 2025-06-18 NOTE — ED PROVIDER NOTES
Emergency Department Note      History of Present Illness     Chief Complaint   Abdominal Pain      BIBIANA Connolly is a 74 year old female with a history of dementia, alzheimer's, hypertension, and hypercholesterolemia who presents to the ED for evaluation of abdominal pain. History is limited due to the condition of the patient. The patient's son-in-law reports that the patient had finished eating lunch at her nursing home when they noticed she was hunched over and began guarding her stomach. He says that the facility noted the patient had lower right quadrant pain with normal vitals, but that she was having difficulty standing or ambulating. The son in law noted that even with assistance the patient was struggling to ambulate, and that he is unsure if she has actually consumed any food today. He says that his brother, the patient's son, is the patient's medical decision maker. The patient endorses pain in her abdomen.     Independent Historian   Son in law as detailed above.    Review of External Notes   None    Past Medical History     Medical History and Problem List   Dementia  Alzheimer's  Hypertension  Pituitary gland disorder  Long term drug therapy  Depressive disorder  Glaucoma, bilateral  Hypercholesterolemia  Cataract  Vitamin D deficiency    Medications   Norvasc  Lipitor  Voltaren  Cymbalta  Zyprexa  Miralax  Enema    Surgical History   Cataract removal    Colonoscopy    Physical Exam     Patient Vitals for the past 24 hrs:   BP Temp Temp src Pulse Resp SpO2   25 1500 (!) 150/84 -- -- 78 21 97 %   25 1300 131/80 -- -- 80 17 --   25 1230 127/74 -- -- 81 -- 97 %   25 1213 (!) 166/102 97.2  F (36.2  C) Temporal 104 18 97 %     Physical Exam  General: Demented, appears well-developed and well-nourished. Cooperative.     In mild distress  HEENT:  Head:  Atraumatic  Ears:  External ears are normal  Mouth/Throat:  Oropharynx is without erythema or exudate and mucous  membranes are dry.   Eyes:   Conjunctivae normal and EOM are normal. No scleral icterus.  CV:  Tachycardic rate, regular rhythm, normal heart sounds and radial pulses are 2+ and symmetric.    Resp:  Breath sounds are clear bilaterally    Non-labored, no retractions or accessory muscle use  GI:  Abdomen is soft, no distension, RLQ tenderness with guarding. No rebound.  No CVA tenderness bilaterally  MS:  Normal range of motion. No edema.    Back atraumatic.    No midline cervical, thoracic, or lumbar tenderness  Possible hematoma to right buttock, no overlying redness or cellulitis.  Skin:  Warm and dry.  No rash or lesions noted.  Mild yellowing/old bruise of the lateral right hip.   Neuro:   Demented. Normal strength.  GCS: 14  Psych: Pleasant and interactive.     Diagnostics     Lab Results   Labs Ordered and Resulted from Time of ED Arrival to Time of ED Departure   COMPREHENSIVE METABOLIC PANEL - Abnormal       Result Value    Sodium 140      Potassium 3.9      Carbon Dioxide (CO2) 26      Anion Gap 11      Urea Nitrogen 12.0      Creatinine 0.69      GFR Estimate >90      Calcium 9.6      Chloride 103      Glucose 123 (*)     Alkaline Phosphatase 56      AST 37      ALT 36      Protein Total 7.2      Albumin 4.1      Bilirubin Total 0.7     LIPASE - Abnormal    Lipase 157 (*)    ROUTINE UA WITH MICROSCOPIC REFLEX TO CULTURE - Abnormal    Color Urine Yellow      Appearance Urine Clear      Glucose Urine Negative      Bilirubin Urine Negative      Ketones Urine Trace (*)     Specific Gravity Urine 1.030      Blood Urine Negative      pH Urine 5.5      Protein Albumin Urine 20 (*)     Urobilinogen Urine 2.0 (*)     Nitrite Urine Negative      Leukocyte Esterase Urine Negative      Mucus Urine Present (*)     RBC Urine 7 (*)     WBC Urine 1      Squamous Epithelials Urine 2 (*)    ISTAT GASES LACTATE VENOUS POCT - Abnormal    Lactic Acid POCT 1.3      Bicarbonate Venous POCT 27      O2 Sat, Venous POCT 50 (*)      pCO2 Venous POCT 48      pH Venous POCT 7.36      pO2 Venous POCT 28      Base Excess/Deficit (+/-) POCT 1.0     CK TOTAL - Abnormal     (*)    PROCALCITONIN - Normal    Procalcitonin 0.08     ISTAT CREATININE POCT - Normal    Creatinine POCT 0.7      GFR, ESTIMATED POCT >60     CBC WITH PLATELETS AND DIFFERENTIAL    WBC Count 9.7      RBC Count 4.22      Hemoglobin 13.4      Hematocrit 41.0      MCV 97      MCH 31.8      MCHC 32.7      RDW 13.1      Platelet Count 235      % Neutrophils 77      % Lymphocytes 16      % Monocytes 5      % Eosinophils 0      % Basophils 0      % Immature Granulocytes 1      NRBCs per 100 WBC 0      Absolute Neutrophils 7.5      Absolute Lymphocytes 1.6      Absolute Monocytes 0.5      Absolute Eosinophils 0.0      Absolute Basophils 0.0      Absolute Immature Granulocytes 0.1      Absolute NRBCs 0.0     BLOOD CULTURE   BLOOD CULTURE       Imaging   CT Chest/Abdomen/Pelvis w Contrast   Final Result   IMPRESSION:   1.  Inflammatory changes of the right buttock and gluteus christina muscle. Trace intramuscular fluid in the gluteus christina, without a discrete drainable collection. Differential includes cellulitis/myositis, pressure sore or contusion.      2.  No acute CT findings in the thoracic or abdominopelvic cavities.      3.  Mild aortic valvular calcification, which can be seen with aortic stenosis.              Independent Interpretation   None    ED Course      Medications Administered   Medications   sodium chloride 0.9% BOLUS 1,000 mL (0 mLs Intravenous Stopped 6/18/25 1553)   iopamidol (ISOVUE-370) solution 60 mL (60 mLs Intravenous $Given 6/18/25 1333)   sodium chloride 0.9 % bag for CT scan flush (60 mLs Intravenous $Given 6/18/25 1333)       Procedures   Procedures     Discussion of Management   None    ED Course   ED Course as of 06/18/25 2025 Wed Jun 18, 2025   1220 I obtained history and examined the patient as noted above.        Additional  Documentation  None    Medical Decision Making / Diagnosis     CMS Diagnoses: None    MIPS   None               [facility]   Marie Connolly is a 74 year old female with a history of Alzheimer's dementia who presents due to concerns of guarding and pain in the right lower quadrant of her abdomen.  There was reports of a fall approximately 2 days ago but no obvious bruising on clinical exam.  There is evidence of a old bruise to the right lateral thigh but this appears to be old from several days of not weeks ago given its yellow appearance.  Patient had some mild reproducible tenderness in the right lower quadrant on examination but thankfully no sinister intra-abdominal findings within the thorax or abdomen pelvis.  There was inflammatory changes of the right buttock/gluteus christina muscle which I suspect is likely related to contusion.  I see no overlying cellulitis to the area.  This would fit with the report of a fall in the last 2 days.  Patient does have good range of motion of the right hip and so lower concern for deep space contusion or hematoma that would prevent the patient from ambulating safely.  Thankfully laboratory work grossly unremarkable with no leukocytosis, renal failure, electrolyte abnormality, or signs of infection.  Lactate normal at 1.3.  Urinalysis unremarkable for signs of infection.  CK faintly elevated but could be related to recent fall and hematoma.  No concern for rhabdomyolysis at this time.  Patient with no active vomiting hide normal lipase.  Patient does not have any epigastric pain and no signs of pancreatitis on CT imaging.  Unclear unifying etiology for the patient's abdominal discomfort today but could have been transient in nature following lunch today.  Patient was able to ambulate well here in the emergency department and is safe for return back to her memory care facility today.  After all questions answered and return precautions understood, discharged home.  Plan for follow-up  with primary care in the next 1 week for recheck.    Disposition   The patient was discharged.     Diagnosis     ICD-10-CM    1. Hematoma of right buttock  S30.0XXA       2. Fall, initial encounter  W19.XXXA       3. Alzheimer's disease (H)  G30.9     F02.80       4. RLQ abdominal pain  R10.31            Discharge Medications   Discharge Medication List as of 6/18/2025  3:55 PM            Scribe Disclosure:  I, Marina Gilmore, am serving as a scribe at 12:18 PM on 6/18/2025 to document services personally performed by Juan Davidson MD based on my observations and the provider's statements to me.        Juan Davidson MD  06/18/25 2025

## 2025-06-19 LAB
BACTERIA SPEC CULT: NORMAL
BACTERIA SPEC CULT: NORMAL

## 2025-06-23 LAB
BACTERIA SPEC CULT: NO GROWTH
BACTERIA SPEC CULT: NO GROWTH

## 2025-07-02 ENCOUNTER — ASSISTED LIVING VISIT (OUTPATIENT)
Dept: GERIATRICS | Facility: CLINIC | Age: 75
End: 2025-07-02
Payer: COMMERCIAL

## 2025-07-02 VITALS
RESPIRATION RATE: 16 BRPM | SYSTOLIC BLOOD PRESSURE: 116 MMHG | HEIGHT: 64 IN | BODY MASS INDEX: 19.36 KG/M2 | OXYGEN SATURATION: 97 % | TEMPERATURE: 97.6 F | HEART RATE: 83 BPM | WEIGHT: 113.4 LBS | DIASTOLIC BLOOD PRESSURE: 73 MMHG

## 2025-07-02 DIAGNOSIS — M17.0 PRIMARY OSTEOARTHRITIS OF BOTH KNEES: ICD-10-CM

## 2025-07-02 DIAGNOSIS — R29.6 FALLS FREQUENTLY: ICD-10-CM

## 2025-07-02 DIAGNOSIS — E55.9 VITAMIN D DEFICIENCY: ICD-10-CM

## 2025-07-02 DIAGNOSIS — G30.9 DEMENTIA IN ALZHEIMER'S DISEASE (H): Primary | ICD-10-CM

## 2025-07-02 DIAGNOSIS — K59.01 SLOW TRANSIT CONSTIPATION: ICD-10-CM

## 2025-07-02 DIAGNOSIS — I10 ESSENTIAL HYPERTENSION: ICD-10-CM

## 2025-07-02 DIAGNOSIS — R53.81 PHYSICAL DECONDITIONING: ICD-10-CM

## 2025-07-02 DIAGNOSIS — F02.80 DEMENTIA IN ALZHEIMER'S DISEASE (H): Primary | ICD-10-CM

## 2025-07-02 PROCEDURE — 99350 HOME/RES VST EST HIGH MDM 60: CPT | Performed by: NURSE PRACTITIONER

## 2025-07-02 NOTE — PROGRESS NOTES
"[facility] GERIATRICS    Chief Complaint   Patient presents with    Nursing Home Acute     Routine     HPI:  Marie Connolly is a 74 year old  (1950), who is being seen today for an episodic care visit at: [facility] [[id number]].     Today's concern is: Patient resting in general commons area today, happy and cooperative with exam.. Evaluated per family request. Marie had a recent ED visit on 6/18/25 for abdominal pain and recent falls. Etiology of pain unclear but patient discharge back to her AL memory care unit and has been stable since. Denies CP, SOB, lightheadedness, abdominal pain or N/V.     Writer called and talked with son Preston regarding goals of care and hospice. Marie remains full code but son stated he would like it changed to DNI per his mother's wishes. Uncertain if patient would qualify for hospice and family feels they may want hospitalization for future illnesses, etc.      Will follow up next week. Patient would benefit from therapy given recent falls.     Vital signs reviewed by me today  BP Readings from Last 3 Encounters:   07/02/25 116/73   06/18/25 (!) 150/84   03/04/25 (!) 149/90     Pulse Readings from Last 4 Encounters:   07/02/25 83   06/18/25 78   03/04/25 69   12/03/24 72     Wt Readings from Last 4 Encounters:   07/02/25 51.4 kg (113 lb 6.4 oz)   03/04/25 54.7 kg (120 lb 9.6 oz)   12/03/24 53.9 kg (118 lb 12.8 oz)   10/30/24 54.2 kg (119 lb 6.4 oz)         Allergies, and PMH/PSH reviewed in EPIC today.  REVIEW OF SYSTEMS:  Unobtainable secondary to cognitive impairment.     Objective:   /73   Pulse 83   Temp 97.6  F (36.4  C)   Resp 16   Ht 1.626 m (5' 4\")   Wt 51.4 kg (113 lb 6.4 oz)   SpO2 97%   BMI 19.47 kg/m    A & O x 1,  NAD. Lungs CTA, non labored. RRR, S1/S2 w/o murmur,gallop or rub.  no edema.  Abdomen soft, nontender, +BT'S. No focal neurological deficits.        Recent labs in [facility] reviewed by me today.   Lab " Results   Component Value Date    WBC 9.7 06/18/2025     Lab Results   Component Value Date    RBC 4.22 06/18/2025     Lab Results   Component Value Date    HGB 13.4 06/18/2025     Lab Results   Component Value Date    HCT 41.0 06/18/2025     Lab Results   Component Value Date    MCV 97 06/18/2025     Lab Results   Component Value Date    MCH 31.8 06/18/2025     Lab Results   Component Value Date    MCHC 32.7 06/18/2025     Lab Results   Component Value Date    RDW 13.1 06/18/2025     Lab Results   Component Value Date     06/18/2025     Last Comprehensive Metabolic Panel:  Lab Results   Component Value Date     06/18/2025    POTASSIUM 3.9 06/18/2025    CHLORIDE 103 06/18/2025    CO2 26 06/18/2025    ANIONGAP 11 06/18/2025     (H) 06/18/2025    BUN 12.0 06/18/2025    CR 0.7 06/18/2025    GFRESTIMATED >60 06/18/2025    NERIS 9.6 06/18/2025       TSH   Date Value Ref Range Status   02/08/2024 0.92 0.30 - 4.20 uIU/mL Final   08/25/2021 1.08 0.40 - 4.00 mU/L Final         Assessment/Plan:  (G30.9,  F02.80) Dementia in Alzheimer's disease (H)  (primary encounter diagnosis)  Chronic, progressive.  Patient recently evaluated in ED for abdominal pain following a fall.  Mobility changes over the past 6 months.  Family considering hospice but hoping for a physical therapy consult given frequency of falls.  Continue memory care support medication ministration, meals, safety.  Expect further decline with progression of disease including weight loss.    (I10) Essential hypertension  Chronic, stable.  BP Readings from Last 3 Encounters:   07/02/25 116/73   06/18/25 (!) 150/84   03/04/25 (!) 149/90   Variable Bps with some hypertension noted. Will follow up in 2 weeks.  Continue with plan of care no changes at this time, adjustment as needed  -Goal BP <150/90    (K59.01) Slow transit constipation  Chronic, stable.   -monitor     (M17.0) Primary osteoarthritis of both knees  Chronic, no indications of acute pain.    -acetaminophen scheduled and PRN  -diclofenac gel daily     (E55.9) Vitamin D deficiency  Taking supplement.   Vitamin D level yearly and PRN    (R29.6) Falls frequently  (R53.81) Physical deconditioning  Likely 2/2 progression of dementia.   -Family considering hospice.   -Physical therapy consult 2/2 frequent falls.     MED REC REQUIRED=  Post Medication Reconciliation Status: patient was not discharged from an inpatient facility or TCU      Orders:  Physical therapy consult     76 minutes spent on the date of the encounter doing chart review, history and exam, documentation and further activities as noted above.         Electronically signed by:Ira Card NP      Documentation of Face to Face and Certification for Home Health Services    I certify that patient: Marie Connolly is under my care and that I, or a nurse practitioner or physician's assistant working with me, had a face-to-face encounter that meets the physician face-to-face encounter requirements with this patient on: 3.    This encounter with the patient was in whole, or in part, for the following medical condition, which is the primary reason for home health care: frequent falls .    I certify that, based on my findings, the following services are medically necessary home health services: Physical Therapy.    My clinical findings support the need for the above services because: Physical Therapy Services are needed to assess and treat the following functional impairments: frequent falls, unstable gait.    Further, I certify that my clinical findings support that this patient is homebound (i.e. absences from home require considerable and taxing effort and are for medical reasons or [employer] services or infrequently or of short duration when for other reasons) because: Requires assistance of another person or specialized equipment to access medical services because patient: Requires supervision of another for safe transfer...    Based on the  above findings. I certify that this patient is confined to the home and needs intermittent skilled nursing care, physical therapy and/or speech therapy.  The patient is under my care, and I have initiated the establishment of the plan of care.  This patient will be followed by a physician who will periodically review the plan of care.  Physician/Provider to provide follow up care: Ira Card    Attending hospital physician (the Medicare certified [facility] provider): Ira Card NP  Physician Signature: See electronic signature associated with these discharge orders.  Date: 7/2/2025

## 2025-07-02 NOTE — LETTER
" 7/2/2025      Marie Connolly  [facility]  [address]  [address]  [place] [address]        [facility] GERIATRICS    Chief Complaint   Patient presents with     Nursing Home Acute     Routine     HPI:  Marie Connolly is a 74 year old  (1950), who is being seen today for an episodic care visit at: [facility] [[id number]].     Today's concern is: Patient resting in general commons area today, happy and cooperative with exam.. Evaluated per family request. Marie had a recent ED visit on 6/18/25 for abdominal pain and recent falls. Etiology of pain unclear but patient discharge back to her AL memory care unit and has been stable since. Denies CP, SOB, lightheadedness, abdominal pain or N/V.     Writer called and talked with magdiel Johnston regarding goals of care and hospice. Marie remains full code but son stated he would like it changed to DNI per his mother's wishes. Uncertain if patient would qualify for hospice and family feels they may want hospitalization for future illnesses, etc.      Will follow up next week. Patient would benefit from therapy given recent falls.     Vital signs reviewed by me today  BP Readings from Last 3 Encounters:   07/02/25 116/73   06/18/25 (!) 150/84   03/04/25 (!) 149/90     Pulse Readings from Last 4 Encounters:   07/02/25 83   06/18/25 78   03/04/25 69   12/03/24 72     Wt Readings from Last 4 Encounters:   07/02/25 51.4 kg (113 lb 6.4 oz)   03/04/25 54.7 kg (120 lb 9.6 oz)   12/03/24 53.9 kg (118 lb 12.8 oz)   10/30/24 54.2 kg (119 lb 6.4 oz)         Allergies, and PMH/PSH reviewed in EPIC today.  REVIEW OF SYSTEMS:  Unobtainable secondary to cognitive impairment.     Objective:   /73   Pulse 83   Temp 97.6  F (36.4  C)   Resp 16   Ht 1.626 m (5' 4\")   Wt 51.4 kg (113 lb 6.4 oz)   SpO2 97%   BMI 19.47 kg/m    A & O x 1,  NAD. Lungs CTA, non labored. RRR, S1/S2 w/o murmur,gallop or rub.  no edema.  Abdomen " soft, nontender, +BT'S. No focal neurological deficits.        Recent labs in [facility] reviewed by me today.   Lab Results   Component Value Date    WBC 9.7 06/18/2025     Lab Results   Component Value Date    RBC 4.22 06/18/2025     Lab Results   Component Value Date    HGB 13.4 06/18/2025     Lab Results   Component Value Date    HCT 41.0 06/18/2025     Lab Results   Component Value Date    MCV 97 06/18/2025     Lab Results   Component Value Date    MCH 31.8 06/18/2025     Lab Results   Component Value Date    MCHC 32.7 06/18/2025     Lab Results   Component Value Date    RDW 13.1 06/18/2025     Lab Results   Component Value Date     06/18/2025     Last Comprehensive Metabolic Panel:  Lab Results   Component Value Date     06/18/2025    POTASSIUM 3.9 06/18/2025    CHLORIDE 103 06/18/2025    CO2 26 06/18/2025    ANIONGAP 11 06/18/2025     (H) 06/18/2025    BUN 12.0 06/18/2025    CR 0.7 06/18/2025    GFRESTIMATED >60 06/18/2025    NERIS 9.6 06/18/2025       TSH   Date Value Ref Range Status   02/08/2024 0.92 0.30 - 4.20 uIU/mL Final   08/25/2021 1.08 0.40 - 4.00 mU/L Final         Assessment/Plan:  (G30.9,  F02.80) Dementia in Alzheimer's disease (H)  (primary encounter diagnosis)  Chronic, progressive.  Patient recently evaluated in ED for abdominal pain following a fall.  Mobility changes over the past 6 months.  Family considering hospice but hoping for a physical therapy consult given frequency of falls.  Continue memory care support medication ministration, meals, safety.  Expect further decline with progression of disease including weight loss.    (I10) Essential hypertension  Chronic, stable.  BP Readings from Last 3 Encounters:   07/02/25 116/73   06/18/25 (!) 150/84   03/04/25 (!) 149/90   Variable Bps with some hypertension noted. Will follow up in 2 weeks.  Continue with plan of care no changes at this time, adjustment as needed  -Goal BP <150/90    (K59.01) Slow transit  constipation  Chronic, stable.   -monitor     (M17.0) Primary osteoarthritis of both knees  Chronic, no indications of acute pain.   -acetaminophen scheduled and PRN  -diclofenac gel daily     (E55.9) Vitamin D deficiency  Taking supplement.   Vitamin D level yearly and PRN    (R29.6) Falls frequently  (R53.81) Physical deconditioning  Likely 2/2 progression of dementia.   -Family considering hospice.   -Physical therapy consult 2/2 frequent falls.     MED REC REQUIRED=  Post Medication Reconciliation Status: patient was not discharged from an inpatient facility or TCU      Orders:  Physical therapy consult     76 minutes spent on the date of the encounter doing chart review, history and exam, documentation and further activities as noted above.         Electronically signed by:Ira Card NP      Documentation of Face to Face and Certification for Home Health Services    I certify that patient: Marie Connolly is under my care and that I, or a nurse practitioner or physician's assistant working with me, had a face-to-face encounter that meets the physician face-to-face encounter requirements with this patient on: 3.    This encounter with the patient was in whole, or in part, for the following medical condition, which is the primary reason for home health care: frequent falls .    I certify that, based on my findings, the following services are medically necessary home health services: Physical Therapy.    My clinical findings support the need for the above services because: Physical Therapy Services are needed to assess and treat the following functional impairments: frequent falls, unstable gait.    Further, I certify that my clinical findings support that this patient is homebound (i.e. absences from home require considerable and taxing effort and are for medical reasons or [employer] services or infrequently or of short duration when for other reasons) because: Requires assistance of another person or  specialized equipment to access medical services because patient: Requires supervision of another for safe transfer...    Based on the above findings. I certify that this patient is confined to the home and needs intermittent skilled nursing care, physical therapy and/or speech therapy.  The patient is under my care, and I have initiated the establishment of the plan of care.  This patient will be followed by a physician who will periodically review the plan of care.  Physician/Provider to provide follow up care: Ira Card    Attending hospital physician (the Medicare certified [place] provider): Ira Card NP  Physician Signature: See electronic signature associated with these discharge orders.  Date: 7/2/2025        Orders for Marie Connolly:    Physical therapy consult 2/2 frequent falls.     Electronically signed  .me      Sincerely,        Ira Card NP    Electronically signed

## 2025-07-03 NOTE — PROGRESS NOTES
Orders for Marie Connolly:    Physical therapy consult 2/2 frequent falls.     Electronically signed  .me

## 2025-08-16 ENCOUNTER — HEALTH MAINTENANCE LETTER (OUTPATIENT)
Age: 75
End: 2025-08-16